# Patient Record
Sex: FEMALE | Race: NATIVE HAWAIIAN OR OTHER PACIFIC ISLANDER | NOT HISPANIC OR LATINO | Employment: FULL TIME | URBAN - METROPOLITAN AREA
[De-identification: names, ages, dates, MRNs, and addresses within clinical notes are randomized per-mention and may not be internally consistent; named-entity substitution may affect disease eponyms.]

---

## 2017-09-28 ENCOUNTER — OFFICE VISIT (OUTPATIENT)
Dept: MEDICAL GROUP | Facility: MEDICAL CENTER | Age: 37
End: 2017-09-28
Payer: COMMERCIAL

## 2017-09-28 VITALS
DIASTOLIC BLOOD PRESSURE: 60 MMHG | HEART RATE: 70 BPM | RESPIRATION RATE: 16 BRPM | BODY MASS INDEX: 23.43 KG/M2 | WEIGHT: 149.3 LBS | OXYGEN SATURATION: 95 % | HEIGHT: 67 IN | TEMPERATURE: 98.9 F | SYSTOLIC BLOOD PRESSURE: 108 MMHG

## 2017-09-28 DIAGNOSIS — F41.1 GAD (GENERALIZED ANXIETY DISORDER): ICD-10-CM

## 2017-09-28 DIAGNOSIS — D51.0 PERNICIOUS ANEMIA: ICD-10-CM

## 2017-09-28 DIAGNOSIS — Z97.5 INTRAUTERINE DEVICE: ICD-10-CM

## 2017-09-28 DIAGNOSIS — J30.1 SEASONAL ALLERGIC RHINITIS DUE TO POLLEN: ICD-10-CM

## 2017-09-28 DIAGNOSIS — F33.1 MAJOR DEPRESSIVE DISORDER, RECURRENT EPISODE, MODERATE (HCC): ICD-10-CM

## 2017-09-28 PROCEDURE — 99204 OFFICE O/P NEW MOD 45 MIN: CPT | Performed by: INTERNAL MEDICINE

## 2017-09-28 RX ORDER — CYANOCOBALAMIN 1000 UG/ML
1000 INJECTION, SOLUTION INTRAMUSCULAR; SUBCUTANEOUS ONCE
Status: DISCONTINUED | OUTPATIENT
Start: 2017-09-28 | End: 2017-09-28

## 2017-09-28 RX ORDER — PAROXETINE 10 MG/1
10 TABLET, FILM COATED ORAL DAILY
Qty: 90 TAB | Refills: 1 | Status: SHIPPED | OUTPATIENT
Start: 2017-09-28 | End: 2017-12-20

## 2017-09-28 RX ORDER — CYANOCOBALAMIN 1000 UG/ML
1000 INJECTION, SOLUTION INTRAMUSCULAR; SUBCUTANEOUS
Qty: 12 VIAL | Refills: 3 | Status: SHIPPED | OUTPATIENT
Start: 2017-09-28 | End: 2017-12-20 | Stop reason: SDUPTHER

## 2017-09-28 ASSESSMENT — PATIENT HEALTH QUESTIONNAIRE - PHQ9
2. FEELING DOWN, DEPRESSED, IRRITABLE, OR HOPELESS: 3
5. POOR APPETITE OR OVEREATING: 1
8. MOVING OR SPEAKING SO SLOWLY THAT OTHER PEOPLE COULD HAVE NOTICED. OR THE OPPOSITE, BEING SO FIGETY OR RESTLESS THAT YOU HAVE BEEN MOVING AROUND A LOT MORE THAN USUAL: 2
CLINICAL INTERPRETATION OF PHQ2 SCORE: 3
5. POOR APPETITE OR OVEREATING: 1 - SEVERAL DAYS
6. FEELING BAD ABOUT YOURSELF - OR THAT YOU ARE A FAILURE OR HAVE LET YOURSELF OR YOUR FAMILY DOWN: 0
9. THOUGHTS THAT YOU WOULD BE BETTER OFF DEAD, OR OF HURTING YOURSELF: 1
7. TROUBLE CONCENTRATING ON THINGS, SUCH AS READING THE NEWSPAPER OR WATCHING TELEVISION: 1
SUM OF ALL RESPONSES TO PHQ QUESTIONS 1-9: 12
SUM OF ALL RESPONSES TO PHQ QUESTIONS 1-9: 15
3. TROUBLE FALLING OR STAYING ASLEEP OR SLEEPING TOO MUCH: 3
SUM OF ALL RESPONSES TO PHQ9 QUESTIONS 1 AND 2: 4
4. FEELING TIRED OR HAVING LITTLE ENERGY: 3
1. LITTLE INTEREST OR PLEASURE IN DOING THINGS: 1
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
## 2017-10-12 ENCOUNTER — TELEPHONE (OUTPATIENT)
Dept: MEDICAL GROUP | Facility: MEDICAL CENTER | Age: 37
End: 2017-10-12

## 2017-10-25 ENCOUNTER — OFFICE VISIT (OUTPATIENT)
Dept: MEDICAL GROUP | Facility: MEDICAL CENTER | Age: 37
End: 2017-10-25
Payer: COMMERCIAL

## 2017-10-25 VITALS
DIASTOLIC BLOOD PRESSURE: 62 MMHG | WEIGHT: 150 LBS | TEMPERATURE: 97.8 F | OXYGEN SATURATION: 96 % | HEIGHT: 66 IN | BODY MASS INDEX: 24.11 KG/M2 | SYSTOLIC BLOOD PRESSURE: 102 MMHG | HEART RATE: 61 BPM

## 2017-10-25 DIAGNOSIS — F41.1 GAD (GENERALIZED ANXIETY DISORDER): ICD-10-CM

## 2017-10-25 DIAGNOSIS — D51.0 PERNICIOUS ANEMIA: ICD-10-CM

## 2017-10-25 DIAGNOSIS — Z00.00 HEALTH CARE MAINTENANCE: ICD-10-CM

## 2017-10-25 DIAGNOSIS — F32.5 MAJOR DEPRESSIVE DISORDER WITH SINGLE EPISODE, IN REMISSION (HCC): ICD-10-CM

## 2017-10-25 PROCEDURE — 99214 OFFICE O/P EST MOD 30 MIN: CPT | Performed by: INTERNAL MEDICINE

## 2017-10-25 ASSESSMENT — PATIENT HEALTH QUESTIONNAIRE - PHQ9
9. THOUGHTS THAT YOU WOULD BE BETTER OFF DEAD, OR OF HURTING YOURSELF: 0
7. TROUBLE CONCENTRATING ON THINGS, SUCH AS READING THE NEWSPAPER OR WATCHING TELEVISION: 1
5. POOR APPETITE OR OVEREATING: 1
2. FEELING DOWN, DEPRESSED, IRRITABLE, OR HOPELESS: 1
4. FEELING TIRED OR HAVING LITTLE ENERGY: 0
3. TROUBLE FALLING OR STAYING ASLEEP OR SLEEPING TOO MUCH: 0
SUM OF ALL RESPONSES TO PHQ QUESTIONS 1-9: 5
SUM OF ALL RESPONSES TO PHQ9 QUESTIONS 1 AND 2: 2
1. LITTLE INTEREST OR PLEASURE IN DOING THINGS: 1
8. MOVING OR SPEAKING SO SLOWLY THAT OTHER PEOPLE COULD HAVE NOTICED. OR THE OPPOSITE, BEING SO FIGETY OR RESTLESS THAT YOU HAVE BEEN MOVING AROUND A LOT MORE THAN USUAL: 1
6. FEELING BAD ABOUT YOURSELF - OR THAT YOU ARE A FAILURE OR HAVE LET YOURSELF OR YOUR FAMILY DOWN: 0
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
## 2017-10-26 LAB
BASOPHILS # BLD AUTO: 0 X10E3/UL (ref 0–0.2)
BASOPHILS NFR BLD AUTO: 0 %
EOSINOPHIL # BLD AUTO: 0.1 X10E3/UL (ref 0–0.4)
EOSINOPHIL NFR BLD AUTO: 1 %
ERYTHROCYTE [DISTWIDTH] IN BLOOD BY AUTOMATED COUNT: 12.9 % (ref 12.3–15.4)
FOLATE SERPL-MCNC: >19.9 NG/ML
HCT VFR BLD AUTO: 41.4 % (ref 34–46.6)
HGB BLD-MCNC: 14.3 G/DL (ref 11.1–15.9)
IMM GRANULOCYTES # BLD: 0 X10E3/UL (ref 0–0.1)
IMM GRANULOCYTES NFR BLD: 0 %
IMMATURE CELLS  115398: ABNORMAL
LYMPHOCYTES # BLD AUTO: 1.5 X10E3/UL (ref 0.7–3.1)
LYMPHOCYTES NFR BLD AUTO: 23 %
MCH RBC QN AUTO: 35.9 PG (ref 26.6–33)
MCHC RBC AUTO-ENTMCNC: 34.5 G/DL (ref 31.5–35.7)
MCV RBC AUTO: 104 FL (ref 79–97)
MONOCYTES # BLD AUTO: 0.6 X10E3/UL (ref 0.1–0.9)
MONOCYTES NFR BLD AUTO: 10 %
MORPHOLOGY BLD-IMP: ABNORMAL
NEUTROPHILS # BLD AUTO: 4.1 X10E3/UL (ref 1.4–7)
NEUTROPHILS NFR BLD AUTO: 66 %
NRBC BLD AUTO-RTO: ABNORMAL %
PLATELET # BLD AUTO: 378 X10E3/UL (ref 150–379)
RBC # BLD AUTO: 3.98 X10E6/UL (ref 3.77–5.28)
VIT B12 SERPL-MCNC: 1563 PG/ML (ref 211–946)
WBC # BLD AUTO: 6.2 X10E3/UL (ref 3.4–10.8)

## 2017-11-29 ENCOUNTER — GYNECOLOGY VISIT (OUTPATIENT)
Dept: OBGYN | Facility: MEDICAL CENTER | Age: 37
End: 2017-11-29
Payer: COMMERCIAL

## 2017-11-29 ENCOUNTER — HOSPITAL ENCOUNTER (OUTPATIENT)
Facility: MEDICAL CENTER | Age: 37
End: 2017-11-29
Attending: OBSTETRICS & GYNECOLOGY
Payer: COMMERCIAL

## 2017-11-29 VITALS
WEIGHT: 153 LBS | HEIGHT: 66 IN | SYSTOLIC BLOOD PRESSURE: 108 MMHG | BODY MASS INDEX: 24.59 KG/M2 | DIASTOLIC BLOOD PRESSURE: 68 MMHG

## 2017-11-29 DIAGNOSIS — Z11.51 SPECIAL SCREENING EXAMINATION FOR HUMAN PAPILLOMAVIRUS (HPV): ICD-10-CM

## 2017-11-29 DIAGNOSIS — Z12.4 CERVICAL CANCER SCREENING: ICD-10-CM

## 2017-11-29 DIAGNOSIS — Z01.419 WELL WOMAN EXAM: ICD-10-CM

## 2017-11-29 DIAGNOSIS — Z97.5 IUD (INTRAUTERINE DEVICE) IN PLACE: ICD-10-CM

## 2017-11-29 PROCEDURE — 99395 PREV VISIT EST AGE 18-39: CPT | Performed by: OBSTETRICS & GYNECOLOGY

## 2017-11-29 PROCEDURE — 88175 CYTOPATH C/V AUTO FLUID REDO: CPT

## 2017-11-29 PROCEDURE — 87624 HPV HI-RISK TYP POOLED RSLT: CPT

## 2017-11-29 NOTE — PROGRESS NOTES
"Tiarra Chavez is a 37 y.o. y.o. female who presents for her Gynecologic Exam        HPI Comments: Pt presents for well woman exam. Pt has complaints of her left breast becoming larger since IUD placed and desires to remove in the next few weeks. She had the Mirena IUD for bleeding and has done well with it. Will be  in 6 months. Patient's last menstrual period was 2011.  .  Review of Systems   Pertinent positives documented in HPI and all other systems reviewed & are negative    All PMH, PSH, allergies, social history and FH reviewed and updated today:  Past Medical History:   Diagnosis Date   • Allergic rhinitis 2011   • Headache(784.0)     When in school, not a problem now.  With sleep deprivation.   • Pernicious anemia      Past Surgical History:   Procedure Laterality Date   • VAGINAL PERINEAL EXAM REPAIR  2012    Performed by HUSSAIN RANGEL at [facility]   • LUMPECTOMY      \"[facility]\"     Shellfish allergy  Social History     Social History   • Marital status: Single     Spouse name: N/A   • Number of children: N/A   • Years of education: N/A     Social History Main Topics   • Smoking status: Never Smoker   • Smokeless tobacco: Never Used   • Alcohol use No   • Drug use: No   • Sexual activity: Not Currently     Partners: Male     Birth control/ protection: Condom, Pill     Other Topics Concern   • Not on file     Social History Narrative   • No narrative on file     Family History   Problem Relation Age of Onset   • Hypertension Mother    • Thyroid Mother      Hypothyroid   • Hyperlipidemia Father    • Cancer Father      T cell lymphoma   • Heart Disease Maternal Grandfather      MI 40 yo, unknown RF.   • Cancer Paternal Uncle      lymphoma     Medications:   Current Outpatient Prescriptions Ordered in [facility]   Medication Sig Dispense Refill   • docusate sodium (COLACE) 100 MG CAPS Take 1 Cap by mouth 2 times a day as needed for Constipation. 30 Cap 2   • " "cyanocobalamin (VITAMIN B-12) 1000 MCG/ML Solution 1 mL by Intramuscular route every 7 days. 12 Vial 3   • paroxetine (PAXIL) 10 MG Tab Take 1 Tab by mouth every day. (Patient not taking: Reported on 11/29/2017) 90 Tab 1   • clindamycin (CLEOCIN) 300 MG CAPS Take 1 Cap by mouth 4 times a day. 28 Each 0   • folic acid (FOLVITE) 1 MG TABS Take 1 mg by mouth every day.       No current [facility]-ordered facility-administered medications on file.           Objective:   Vital measurements:  Blood pressure 108/68, height 1.676 m (5' 6\"), weight 69.4 kg (153 lb), last menstrual period 08/01/2011.  Body mass index is 24.69 kg/m². (Goal BM I>18 <25)    Physical Exam   Nursing note and vitals reviewed.  Constitutional: She is oriented to person, place, and time. She appears well-developed and well-nourished. No distress.     HEENT:   Head: Normocephalic and atraumatic.   Right Ear: External ear normal.   Left Ear: External ear normal.   Nose: Nose normal.   Eyes: Conjunctivae and EOM are normal. Pupils are equal, round, and reactive to light. No scleral icterus.     Neck: Normal range of motion. Neck supple. No tracheal deviation present. No thyromegaly present.     Pulmonary/Chest: Effort normal and breath sounds normal. No respiratory distress. She has no wheezes. She has no rales. She exhibits no tenderness.     Cardiovascular: Regular, rate and rhythm. No JVD.    Abdominal: Soft. Bowel sounds are normal. She exhibits no distension and no mass. No tenderness. She has no rebound and no guarding.     Breast:  Symmetrical, normal consistency without masses., No dimpling or skin changes, Normal nipples without discharge, no axillary lymphadenopathy, negative    Genitourinary:  Pelvic exam was performed with patient supine.  External genitalia with no abnormal pigmentation, labial fusion,rash, tenderness, lesion or injury to the labia bilaterally.  Vagina is moist with no lesions, foul discharge, erythema, tenderness or bleeding. No " foreign body around the vagina or signs of injury.   Cervix exhibits no motion tenderness, no discharge and no friability. IUD strings visible  Uterus is RV not deviated, not enlarged, not fixed and not tender.  Right adnexum displays no mass, no tenderness and no fullness. Left adnexum displays no mass, no tenderness and no fullness.     Musculoskeletal: Normal range of motion. She exhibits no edema and no tenderness.     Lymphadenopathy: She has no cervical adenopathy.     Neurological: She is alert and oriented to person, place, and time. She exhibits normal muscle tone.     Skin: Skin is warm and dry. No rash noted. She is not diaphoretic. No erythema. No pallor.     Psychiatric: She has a normal mood and affect. Her behavior is normal. Judgment and thought content normal.               Assessment:     1. Well woman exam  ThinPrep Pap, w/HPV rflx to genotype   2. Cervical cancer screening  ThinPrep Pap, w/HPV rflx to genotype   3. Screen for sexually transmitted diseases  ThinPrep Pap, w/HPV rflx to genotype   4. IUD (intrauterine device) in place  REFERRAL TO OB/GYN         Plan:   Pap and physical exam performed  Monthly SBE.  Counseling: breast self exam, STD prevention, HIV risk factors and prevention and family planning choices  Encourage exercise and proper diet.  Mammograms starting @ age 40 annually.  See medications and orders placed in encounter report.

## 2017-11-30 LAB
CYTOLOGY REG CYTOL: NORMAL
HPV HR 12 DNA CVX QL NAA+PROBE: NEGATIVE
HPV16 DNA SPEC QL NAA+PROBE: NEGATIVE
HPV18 DNA SPEC QL NAA+PROBE: NEGATIVE
SPECIMEN SOURCE: NORMAL

## 2017-12-14 ENCOUNTER — GYNECOLOGY VISIT (OUTPATIENT)
Dept: OBGYN | Facility: MEDICAL CENTER | Age: 37
End: 2017-12-14
Payer: COMMERCIAL

## 2017-12-14 VITALS
WEIGHT: 151 LBS | HEIGHT: 66 IN | BODY MASS INDEX: 24.27 KG/M2 | SYSTOLIC BLOOD PRESSURE: 105 MMHG | DIASTOLIC BLOOD PRESSURE: 70 MMHG

## 2017-12-14 DIAGNOSIS — Z30.432 ENCOUNTER FOR IUD REMOVAL: ICD-10-CM

## 2017-12-14 PROCEDURE — 58301 REMOVE INTRAUTERINE DEVICE: CPT | Performed by: OBSTETRICS & GYNECOLOGY

## 2017-12-15 NOTE — PROGRESS NOTES
IUD removal:    Speculum placed in the vagina and cervix visualized. IUD strings seen. IUD strings grasped with ring forceps and removed intact. Hemostasis noted. Pt tolerated procedure well.

## 2017-12-20 ENCOUNTER — OFFICE VISIT (OUTPATIENT)
Dept: MEDICAL GROUP | Facility: MEDICAL CENTER | Age: 37
End: 2017-12-20
Payer: COMMERCIAL

## 2017-12-20 VITALS
WEIGHT: 151.8 LBS | SYSTOLIC BLOOD PRESSURE: 100 MMHG | OXYGEN SATURATION: 95 % | HEIGHT: 66 IN | BODY MASS INDEX: 24.4 KG/M2 | TEMPERATURE: 98 F | HEART RATE: 76 BPM | RESPIRATION RATE: 16 BRPM | DIASTOLIC BLOOD PRESSURE: 62 MMHG

## 2017-12-20 DIAGNOSIS — D51.0 PERNICIOUS ANEMIA: ICD-10-CM

## 2017-12-20 DIAGNOSIS — F41.1 GAD (GENERALIZED ANXIETY DISORDER): ICD-10-CM

## 2017-12-20 DIAGNOSIS — F32.5 MAJOR DEPRESSIVE DISORDER WITH SINGLE EPISODE, IN REMISSION (HCC): ICD-10-CM

## 2017-12-20 PROBLEM — Z97.5 INTRAUTERINE DEVICE: Status: RESOLVED | Noted: 2017-09-28 | Resolved: 2017-12-20

## 2017-12-20 PROCEDURE — 99214 OFFICE O/P EST MOD 30 MIN: CPT | Performed by: INTERNAL MEDICINE

## 2017-12-20 RX ORDER — CYANOCOBALAMIN 1000 UG/ML
1000 INJECTION, SOLUTION INTRAMUSCULAR; SUBCUTANEOUS
Qty: 12 VIAL | Refills: 5 | Status: SHIPPED | OUTPATIENT
Start: 2017-12-20 | End: 2018-07-03 | Stop reason: SDUPTHER

## 2017-12-20 ASSESSMENT — PATIENT HEALTH QUESTIONNAIRE - PHQ9
5. POOR APPETITE OR OVEREATING: 1
SUM OF ALL RESPONSES TO PHQ9 QUESTIONS 1 AND 2: 1
1. LITTLE INTEREST OR PLEASURE IN DOING THINGS: 1
6. FEELING BAD ABOUT YOURSELF - OR THAT YOU ARE A FAILURE OR HAVE LET YOURSELF OR YOUR FAMILY DOWN: 0
3. TROUBLE FALLING OR STAYING ASLEEP OR SLEEPING TOO MUCH: 0
9. THOUGHTS THAT YOU WOULD BE BETTER OFF DEAD, OR OF HURTING YOURSELF: 0
8. MOVING OR SPEAKING SO SLOWLY THAT OTHER PEOPLE COULD HAVE NOTICED. OR THE OPPOSITE, BEING SO FIGETY OR RESTLESS THAT YOU HAVE BEEN MOVING AROUND A LOT MORE THAN USUAL: 0
SUM OF ALL RESPONSES TO PHQ QUESTIONS 1-9: 3
CLINICAL INTERPRETATION OF PHQ2 SCORE: 0
7. TROUBLE CONCENTRATING ON THINGS, SUCH AS READING THE NEWSPAPER OR WATCHING TELEVISION: 1
4. FEELING TIRED OR HAVING LITTLE ENERGY: 0
2. FEELING DOWN, DEPRESSED, IRRITABLE, OR HOPELESS: 0
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
## 2018-01-04 LAB
BASOPHILS # BLD AUTO: 0 X10E3/UL (ref 0–0.2)
BASOPHILS NFR BLD AUTO: 0 %
EOSINOPHIL # BLD AUTO: 0.1 X10E3/UL (ref 0–0.4)
EOSINOPHIL NFR BLD AUTO: 1 %
ERYTHROCYTE [DISTWIDTH] IN BLOOD BY AUTOMATED COUNT: 12.3 % (ref 12.3–15.4)
FOLATE SERPL-MCNC: >19.9 NG/ML
HCT VFR BLD AUTO: 43.5 % (ref 34–46.6)
HGB BLD-MCNC: 14.8 G/DL (ref 11.1–15.9)
IMM GRANULOCYTES # BLD: 0 X10E3/UL (ref 0–0.1)
IMM GRANULOCYTES NFR BLD: 0 %
IMMATURE CELLS  115398: ABNORMAL
LYMPHOCYTES # BLD AUTO: 1.3 X10E3/UL (ref 0.7–3.1)
LYMPHOCYTES NFR BLD AUTO: 25 %
MCH RBC QN AUTO: 35.7 PG (ref 26.6–33)
MCHC RBC AUTO-ENTMCNC: 34 G/DL (ref 31.5–35.7)
MCV RBC AUTO: 105 FL (ref 79–97)
MONOCYTES # BLD AUTO: 0.4 X10E3/UL (ref 0.1–0.9)
MONOCYTES NFR BLD AUTO: 7 %
MORPHOLOGY BLD-IMP: ABNORMAL
NEUTROPHILS # BLD AUTO: 3.4 X10E3/UL (ref 1.4–7)
NEUTROPHILS NFR BLD AUTO: 67 %
NRBC BLD AUTO-RTO: ABNORMAL %
PLATELET # BLD AUTO: 376 X10E3/UL (ref 150–379)
RBC # BLD AUTO: 4.15 X10E6/UL (ref 3.77–5.28)
WBC # BLD AUTO: 5.1 X10E3/UL (ref 3.4–10.8)

## 2018-07-02 ENCOUNTER — TELEPHONE (OUTPATIENT)
Dept: OBGYN | Facility: CLINIC | Age: 38
End: 2018-07-02

## 2018-07-02 NOTE — TELEPHONE ENCOUNTER
----- Message from Hallie Rod sent at 6/28/2018  1:47 PM PDT -----  Contact: [phone]  Pt has uti - would like rx called

## 2018-07-03 ENCOUNTER — OFFICE VISIT (OUTPATIENT)
Dept: MEDICAL GROUP | Facility: MEDICAL CENTER | Age: 38
End: 2018-07-03
Payer: COMMERCIAL

## 2018-07-03 VITALS
WEIGHT: 146.39 LBS | DIASTOLIC BLOOD PRESSURE: 78 MMHG | TEMPERATURE: 98.1 F | HEIGHT: 66 IN | BODY MASS INDEX: 23.53 KG/M2 | SYSTOLIC BLOOD PRESSURE: 102 MMHG | HEART RATE: 70 BPM | OXYGEN SATURATION: 94 %

## 2018-07-03 DIAGNOSIS — F41.1 GAD (GENERALIZED ANXIETY DISORDER): ICD-10-CM

## 2018-07-03 DIAGNOSIS — Z00.00 HEALTH CARE MAINTENANCE: ICD-10-CM

## 2018-07-03 DIAGNOSIS — F32.5 MAJOR DEPRESSIVE DISORDER WITH SINGLE EPISODE, IN REMISSION (HCC): ICD-10-CM

## 2018-07-03 DIAGNOSIS — R35.0 URINARY FREQUENCY: ICD-10-CM

## 2018-07-03 DIAGNOSIS — N62 MACROMASTIA: ICD-10-CM

## 2018-07-03 DIAGNOSIS — D51.0 PERNICIOUS ANEMIA: ICD-10-CM

## 2018-07-03 DIAGNOSIS — R53.83 FATIGUE, UNSPECIFIED TYPE: ICD-10-CM

## 2018-07-03 PROCEDURE — 99214 OFFICE O/P EST MOD 30 MIN: CPT | Performed by: INTERNAL MEDICINE

## 2018-07-03 RX ORDER — CYANOCOBALAMIN 1000 UG/ML
1000 INJECTION, SOLUTION INTRAMUSCULAR; SUBCUTANEOUS
Qty: 12 VIAL | Refills: 1 | Status: SHIPPED | OUTPATIENT
Start: 2018-07-03 | End: 2018-12-01

## 2018-07-03 ASSESSMENT — PATIENT HEALTH QUESTIONNAIRE - PHQ9
3. TROUBLE FALLING OR STAYING ASLEEP OR SLEEPING TOO MUCH: 1
5. POOR APPETITE OR OVEREATING: 1
7. TROUBLE CONCENTRATING ON THINGS, SUCH AS READING THE NEWSPAPER OR WATCHING TELEVISION: 0
SUM OF ALL RESPONSES TO PHQ9 QUESTIONS 1 AND 2: 0
4. FEELING TIRED OR HAVING LITTLE ENERGY: 1
2. FEELING DOWN, DEPRESSED, IRRITABLE, OR HOPELESS: 0
SUM OF ALL RESPONSES TO PHQ QUESTIONS 1-9: 3
6. FEELING BAD ABOUT YOURSELF - OR THAT YOU ARE A FAILURE OR HAVE LET YOURSELF OR YOUR FAMILY DOWN: 0
1. LITTLE INTEREST OR PLEASURE IN DOING THINGS: 0
8. MOVING OR SPEAKING SO SLOWLY THAT OTHER PEOPLE COULD HAVE NOTICED. OR THE OPPOSITE, BEING SO FIGETY OR RESTLESS THAT YOU HAVE BEEN MOVING AROUND A LOT MORE THAN USUAL: 0
9. THOUGHTS THAT YOU WOULD BE BETTER OFF DEAD, OR OF HURTING YOURSELF: 0
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
## 2018-07-09 DIAGNOSIS — D51.0 PERNICIOUS ANEMIA: ICD-10-CM

## 2018-07-09 LAB
25(OH)D3+25(OH)D2 SERPL-MCNC: 31.4 NG/ML (ref 30–100)
ALBUMIN SERPL-MCNC: 4.4 G/DL (ref 3.5–5.5)
ALBUMIN/GLOB SERPL: 1.4 {RATIO} (ref 1.2–2.2)
ALP SERPL-CCNC: 64 IU/L (ref 39–117)
ALT SERPL-CCNC: 10 IU/L (ref 0–32)
APPEARANCE UR: CLEAR
AST SERPL-CCNC: 16 IU/L (ref 0–40)
BACTERIA #/AREA URNS HPF: ABNORMAL /[HPF]
BACTERIA UR CULT: NORMAL
BACTERIA UR CULT: NORMAL
BASOPHILS # BLD AUTO: 0 X10E3/UL (ref 0–0.2)
BASOPHILS NFR BLD AUTO: 0 %
BILIRUB SERPL-MCNC: 0.7 MG/DL (ref 0–1.2)
BILIRUB UR QL STRIP: NEGATIVE
BUN SERPL-MCNC: 14 MG/DL (ref 6–20)
BUN/CREAT SERPL: 18 (ref 9–23)
CALCIUM SERPL-MCNC: 9.7 MG/DL (ref 8.7–10.2)
CASTS URNS MICRO: ABNORMAL
CASTS URNS QL MICRO: ABNORMAL
CHLORIDE SERPL-SCNC: 101 MMOL/L (ref 96–106)
CHOLEST SERPL-MCNC: 166 MG/DL (ref 100–199)
CO2 SERPL-SCNC: 23 MMOL/L (ref 20–29)
COLOR UR: YELLOW
CREAT SERPL-MCNC: 0.77 MG/DL (ref 0.57–1)
CRYSTALS URNS MICRO: ABNORMAL
EOSINOPHIL # BLD AUTO: 0.1 X10E3/UL (ref 0–0.4)
EOSINOPHIL NFR BLD AUTO: 2 %
EPI CELLS #/AREA URNS HPF: ABNORMAL /HPF
ERYTHROCYTE [DISTWIDTH] IN BLOOD BY AUTOMATED COUNT: 12.9 % (ref 12.3–15.4)
GLOBULIN SER CALC-MCNC: 3.1 G/DL (ref 1.5–4.5)
GLUCOSE SERPL-MCNC: 87 MG/DL (ref 65–99)
GLUCOSE UR QL: NEGATIVE
HCT VFR BLD AUTO: 44.2 % (ref 34–46.6)
HDLC SERPL-MCNC: 59 MG/DL
HGB BLD-MCNC: 14.4 G/DL (ref 11.1–15.9)
HGB UR QL STRIP: NEGATIVE
IF AFRICAN AMERICAN  100797: 112 ML/MIN/1.73
IF NON AFRICAN AMER 100791: 98 ML/MIN/1.73
IMM GRANULOCYTES # BLD: 0 X10E3/UL (ref 0–0.1)
IMM GRANULOCYTES NFR BLD: 0 %
IMMATURE CELLS  115398: ABNORMAL
KETONES UR QL STRIP: NEGATIVE
LABORATORY COMMENT REPORT: NORMAL
LDLC SERPL CALC-MCNC: 87 MG/DL (ref 0–99)
LEUKOCYTE ESTERASE UR QL STRIP: ABNORMAL
LYMPHOCYTES # BLD AUTO: 1.3 X10E3/UL (ref 0.7–3.1)
LYMPHOCYTES NFR BLD AUTO: 19 %
MCH RBC QN AUTO: 34.4 PG (ref 26.6–33)
MCHC RBC AUTO-ENTMCNC: 32.6 G/DL (ref 31.5–35.7)
MCV RBC AUTO: 106 FL (ref 79–97)
MICRO URNS: ABNORMAL
MICRO URNS: ABNORMAL
MONOCYTES # BLD AUTO: 0.7 X10E3/UL (ref 0.1–0.9)
MONOCYTES NFR BLD AUTO: 10 %
MORPHOLOGY BLD-IMP: ABNORMAL
MUCOUS THREADS URNS QL MICRO: PRESENT
NEUTROPHILS # BLD AUTO: 4.8 X10E3/UL (ref 1.4–7)
NEUTROPHILS NFR BLD AUTO: 69 %
NITRITE UR QL STRIP: NEGATIVE
NRBC BLD AUTO-RTO: ABNORMAL %
PH UR STRIP: 7 [PH] (ref 5–7.5)
PLATELET # BLD AUTO: 403 X10E3/UL (ref 150–379)
POTASSIUM SERPL-SCNC: 4 MMOL/L (ref 3.5–5.2)
PROT SERPL-MCNC: 7.5 G/DL (ref 6–8.5)
PROT UR QL STRIP: ABNORMAL
RBC # BLD AUTO: 4.19 X10E6/UL (ref 3.77–5.28)
RBC #/AREA URNS HPF: ABNORMAL /HPF
RENAL EPI CELLS #/AREA URNS HPF: ABNORMAL /[HPF]
SODIUM SERPL-SCNC: 139 MMOL/L (ref 134–144)
SP GR UR: 1.03 (ref 1–1.03)
T VAGINALIS URNS QL MICRO: ABNORMAL
TRIGL SERPL-MCNC: 98 MG/DL (ref 0–149)
TSH SERPL DL<=0.005 MIU/L-ACNC: 1.35 UIU/ML (ref 0.45–4.5)
UNIDENT CRYS URNS QL MICRO: ABNORMAL
URINALYSIS REFLEX  377202: ABNORMAL
URNS CMNT MICRO: ABNORMAL
UROBILINOGEN UR STRIP-MCNC: 0.2 MG/DL (ref 0.2–1)
VLDLC SERPL CALC-MCNC: 20 MG/DL (ref 5–40)
WBC # BLD AUTO: 6.9 X10E3/UL (ref 3.4–10.8)
WBC #/AREA URNS HPF: >30 /HPF
YEAST #/AREA URNS HPF: ABNORMAL /[HPF]

## 2019-01-22 ENCOUNTER — OFFICE VISIT (OUTPATIENT)
Dept: MEDICAL GROUP | Facility: MEDICAL CENTER | Age: 39
End: 2019-01-22
Payer: COMMERCIAL

## 2019-01-22 VITALS
BODY MASS INDEX: 24.23 KG/M2 | HEART RATE: 76 BPM | WEIGHT: 150.79 LBS | TEMPERATURE: 97.7 F | DIASTOLIC BLOOD PRESSURE: 78 MMHG | SYSTOLIC BLOOD PRESSURE: 122 MMHG | OXYGEN SATURATION: 99 % | HEIGHT: 66 IN | RESPIRATION RATE: 14 BRPM

## 2019-01-22 DIAGNOSIS — Z00.00 HEALTH CARE MAINTENANCE: ICD-10-CM

## 2019-01-22 DIAGNOSIS — D51.0 PERNICIOUS ANEMIA: ICD-10-CM

## 2019-01-22 PROBLEM — F32.5 MAJOR DEPRESSIVE DISORDER WITH SINGLE EPISODE, IN REMISSION (HCC): Status: RESOLVED | Noted: 2017-10-25 | Resolved: 2019-01-22

## 2019-01-22 PROBLEM — F41.1 GAD (GENERALIZED ANXIETY DISORDER): Status: RESOLVED | Noted: 2017-10-25 | Resolved: 2019-01-22

## 2019-01-22 PROCEDURE — 99214 OFFICE O/P EST MOD 30 MIN: CPT | Performed by: INTERNAL MEDICINE

## 2019-01-22 RX ORDER — CYANOCOBALAMIN 1000 UG/ML
1000 INJECTION, SOLUTION INTRAMUSCULAR; SUBCUTANEOUS
Qty: 12 VIAL | Refills: 0 | Status: SHIPPED | OUTPATIENT
Start: 2019-01-22 | End: 2019-04-15

## 2019-01-22 NOTE — PROGRESS NOTES
CHIEF COMPLAINT  Chief Complaint   Patient presents with   • Follow-Up     6 mo    Anemia    HPI  Patient is a 38 y.o. female patient who presents today for the following     Pernicious anemia  Unknown control:  · No CBC since 7/2018 x 6 mos  · No Vit B12 level since 10/13/17, x 15 mos  · Came for B12 refill    Dg: in 2015  Denies:  - Anorexia, sweating, pallor  - Extremity numbness or tingling  Rx: B12 injections Q 7 days                      FH: neg  Reviewed remote labs.    Reviewed PMH, PSH, FH, SH, ALL, HCM/IMM, no changes  Reviewed MEDS, no changes    Patient Active Problem List    Diagnosis Date Noted   • Pernicious anemia 12/20/2017   • Health care maintenance 10/25/2017     CURRENT MEDICATIONS  Current Outpatient Prescriptions   Medication Sig Dispense Refill   • cyanocobalamin (VITAMIN B-12) 1000 MCG/ML Solution 1 mL by Intramuscular route every 7 days for 83 days. 12 Vial 0   • Cyanocobalamin (VITAMIN B 12 PO) Take  by mouth.     • folic acid (FOLVITE) 1 MG TABS Take 1 mg by mouth every day.     • docusate sodium (COLACE) 100 MG CAPS Take 1 Cap by mouth 2 times a day as needed for Constipation. 30 Cap 2     No current facility-administered medications for this visit.      ALLERGIES  Allergies: Shellfish allergy  PAST MEDICAL HISTORY  Past Medical History:   Diagnosis Date   • Allergic rhinitis 4/7/2011   • Headache(784.0)     When in school, not a problem now.  With sleep deprivation.   • Pernicious anemia      SURGICAL HISTORY  She  has a past surgical history that includes lumpectomy (2001) and vaginal perineal exam repair (5/14/2012).  SOCIAL HISTORY  Social History   Substance Use Topics   • Smoking status: Never Smoker   • Smokeless tobacco: Never Used   • Alcohol use No     Social History     Social History Narrative   • No narrative on file     FAMILY HISTORY  Family History   Problem Relation Age of Onset   • Hypertension Mother    • Thyroid Mother         Hypothyroid   • Hyperlipidemia Father   "  • Cancer Father         T cell lymphoma   • Heart Disease Maternal Grandfather         MI 40 yo, unknown RF.   • Cancer Paternal Uncle         lymphoma     Family Status   Relation Status   • Mo Alive        59yo   • Fa    • MGFa (Not Specified)   • PUnc (Not Specified)       ROS   Constitutional: Negative for fever, chills.  HENT: Negative for congestion, sore throat.  Eyes: Negative for blurred vision.   Respiratory: Negative for cough, shortness of breath.  Cardiovascular: Negative for chest pain, palpitations.   Gastrointestinal: Negative for heartburn, nausea, abdominal pain.   Genitourinary: Negative for dysuria.  Musculoskeletal: Negative for significant myalgias, back pain and joint pain.   Skin: Negative for rash and itching.   Neuro: Negative for dizziness, weakness and headaches.   Endo/Heme/Allergies: Does not bruise/bleed easily.   Psychiatric/Behavioral: Negative for depression, anxiety    PHYSICAL EXAM   Blood pressure 122/78, pulse 76, temperature 36.5 °C (97.7 °F), temperature source Temporal, resp. rate 14, height 1.676 m (5' 5.98\"), weight 68.4 kg (150 lb 12.7 oz), last menstrual period 2019, SpO2 99 %, not currently breastfeeding. Body mass index is 24.35 kg/m².  General:  NAD, well appearing  HEENT:   NC/AT, PERRLA, EOMI, TMs are clear. Oropharyngeal mucosa is pink,  without lesions;  no cervical / supraclavicular  lymphadenopathy, no thyromegaly.    Cardiovascular: RRR.   No m/r/g. No carotid bruits .      Lungs:   CTAB, no w/r/r, no respiratory distress.  Abdomen: Soft, NT/ND + BS; no suprapubic tenderness; no masses or hepatosplenomegaly.  Extremities:  2+ DP and radial pulses bilaterally.  No c/c/e.   Skin:  Warm, dry.  No erythema. No rash.   Neurologic: Alert & oriented x 3. CN II-XII grossly intact. Brachioradialis / knee DTR are 2/4, symmetric. Strength and sensation grossly intact.  No focal deficits.  Psychiatric:  Affect normal, mood normal, judgment normal.    LABS "     Labs are reviewed and discussed with a patient  Lab Results   Component Value Date/Time    CHOLSTRLTOT 166 07/03/2018 07:53 AM    CHOLSTRLTOT 145 03/16/2010 08:20 AM    LDL 87 07/03/2018 07:53 AM    LDL 71 03/16/2010 08:20 AM    HDL 59 07/03/2018 07:53 AM    HDL 59 03/16/2010 08:20 AM    TRIGLYCERIDE 98 07/03/2018 07:53 AM    TRIGLYCERIDE 76 03/16/2010 08:20 AM       Lab Results   Component Value Date/Time    SODIUM 139 07/03/2018 07:53 AM    SODIUM 131 (L) 05/20/2012 03:00 AM    POTASSIUM 4.0 07/03/2018 07:53 AM    POTASSIUM 3.7 05/20/2012 03:00 AM    CHLORIDE 101 07/03/2018 07:53 AM    CHLORIDE 100 05/20/2012 03:00 AM    CO2 23 07/03/2018 07:53 AM    CO2 21 05/20/2012 03:00 AM    GLUCOSE 87 07/03/2018 07:53 AM    GLUCOSE 109 (H) 05/20/2012 03:00 AM    BUN 14 07/03/2018 07:53 AM    BUN 7 (L) 05/20/2012 03:00 AM    CREATININE 0.77 07/03/2018 07:53 AM    CREATININE 0.67 05/20/2012 03:00 AM    CREATININE 0.80 03/16/2010 08:20 AM    BUNCREATRAT 18 07/03/2018 07:53 AM    BUNCREATRAT 18 03/16/2010 08:20 AM    GLOMRATE >59 03/16/2010 08:20 AM     Lab Results   Component Value Date/Time    ALKPHOSPHAT 64 07/03/2018 07:53 AM    ALKPHOSPHAT 86 05/20/2012 03:00 AM    ASTSGOT 16 07/03/2018 07:53 AM    ASTSGOT 27 05/20/2012 03:00 AM    ALTSGPT 10 07/03/2018 07:53 AM    ALTSGPT 39 05/20/2012 03:00 AM    TBILIRUBIN 0.7 07/03/2018 07:53 AM    TBILIRUBIN 0.9 05/20/2012 03:00 AM      No results found for: HBA1C  Lab Results   Component Value Date/Time    TSH 1.350 07/03/2018 07:53 AM    TSH 1.540 03/16/2010 08:20 AM     No results found for: FREET4    Lab Results   Component Value Date/Time    WBC 6.9 07/03/2018 07:53 AM    WBC 6.5 06/15/2016 05:23 PM    WBC 4.4 03/16/2010 08:20 AM    RBC 4.19 07/03/2018 07:53 AM    RBC 4.15 (L) 06/15/2016 05:23 PM    RBC 4.26 03/16/2010 08:20 AM    HEMOGLOBIN 14.4 07/03/2018 07:53 AM    HEMOGLOBIN 14.5 06/15/2016 05:23 PM    HEMATOCRIT 44.2 07/03/2018 07:53 AM    HEMATOCRIT 43.3 06/15/2016  05:23 PM     (H) 07/03/2018 07:53 AM    .3 (H) 06/15/2016 05:23 PM     (H) 03/16/2010 08:20 AM    MCH 34.4 (H) 07/03/2018 07:53 AM    MCH 34.9 (H) 06/15/2016 05:23 PM    MCH 35.0 (H) 03/16/2010 08:20 AM    MCHC 32.6 07/03/2018 07:53 AM    MCHC 33.5 (L) 06/15/2016 05:23 PM    MPV 9.4 06/15/2016 05:23 PM    NEUTSPOLYS 69 07/03/2018 07:53 AM    NEUTSPOLYS 69.60 06/15/2016 05:23 PM    LYMPHOCYTES 19 07/03/2018 07:53 AM    LYMPHOCYTES 20.40 (L) 06/15/2016 05:23 PM    MONOCYTES 10 07/03/2018 07:53 AM    MONOCYTES 7.70 06/15/2016 05:23 PM    EOSINOPHILS 2 07/03/2018 07:53 AM    EOSINOPHILS 1.20 06/15/2016 05:23 PM    BASOPHILS 0 07/03/2018 07:53 AM    BASOPHILS 0.60 06/15/2016 05:23 PM       Ref. Range 10/13/2017  12/9/2017   Vitamin B12  211 - 946 pg/mL 1,563 (H)    Folate -Folic Acid  >3.0 ng/mL >19.9 >19.9     IMAGING     None    ASSESMENT AND PLAN        1. Pernicious anemia  Pending labs, continue current treatment.    - CBC WITH DIFFERENTIAL; Future  - VIT B12,  FOLIC ACID  - cyanocobalamin (VITAMIN B-12) 1000 MCG/ML Solution; 1 mL by Intramuscular route every 7 days for 83 days.  Dispense: 12 Vial; Refill: 0    2. Health care maintenance  Advised TDAP    Counseling:   - Smoking:  Nonsmoker    Followup: Return in about 3 months (around 4/22/2019), or if symptoms worsen or fail to improve.    All questions are answered.    Please note that this dictation was created using voice recognition software, and that there might be errors of lio and possibly content.

## 2019-03-28 LAB
BASOPHILS # BLD AUTO: 0 X10E3/UL (ref 0–0.2)
BASOPHILS NFR BLD AUTO: 0 %
EOSINOPHIL # BLD AUTO: 0.1 X10E3/UL (ref 0–0.4)
EOSINOPHIL NFR BLD AUTO: 2 %
ERYTHROCYTE [DISTWIDTH] IN BLOOD BY AUTOMATED COUNT: 12.3 % (ref 12.3–15.4)
FOLATE SERPL-MCNC: >19.9 NG/ML
HCT VFR BLD AUTO: 42.2 % (ref 34–46.6)
HGB BLD-MCNC: 14.1 G/DL (ref 11.1–15.9)
IMM GRANULOCYTES # BLD AUTO: 0 X10E3/UL (ref 0–0.1)
IMM GRANULOCYTES NFR BLD AUTO: 0 %
IMMATURE CELLS  115398: ABNORMAL
LYMPHOCYTES # BLD AUTO: 1.3 X10E3/UL (ref 0.7–3.1)
LYMPHOCYTES NFR BLD AUTO: 24 %
MCH RBC QN AUTO: 34.5 PG (ref 26.6–33)
MCHC RBC AUTO-ENTMCNC: 33.4 G/DL (ref 31.5–35.7)
MCV RBC AUTO: 103 FL (ref 79–97)
MONOCYTES # BLD AUTO: 0.4 X10E3/UL (ref 0.1–0.9)
MONOCYTES NFR BLD AUTO: 8 %
MORPHOLOGY BLD-IMP: ABNORMAL
NEUTROPHILS # BLD AUTO: 3.7 X10E3/UL (ref 1.4–7)
NEUTROPHILS NFR BLD AUTO: 66 %
NRBC BLD AUTO-RTO: ABNORMAL %
PLATELET # BLD AUTO: 419 X10E3/UL (ref 150–379)
RBC # BLD AUTO: 4.09 X10E6/UL (ref 3.77–5.28)
VIT B12 SERPL-MCNC: 1436 PG/ML (ref 232–1245)
WBC # BLD AUTO: 5.5 X10E3/UL (ref 3.4–10.8)

## 2019-04-26 ENCOUNTER — OFFICE VISIT (OUTPATIENT)
Dept: MEDICAL GROUP | Facility: MEDICAL CENTER | Age: 39
End: 2019-04-26
Payer: COMMERCIAL

## 2019-04-26 VITALS
SYSTOLIC BLOOD PRESSURE: 110 MMHG | HEART RATE: 64 BPM | RESPIRATION RATE: 14 BRPM | HEIGHT: 66 IN | BODY MASS INDEX: 23.78 KG/M2 | OXYGEN SATURATION: 99 % | WEIGHT: 148 LBS | DIASTOLIC BLOOD PRESSURE: 70 MMHG | TEMPERATURE: 97.5 F

## 2019-04-26 DIAGNOSIS — Z00.00 HEALTH CARE MAINTENANCE: ICD-10-CM

## 2019-04-26 DIAGNOSIS — G89.29 CHRONIC THORACIC BACK PAIN, UNSPECIFIED BACK PAIN LATERALITY: ICD-10-CM

## 2019-04-26 DIAGNOSIS — D51.0 PERNICIOUS ANEMIA: ICD-10-CM

## 2019-04-26 DIAGNOSIS — R53.83 FATIGUE, UNSPECIFIED TYPE: ICD-10-CM

## 2019-04-26 DIAGNOSIS — D75.89 MACROCYTOSIS: ICD-10-CM

## 2019-04-26 DIAGNOSIS — N62 MACROMASTIA: ICD-10-CM

## 2019-04-26 DIAGNOSIS — M54.6 CHRONIC THORACIC BACK PAIN, UNSPECIFIED BACK PAIN LATERALITY: ICD-10-CM

## 2019-04-26 PROCEDURE — 99214 OFFICE O/P EST MOD 30 MIN: CPT | Performed by: INTERNAL MEDICINE

## 2019-04-26 RX ORDER — CYANOCOBALAMIN 1000 UG/ML
INJECTION, SOLUTION INTRAMUSCULAR; SUBCUTANEOUS
Qty: 12 ML | Refills: 1 | Status: SHIPPED | OUTPATIENT
Start: 2019-04-26 | End: 2019-10-30 | Stop reason: SDUPTHER

## 2019-04-26 ASSESSMENT — PATIENT HEALTH QUESTIONNAIRE - PHQ9: CLINICAL INTERPRETATION OF PHQ2 SCORE: 0

## 2019-04-26 NOTE — PROGRESS NOTES
CHIEF COMPLAINT  Pernicious anemia    HPI  Patient is a 38 y.o. female patient who presents today for the following     Pernicious anemia, microcytosis, fatigue  Dg: in 2015  C/o fatigue.  ID is coming    Denies:  - Anorexia, sweating, pallor  - Extremity numbness or tingling  Rx: B12 injections Q 7 days                      FH: neg  Reviewed labs.       Macromastia, back pain  Complains of large breasts, especially on the left side, accompanied with subsequent thoracic back pain.     No change of the breast skin, asymmetry, nipple discharge.   No axillary, cervical LAD.   No fatigue, fevers, night sweats, weight loss.   She is doing self breast exam, no lump.    Reviewed PMH, PSH, FH, SH, ALL, HCM/IMM, no changes  Reviewed MEDS, no changes    Patient Active Problem List    Diagnosis Date Noted   • Pernicious anemia 12/20/2017   • Health care maintenance 10/25/2017     CURRENT MEDICATIONS  Current Outpatient Prescriptions   Medication Sig Dispense Refill   • cyanocobalamin (VITAMIN B-12) 1000 MCG/ML Solution INSERT 1 ML BY INTRAMUSCULAR ROUTE EVERY 7 DAYS 12 mL 1   • Cyanocobalamin (VITAMIN B 12 PO) Take  by mouth.     • folic acid (FOLVITE) 1 MG TABS Take 1 mg by mouth every day.     • docusate sodium (COLACE) 100 MG CAPS Take 1 Cap by mouth 2 times a day as needed for Constipation. 30 Cap 2     No current facility-administered medications for this visit.      ALLERGIES  Allergies: Shellfish allergy  PAST MEDICAL HISTORY  Past Medical History:   Diagnosis Date   • Allergic rhinitis 4/7/2011   • Headache(784.0)     When in school, not a problem now.  With sleep deprivation.   • Pernicious anemia      SURGICAL HISTORY  There is to be more old panel revealed no sugar  has a past surgical history that includes lumpectomy (2001) and vaginal perineal exam repair (5/14/2012).  SOCIAL HISTORY  Social History   Substance Use Topics   • Smoking status: Never Smoker   • Smokeless tobacco: Never Used   • Alcohol use No  "    Social History     Social History Narrative   • No narrative on file     FAMILY HISTORY  Family History   Problem Relation Age of Onset   • Hypertension Mother    • Thyroid Mother         Hypothyroid   • Hyperlipidemia Father    • Cancer Father         T cell lymphoma   • Heart Disease Maternal Grandfather         MI 40 yo, unknown RF.   • Cancer Paternal Uncle         lymphoma     Family Status   Relation Status   • Mo Alive        57yo   • Fa    • MGFa (Not Specified)   • PUnc (Not Specified)     ROS   Constitutional: as above.  HENT: Negative for  sore throat.  Eyes: Negative for blurred vision.   Respiratory: Negative for shortness of breath.  Cardiovascular: Negative for chest pain, palpitations.   Gastrointestinal: Negative for heartburn, abdominal pain.   Musculoskeletal: As above.  Skin: Negative for rash.   Neuro: Negative for dizziness,  headaches.   Endo/Heme/Allergies: Does not bruise/bleed easily.   Psychiatric/Behavioral: Negative for depression.    PHYSICAL EXAM   /70   Pulse 64   Temp 36.4 °C (97.5 °F)   Resp 14   Ht 1.676 m (5' 5.98\")   Wt 67.1 kg (148 lb)   SpO2 99%   BMI 23.90 kg/m²   General:  NAD, well appearing  HEENT:   NC/AT, PERRLA, EOMI, TMs are clear. Oropharyngeal mucosa is pink,  without lesions;  no cervical / supraclavicular  lymphadenopathy, no thyromegaly.    Cardiovascular: RRR.   No m/r/g. No carotid bruits .      Lungs:   CTAB, no w/r/r, no respiratory distress.  Abdomen: Soft, NT/ND + BS; no hepatosplenomegaly.  Extremities:  2+ DP and radial pulses bilaterally.  No c/c/e.   Skin:  Warm, dry.  No erythema. No rash.   Neurologic: Alert & oriented x 3. CN II-XII grossly intact.  No focal deficits.  Psychiatric:  Affect normal, mood normal, judgment normal.    LABS     Labs are reviewed and discussed with a patient  Lab Results   Component Value Date/Time    CHOLSTRLTOT 166 2018 07:53 AM    CHOLSTRLTOT 145 2010 08:20 AM    LDL 87 2018 07:53 " AM    LDL 71 03/16/2010 08:20 AM    HDL 59 07/03/2018 07:53 AM    HDL 59 03/16/2010 08:20 AM    TRIGLYCERIDE 98 07/03/2018 07:53 AM    TRIGLYCERIDE 76 03/16/2010 08:20 AM       Lab Results   Component Value Date/Time    SODIUM 139 07/03/2018 07:53 AM    SODIUM 131 (L) 05/20/2012 03:00 AM    POTASSIUM 4.0 07/03/2018 07:53 AM    POTASSIUM 3.7 05/20/2012 03:00 AM    CHLORIDE 101 07/03/2018 07:53 AM    CHLORIDE 100 05/20/2012 03:00 AM    CO2 23 07/03/2018 07:53 AM    CO2 21 05/20/2012 03:00 AM    GLUCOSE 87 07/03/2018 07:53 AM    GLUCOSE 109 (H) 05/20/2012 03:00 AM    BUN 14 07/03/2018 07:53 AM    BUN 7 (L) 05/20/2012 03:00 AM    CREATININE 0.77 07/03/2018 07:53 AM    CREATININE 0.67 05/20/2012 03:00 AM    CREATININE 0.80 03/16/2010 08:20 AM    BUNCREATRAT 18 07/03/2018 07:53 AM    BUNCREATRAT 18 03/16/2010 08:20 AM    GLOMRATE >59 03/16/2010 08:20 AM     Lab Results   Component Value Date/Time    ALKPHOSPHAT 64 07/03/2018 07:53 AM    ALKPHOSPHAT 86 05/20/2012 03:00 AM    ASTSGOT 16 07/03/2018 07:53 AM    ASTSGOT 27 05/20/2012 03:00 AM    ALTSGPT 10 07/03/2018 07:53 AM    ALTSGPT 39 05/20/2012 03:00 AM    TBILIRUBIN 0.7 07/03/2018 07:53 AM    TBILIRUBIN 0.9 05/20/2012 03:00 AM      No results found for: HBA1C  Lab Results   Component Value Date/Time    TSH 1.350 07/03/2018 07:53 AM    TSH 1.540 03/16/2010 08:20 AM      Ref. Range 3/27/2019    Vitamin B12  232 - 1,245 pg/mL 1,436 (H)     Lab Results   Component Value Date/Time    WBC 5.5 03/27/2019 06:46 AM    WBC 6.5 06/15/2016 05:23 PM    WBC 4.4 03/16/2010 08:20 AM    RBC 4.09 03/27/2019 06:46 AM    RBC 4.15 (L) 06/15/2016 05:23 PM    RBC 4.26 03/16/2010 08:20 AM    HEMOGLOBIN 14.1 03/27/2019 06:46 AM    HEMOGLOBIN 14.5 06/15/2016 05:23 PM    HEMATOCRIT 42.2 03/27/2019 06:46 AM    HEMATOCRIT 43.3 06/15/2016 05:23 PM     (H) 03/27/2019 06:46 AM    .3 (H) 06/15/2016 05:23 PM     (H) 03/16/2010 08:20 AM    MCH 34.5 (H) 03/27/2019 06:46 AM    MCH 34.9  (H) 06/15/2016 05:23 PM    MCH 35.0 (H) 03/16/2010 08:20 AM    MCHC 33.4 03/27/2019 06:46 AM    MCHC 33.5 (L) 06/15/2016 05:23 PM    MPV 9.4 06/15/2016 05:23 PM    NEUTSPOLYS 66 03/27/2019 06:46 AM    NEUTSPOLYS 69.60 06/15/2016 05:23 PM    LYMPHOCYTES 24 03/27/2019 06:46 AM    LYMPHOCYTES 20.40 (L) 06/15/2016 05:23 PM    MONOCYTES 8 03/27/2019 06:46 AM    MONOCYTES 7.70 06/15/2016 05:23 PM    EOSINOPHILS 2 03/27/2019 06:46 AM    EOSINOPHILS 1.20 06/15/2016 05:23 PM    BASOPHILS 0 03/27/2019 06:46 AM    BASOPHILS 0.60 06/15/2016 05:23 PM      IMAGING     None    ASSESMENT AND PLAN        1. Pernicious anemia  Controlled, continue current treatment  - HEMOGLOBIN A1C; Future  - VITAMIN B12; Future  2. Macrocytosis  - VITAMIN B12; Future  3. Fatigue, unspecified type  - VITAMIN D,25 HYDROXY; Future  - TSH; Future    4. Macromastia  Given the referral if she would like to proceed with surgery this year  - REFERRAL TO GENERAL SURGERY  5. Chronic thoracic back pain, unspecified back pain laterality  As above    6. Health care maintenance  Up-to-date    Counseling:   - Smoking:  Nonsmoker    Followup: Return in about 6 months (around 10/26/2019), or if symptoms worsen or fail to improve.    All questions are answered.    Please note that this dictation was created using voice recognition software, and that there might be errors of lio and possibly content.

## 2019-05-01 DIAGNOSIS — N62 MACROMASTIA: ICD-10-CM

## 2019-07-16 ENCOUNTER — PATIENT MESSAGE (OUTPATIENT)
Dept: MEDICAL GROUP | Facility: MEDICAL CENTER | Age: 39
End: 2019-07-16

## 2019-07-17 NOTE — PATIENT COMMUNICATION
1. Name: Tiarra Chavez      Call Back Number: [phone] (home) [phone] (work)     Patient approves a detailed voicemail message: yes    2. Patient is requesting referral to Neurology    3. Clinical Reason for Request: Patient requesting    4. Specialty & Provider Name/Lab/Imaging Location Preference: [facility]    5. Is appointment scheduled for requested order/referral: no    Patient was informed they may receive a return phone call from our office with any additional questions before processing this request.      Not sure if you want me to have Patient come in for an appointment for the referral due to patients Chief complaint        Aly Colin, Med Ass't

## 2019-07-17 NOTE — TELEPHONE ENCOUNTER
From: Tiarra Kaplan [facility]  To: Beatris Pathak M.D.  Sent: 7/16/2019 6:13 PM PDT  Subject: Non-Urgent Medical Question    Dr. Lawson Rose and team--I hope the summer finds you well. I would like a referral to Neurology please to be checked out.    Early in the morning of Monday 7/15/19, my friend said I told him I felt badly, and I slumped down. He said I didn't lose consciousness but my limbs convulsed for about a minute. I don't remember going down at all.    About 3 years ago something similar happened in a grocery store. My other friend was checking out his groceries, I told him I felt badly. He didn't see me go down but he says a lady gasped and I was on the floor. I checked with him Monday and he doesn't remember any convulsing.     I called [facility] yesterday and Neuro said this was the process, to ask for a consult from Primary Care.    Thank you, Tiarra

## 2019-09-27 LAB
25(OH)D3+25(OH)D2 SERPL-MCNC: 23.6 NG/ML (ref 30–100)
BASOPHILS # BLD AUTO: 0 X10E3/UL (ref 0–0.2)
BASOPHILS NFR BLD AUTO: 0 %
EOSINOPHIL # BLD AUTO: 0.1 X10E3/UL (ref 0–0.4)
EOSINOPHIL NFR BLD AUTO: 1 %
ERYTHROCYTE [DISTWIDTH] IN BLOOD BY AUTOMATED COUNT: 12.3 % (ref 12.3–15.4)
HBA1C MFR BLD: 5 % (ref 4.8–5.6)
HCT VFR BLD AUTO: 45.1 % (ref 34–46.6)
HGB BLD-MCNC: 14.8 G/DL (ref 11.1–15.9)
IMM GRANULOCYTES # BLD AUTO: 0 X10E3/UL (ref 0–0.1)
IMM GRANULOCYTES NFR BLD AUTO: 0 %
IMMATURE CELLS  115398: ABNORMAL
LYMPHOCYTES # BLD AUTO: 1.4 X10E3/UL (ref 0.7–3.1)
LYMPHOCYTES NFR BLD AUTO: 20 %
MCH RBC QN AUTO: 33.9 PG (ref 26.6–33)
MCHC RBC AUTO-ENTMCNC: 32.8 G/DL (ref 31.5–35.7)
MCV RBC AUTO: 103 FL (ref 79–97)
MONOCYTES # BLD AUTO: 0.5 X10E3/UL (ref 0.1–0.9)
MONOCYTES NFR BLD AUTO: 8 %
MORPHOLOGY BLD-IMP: ABNORMAL
NEUTROPHILS # BLD AUTO: 5 X10E3/UL (ref 1.4–7)
NEUTROPHILS NFR BLD AUTO: 71 %
NRBC BLD AUTO-RTO: ABNORMAL %
PLATELET # BLD AUTO: 412 X10E3/UL (ref 150–450)
RBC # BLD AUTO: 4.36 X10E6/UL (ref 3.77–5.28)
TSH SERPL DL<=0.005 MIU/L-ACNC: 3.15 UIU/ML (ref 0.45–4.5)
VIT B12 SERPL-MCNC: 1342 PG/ML (ref 232–1245)
WBC # BLD AUTO: 7 X10E3/UL (ref 3.4–10.8)

## 2019-10-30 ENCOUNTER — OFFICE VISIT (OUTPATIENT)
Dept: MEDICAL GROUP | Facility: MEDICAL CENTER | Age: 39
End: 2019-10-30
Payer: COMMERCIAL

## 2019-10-30 VITALS
HEART RATE: 79 BPM | HEIGHT: 66 IN | WEIGHT: 158.73 LBS | DIASTOLIC BLOOD PRESSURE: 60 MMHG | SYSTOLIC BLOOD PRESSURE: 100 MMHG | TEMPERATURE: 97.7 F | BODY MASS INDEX: 25.51 KG/M2 | OXYGEN SATURATION: 96 %

## 2019-10-30 DIAGNOSIS — E55.9 HYPOVITAMINOSIS D: ICD-10-CM

## 2019-10-30 DIAGNOSIS — D51.0 PERNICIOUS ANEMIA: ICD-10-CM

## 2019-10-30 DIAGNOSIS — D75.89 MACROCYTOSIS: ICD-10-CM

## 2019-10-30 DIAGNOSIS — N62 MACROMASTIA: ICD-10-CM

## 2019-10-30 DIAGNOSIS — Z00.00 ANNUAL PHYSICAL EXAM: ICD-10-CM

## 2019-10-30 DIAGNOSIS — Z00.00 HEALTH CARE MAINTENANCE: ICD-10-CM

## 2019-10-30 DIAGNOSIS — R92.8 ABNORMAL MAMMOGRAM: ICD-10-CM

## 2019-10-30 DIAGNOSIS — R92.0 BREAST MICROCALCIFICATIONS: ICD-10-CM

## 2019-10-30 PROCEDURE — 99214 OFFICE O/P EST MOD 30 MIN: CPT | Mod: 25 | Performed by: INTERNAL MEDICINE

## 2019-10-30 PROCEDURE — 99396 PREV VISIT EST AGE 40-64: CPT | Performed by: INTERNAL MEDICINE

## 2019-10-30 RX ORDER — M-VIT,TX,IRON,MINS/CALC/FOLIC 27MG-0.4MG
1 TABLET ORAL DAILY
COMMUNITY
End: 2020-03-24

## 2019-10-30 RX ORDER — CYANOCOBALAMIN 1000 UG/ML
INJECTION, SOLUTION INTRAMUSCULAR; SUBCUTANEOUS
Qty: 12 ML | Refills: 1 | Status: SHIPPED | OUTPATIENT
Start: 2019-10-30 | End: 2020-11-30 | Stop reason: SDUPTHER

## 2019-10-30 RX ORDER — ERGOCALCIFEROL 1.25 MG/1
50000 CAPSULE ORAL
Qty: 12 CAP | Refills: 1 | Status: SHIPPED | OUTPATIENT
Start: 2019-10-30 | End: 2020-10-04

## 2019-10-30 NOTE — PROGRESS NOTES
CHIEF COMPLIANT:  Tiarra Chavez is a 39 y.o. female who presents for annual exam    Recommendations:  Regular exercise at least 4 days a week  Diet: advised balanced diet  Dental exam at least 1-2 times per year  Sunscreen use: advised    Immunizations:  TdaP:  advised  Influenza: 9/2019    GYN  Previous PAP:   normal   Abnormal PAP: no  Last Mammography: 9/19, with  Abnormal mammogram RT/bilateral microcalcification LT  New problem.  - No lump, asymmetry, pain, or LAD  - No anorexia, WT loss.  - She has macromastia.    Menses every month with bleeding for 5 days  No excess cramping or bleeding.   Takes OTC analgesics for cramping  Contraception: none    Pernicious anemia, microcytosis, fatigue  Dg: in 2015     Denies:  - Anorexia, sweating, pallor  - Extremity numbness or tingling  Rx: B12 injections Q 7 days                      FH: neg  Reviewed labs.      Macromastia  Complains of back pain due to large breasts, given referral for plastic surgery.     No change of the breast skin, asymmetry, nipple discharge.   No axillary, cervical LAD.   No fatigue, fevers, night sweats, weight loss.   She is doing self breast exam, no lump.     Hypovitaminosis D  The patient had low vitamin D level.  Vitamin D supplement: no.    CURRENT MEDICATIONS  Current Outpatient Medications   Medication Sig Dispense Refill   • Cyanocobalamin (VITAMIN B 12 PO) Take  by mouth.     • folic acid (FOLVITE) 1 MG TABS Take 1 mg by mouth every day.     • docusate sodium (COLACE) 100 MG CAPS Take 1 Cap by mouth 2 times a day as needed for Constipation. 30 Cap 2     No current facility-administered medications for this visit.      ALLERGIES  Allergies: Shellfish allergy  PAST MEDICAL HISTORY  She  has a past medical history of Allergic rhinitis (4/7/2011), Headache(784.0), and Pernicious anemia.  SURGICAL HISTORY  She  has a past surgical history that includes lumpectomy (2001) and vaginal perineal exam repair (5/14/2012).  SOCIAL  HISTORY  Social History     Tobacco Use   • Smoking status: Never Smoker   • Smokeless tobacco: Never Used   Substance Use Topics   • Alcohol use: No   • Drug use: No     Social History     Social History Narrative   • Not on file     FAMILY HISTORY  Family History   Problem Relation Age of Onset   • Hypertension Mother    • Thyroid Mother         Hypothyroid   • Hyperlipidemia Father    • Cancer Father         T cell lymphoma   • Heart Disease Maternal Grandfather         MI 38 yo, unknown RF.   • Cancer Paternal Uncle         lymphoma     Family Status   Relation Name Status   • Mo Hyperthyroidism Alive        57yo   • Fa     • MGFa  (Not Specified)   • PUnc  (Not Specified)     REVIEW OF SYSTEMS  Constitutional: Denies fever, chills, fatigue.  Skin: negative for rash, scaling, itching, pigmentation.  Eyes: negative for blurred vision, eye pain, discharge from eyes.  ENT: negative for hearing problems, vertigo.  Respiratory: negative for persistent cough, dyspnea, wheezing, SOB.  Cardiovascular: negative for palpitations, tachycardia, irregular heart beats, chest pain, or peripheral edema.  Gastrointestinal: negative for anorexia, nausea, heartburn/reflux, abdominal pain, hemorrhoids, constipation or diarrhea.  Genitourinary: negative for dysuria, abnormal vaginal discharge/bleeding.  Musculoskeletal: negative for back/joint pain, myalgia.   Neuro: negative for migraine headaches, involuntary movements or tremor  Psychiatric: negative for sleep problems, anxiety, depression.  Hematologic/Lymphatic/Immunologic: negative for anemia, unusual bruising, swollen glands.  Endocrine: negative for temperature intolerance, polydipsia, polyuria.     PHYSICAL EXAMINATION:  There were no vitals taken for this visit.  There is no height or weight on file to calculate BMI.  Wt Readings from Last 4 Encounters:   19 67.1 kg (148 lb)   19 68.4 kg (150 lb 12.7 oz)   18 66.4 kg (146 lb 6.2 oz)   17  68.9 kg (151 lb 12.8 oz)     General appearance:healthy, well developed, well nourished, well-groomed  Psych: alert, no distress, cooperative. Eye contact good, speech goal directed. Affect calm.   Eyes: conjunctivae and sclerae normal, lids and lashes normal, EOMI, PERRLA  ENT: Ears: external ears normal to inspection, canals clear. TMs pearly gray with normal light reflex and anatomic landmarks, Nose/Sinuses: nose shows no deformity, asymmetry, or inflammation, nasal mucosa normal, no sinus tenderness,   Oropharynx: lips normal without lesions, buccal mucosa normal, gums healthy, teeth intact, non-carious, palate normal, tongue midline and normal  Neck: no asymmetry, masses, adenopathy. Thyroid normal to palpation, carotids without bruits, no jugular venous distention  Lungs: clear to auscultation with good excursion, Normal respiratory rate. Chest symmetric no chest wall tenderness.  Cardiovascular: Regular rate and rhythm, no murmur.  No peripheral edema  Abdomen:  Soft, non-tender, no masses, HSM or palpable renal abnormalities. Bowel sounds normal, no bruits heard. No CVAT.  Musculoskeletal: no evidence of joint effusion, ROM of all joints is normal, no crepitation detected, strength grossly normal UE and LE, proximal and distal motor groups. No deformities present  Lymphatic: None significantly enlarged supraclavicular, axillary, or inguinal  Skin: color normal, vascularity normal, no rashes or suspicious lesions, no evidence of bleeding or bruising  Neuro: speech normal, mental status intact, gait grossly normal, muscle tone normal.    LABS    Labs are reviewed and discussed with a patient  Lab Results   Component Value Date/Time    CHOLSTRLTOT 166 07/03/2018 07:53 AM    CHOLSTRLTOT 145 03/16/2010 08:20 AM    LDL 87 07/03/2018 07:53 AM    LDL 71 03/16/2010 08:20 AM    HDL 59 07/03/2018 07:53 AM    HDL 59 03/16/2010 08:20 AM    TRIGLYCERIDE 98 07/03/2018 07:53 AM    TRIGLYCERIDE 76 03/16/2010 08:20 AM        Lab Results   Component Value Date/Time    SODIUM 139 07/03/2018 07:53 AM    SODIUM 131 (L) 05/20/2012 03:00 AM    POTASSIUM 4.0 07/03/2018 07:53 AM    POTASSIUM 3.7 05/20/2012 03:00 AM    CHLORIDE 101 07/03/2018 07:53 AM    CHLORIDE 100 05/20/2012 03:00 AM    CO2 23 07/03/2018 07:53 AM    CO2 21 05/20/2012 03:00 AM    GLUCOSE 87 07/03/2018 07:53 AM    GLUCOSE 109 (H) 05/20/2012 03:00 AM    BUN 14 07/03/2018 07:53 AM    BUN 7 (L) 05/20/2012 03:00 AM    CREATININE 0.77 07/03/2018 07:53 AM    CREATININE 0.67 05/20/2012 03:00 AM    CREATININE 0.80 03/16/2010 08:20 AM    BUNCREATRAT 18 07/03/2018 07:53 AM    BUNCREATRAT 18 03/16/2010 08:20 AM    GLOMRATE >59 03/16/2010 08:20 AM     Lab Results   Component Value Date/Time    ALKPHOSPHAT 64 07/03/2018 07:53 AM    ALKPHOSPHAT 86 05/20/2012 03:00 AM    ASTSGOT 16 07/03/2018 07:53 AM    ASTSGOT 27 05/20/2012 03:00 AM    ALTSGPT 10 07/03/2018 07:53 AM    ALTSGPT 39 05/20/2012 03:00 AM    TBILIRUBIN 0.7 07/03/2018 07:53 AM    TBILIRUBIN 0.9 05/20/2012 03:00 AM      Lab Results   Component Value Date/Time    HBA1C 5.0 09/25/2019 01:24 PM     Lab Results   Component Value Date/Time    TSH 3.150 09/25/2019 01:24 PM    TSH 1.350 07/03/2018 07:53 AM     No results found for: FREET4    Lab Results   Component Value Date/Time    WBC 7.0 09/25/2019 01:24 PM    WBC 6.5 06/15/2016 05:23 PM    WBC 4.4 03/16/2010 08:20 AM    RBC 4.36 09/25/2019 01:24 PM    RBC 4.15 (L) 06/15/2016 05:23 PM    RBC 4.26 03/16/2010 08:20 AM    HEMOGLOBIN 14.8 09/25/2019 01:24 PM    HEMOGLOBIN 14.5 06/15/2016 05:23 PM    HEMATOCRIT 45.1 09/25/2019 01:24 PM    HEMATOCRIT 43.3 06/15/2016 05:23 PM     (H) 09/25/2019 01:24 PM    .3 (H) 06/15/2016 05:23 PM     (H) 03/16/2010 08:20 AM    MCH 33.9 (H) 09/25/2019 01:24 PM    MCH 34.9 (H) 06/15/2016 05:23 PM    MCH 35.0 (H) 03/16/2010 08:20 AM    MCHC 32.8 09/25/2019 01:24 PM    MCHC 33.5 (L) 06/15/2016 05:23 PM    MPV 9.4 06/15/2016 05:23 PM     NEUTSPOLYS 71 09/25/2019 01:24 PM    NEUTSPOLYS 69.60 06/15/2016 05:23 PM    LYMPHOCYTES 20 09/25/2019 01:24 PM    LYMPHOCYTES 20.40 (L) 06/15/2016 05:23 PM    MONOCYTES 8 09/25/2019 01:24 PM    MONOCYTES 7.70 06/15/2016 05:23 PM    EOSINOPHILS 1 09/25/2019 01:24 PM    EOSINOPHILS 1.20 06/15/2016 05:23 PM    BASOPHILS 0 09/25/2019 01:24 PM    BASOPHILS 0.60 06/15/2016 05:23 PM      IMAGING:  Reviewed and discussed from 9/12/2019  Known to large density in the medial inferior left breast.  Numerous microcalcifications in upper right breast    ASSESSMENT/PLAN:  There are no diagnoses linked to this encounter.    1. Annual physical exam  Reviewed PMH, PSH, FH, SH, ALL, MEDS, HCM/IMM.   Advised healthy habits, diet, exercise.    2. Health care maintenance  Per HPI    3. Abnormal mammogram.  4. Breast microcalcifications  Ordered requested diagnostic imaging  - US-BREAST BILAT-LIMITED; Future  - MA-DIAGNOSTIC MAMMO W/CAD-BILAT; Future    5. Pernicious anemia  Controlled, continue current treatment  - cyanocobalamin (VITAMIN B-12) 1000 MCG/ML Solution; INSERT 1 ML BY INTRAMUSCULAR ROUTE EVERY 7 DAYS  Dispense: 12 mL; Refill: 1  - CBC WITH DIFFERENTIAL; Future  - VITAMIN B12; Future  6. Macrocytosis  - cyanocobalamin (VITAMIN B-12) 1000 MCG/ML Solution; INSERT 1 ML BY INTRAMUSCULAR ROUTE EVERY 7 DAYS  Dispense: 12 mL; Refill: 1    7. Macromastia  She was given referral to plastic surgery    8. Hypovitaminosis D  Start high-dose vitamin D  - vitamin D, Ergocalciferol, (DRISDOL) 98671 units Cap capsule; Take 1 Cap by mouth every 7 days.  Dispense: 12 Cap; Refill: 1  - VITAMIN D,25 HYDROXY; Future    Smoking Counseling: Nonsmoker    Next office visit is due in 6 months and prn    All questions are answered.     Please note that this dictation was created using voice recognition software. Despite all efforts, some minor grammar mistakes are possible.

## 2019-12-27 ENCOUNTER — HOSPITAL ENCOUNTER (OUTPATIENT)
Dept: RADIOLOGY | Facility: MEDICAL CENTER | Age: 39
End: 2019-12-27

## 2020-01-02 ENCOUNTER — HOSPITAL ENCOUNTER (OUTPATIENT)
Dept: RADIOLOGY | Facility: MEDICAL CENTER | Age: 40
End: 2020-01-02
Attending: INTERNAL MEDICINE
Payer: COMMERCIAL

## 2020-01-02 DIAGNOSIS — R92.0 BREAST MICROCALCIFICATIONS: ICD-10-CM

## 2020-01-02 PROCEDURE — 77066 DX MAMMO INCL CAD BI: CPT

## 2020-01-02 PROCEDURE — 76642 ULTRASOUND BREAST LIMITED: CPT | Mod: RT

## 2020-01-06 ENCOUNTER — HOSPITAL ENCOUNTER (OUTPATIENT)
Dept: RADIOLOGY | Facility: MEDICAL CENTER | Age: 40
End: 2020-01-06
Attending: INTERNAL MEDICINE
Payer: COMMERCIAL

## 2020-01-06 DIAGNOSIS — R92.8 ABNORMAL FINDING ON BREAST IMAGING: ICD-10-CM

## 2020-01-06 LAB — PATHOLOGY CONSULT NOTE: NORMAL

## 2020-01-06 PROCEDURE — 88342 IMHCHEM/IMCYTCHM 1ST ANTB: CPT

## 2020-01-06 PROCEDURE — 19081 BX BREAST 1ST LESION STRTCTC: CPT

## 2020-01-06 PROCEDURE — 88341 IMHCHEM/IMCYTCHM EA ADD ANTB: CPT

## 2020-01-06 PROCEDURE — 19283 PERQ DEV BREAST 1ST STRTCTC: CPT

## 2020-01-06 PROCEDURE — 88360 TUMOR IMMUNOHISTOCHEM/MANUAL: CPT

## 2020-01-06 PROCEDURE — 88305 TISSUE EXAM BY PATHOLOGIST: CPT | Mod: 59

## 2020-01-08 ENCOUNTER — TELEPHONE (OUTPATIENT)
Dept: RADIOLOGY | Facility: MEDICAL CENTER | Age: 40
End: 2020-01-08

## 2020-01-08 DIAGNOSIS — D05.91 CARCINOMA IN SITU OF RIGHT BREAST, UNSPECIFIED TYPE: ICD-10-CM

## 2020-01-08 DIAGNOSIS — D05.92 CARCINOMA IN SITU OF LEFT BREAST, UNSPECIFIED TYPE: ICD-10-CM

## 2020-01-10 ENCOUNTER — TELEPHONE (OUTPATIENT)
Dept: MEDICAL GROUP | Facility: MEDICAL CENTER | Age: 40
End: 2020-01-10

## 2020-01-10 NOTE — TELEPHONE ENCOUNTER
URGENT  Caller Name: [facility]           Call Back Number: ext [phone]         PATRICIA FROM [facility] CALLED REQUESTING AN URGENT SURGEON REQUEST FOR PT. BX CAME BACK POSITIVE FOR BREAST CANCER.

## 2020-01-14 ENCOUNTER — PATIENT OUTREACH (OUTPATIENT)
Dept: OTHER | Facility: MEDICAL CENTER | Age: 40
End: 2020-01-14

## 2020-01-14 NOTE — PROGRESS NOTES
Oncology nurse navigation called patient received referral from [facility] for patient.  This navigator left message on voicemail with my contact information I also let her know new packet was mailed.

## 2020-01-17 ENCOUNTER — PATIENT OUTREACH (OUTPATIENT)
Dept: OTHER | Facility: MEDICAL CENTER | Age: 40
End: 2020-01-17

## 2020-01-22 ENCOUNTER — PATIENT OUTREACH (OUTPATIENT)
Dept: OTHER | Facility: MEDICAL CENTER | Age: 40
End: 2020-01-22

## 2020-01-22 NOTE — PROGRESS NOTES
Oncology nurse navigator reached out to patient for 3rd time to follow up on introduction letter.  Left my contact information I will sign off at this point patient has not called me back.  Patient does have a referral placed for [facility] for consultation.

## 2020-01-22 NOTE — PROGRESS NOTES
Patient just returned call to nurse navigator she just returned from [facility].  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  She does state that she sees Dr. Holbrook this coming Friday.  We reviewed the new patient packet and some of the classes and spoke about the newly diagnosed breast cancer class.  This navigator answered all of her questions and provided resources.  Patient states that she has alot of support.  I emailed updated calendar for next month. ONN will follow.

## 2020-02-03 NOTE — PROGRESS NOTES
"Non face to face prolonged services of clinical data and chart information reviewed for a total time of 30 performed on 2/2 for [facility]    Reviewed were:    Referral    Previous encounters    Medications    Imaging all imaging procedures (with interpretation) including ultrasound, CT/tomography, MRI or other glandular imaging    Previous procedures all biopsy and surgical procedures done with pathologic studies and interpretation        Notes C50.419 (ICD-10-CM) - Malignant neoplasm of upper-outer quadrant of unspecified female breast                                  Media personal and family molecular and clinical studies performed at outside facilities    Miscellaneous reports    Patient summaries family history as documented by referring clinician        Counseling, testing information and materials prepared    \"I spent 30 minutes  from 1200 to 1230 reviewing past records, prior to visit pertaining to genetic risk.\"     Leonardo Mtz M.D.    edited  "

## 2020-02-04 ENCOUNTER — OFFICE VISIT (OUTPATIENT)
Dept: HEMATOLOGY ONCOLOGY | Facility: MEDICAL CENTER | Age: 40
End: 2020-02-04
Payer: COMMERCIAL

## 2020-02-04 VITALS
SYSTOLIC BLOOD PRESSURE: 132 MMHG | OXYGEN SATURATION: 94 % | WEIGHT: 156.97 LBS | DIASTOLIC BLOOD PRESSURE: 92 MMHG | BODY MASS INDEX: 24.64 KG/M2 | HEART RATE: 86 BPM | TEMPERATURE: 98.6 F | HEIGHT: 67 IN | RESPIRATION RATE: 18 BRPM

## 2020-02-04 DIAGNOSIS — Z15.01 BREAST CANCER GENETIC SUSCEPTIBILITY: ICD-10-CM

## 2020-02-04 DIAGNOSIS — Z00.00 HEALTH CARE MAINTENANCE: ICD-10-CM

## 2020-02-04 DIAGNOSIS — D51.0 PERNICIOUS ANEMIA: ICD-10-CM

## 2020-02-04 DIAGNOSIS — D75.89 MACROCYTOSIS: ICD-10-CM

## 2020-02-04 DIAGNOSIS — N62 MACROMASTIA: ICD-10-CM

## 2020-02-04 DIAGNOSIS — C50.311 MALIGNANT NEOPLASM OF LOWER-INNER QUADRANT OF RIGHT FEMALE BREAST, UNSPECIFIED ESTROGEN RECEPTOR STATUS (HCC): ICD-10-CM

## 2020-02-04 DIAGNOSIS — E55.9 HYPOVITAMINOSIS D: ICD-10-CM

## 2020-02-04 PROCEDURE — 36415 COLL VENOUS BLD VENIPUNCTURE: CPT | Performed by: MEDICAL GENETICS

## 2020-02-04 PROCEDURE — 99203 OFFICE O/P NEW LOW 30 MIN: CPT | Mod: 25 | Performed by: MEDICAL GENETICS

## 2020-02-04 NOTE — PROGRESS NOTES
GENETIC RISK ASSESSMENT    Tiarra Chavez is a 39 y.o. year old patient who was referred by Yusef for cancer genetic risk assessment.    Chief Complaint: breast cancer (age 39)    HPI: The patient was well until 1 month ago at which time a suspicious physical exam caused the patient to be referred for imaging ). A suspicious (mammogram study identified a (right) breast mass. A biopsy was performed and a specimen was submitted to pathology.     A diagnosis of  carcinoma was made.   The patients father: lymphoma  Review of personal and family histories suggested that a cancer genetic risk assessment was in order.    Review of Systems (ROS):  Constitutional N  Eyes N  ENT N   Respiratory N  Cardiovascular N  Gastrointestinal N  Genitourinary N  Musculoskeletal N  Skin N  Neurological N  Endocrine N  Psychiatric N  Hematologic/lymphatic N  Allergic/Immunologic nickel  All other systems reviewed and are negative.    History (Past/Family)  Family History:   Family History   Problem Relation Age of Onset   • Hypertension Mother    • Thyroid Mother         Hypothyroid   • Hyperlipidemia Father    • Cancer Father         T cell lymphoma   • Heart Disease Maternal Grandfather         MI 38 yo, unknown RF.   • Cancer Paternal Uncle         lymphoma      3 generation pedigree built   Yes   Ryer-Марияck empiric risk calculation No   Abhijeet II empiric risk calculation  No    Social History:       Social History     Socioeconomic History   • Marital status: Single     Spouse name: Not on file   • Number of children: Not on file   • Years of education: Not on file   • Highest education level: Not on file   Occupational History   • Not on file   Social Needs   • Financial resource strain: Not on file   • Food insecurity:     Worry: Not on file     Inability: Not on file   • Transportation needs:     Medical: Not on file     Non-medical: Not on file   Tobacco Use   • Smoking status: Never Smoker   • Smokeless tobacco: Never Used    Substance and Sexual Activity   • Alcohol use: No   • Drug use: No   • Sexual activity: Not Currently     Partners: Male     Birth control/protection: Condom, Pill   Lifestyle   • Physical activity:     Days per week: Not on file     Minutes per session: Not on file   • Stress: Not on file   Relationships   • Social connections:     Talks on phone: Not on file     Gets together: Not on file     Attends Congregational service: Not on file     Active member of club or organization: Not on file     Attends meetings of clubs or organizations: Not on file     Relationship status: Not on file   • Intimate partner violence:     Fear of current or ex partner: Not on file     Emotionally abused: Not on file     Physically abused: Not on file     Forced sexual activity: Not on file   Other Topics Concern   • Not on file   Social History Narrative   • Not on file       Patient Past History:  Past Medical History:   Diagnosis Date   • Allergic rhinitis 4/7/2011   • Headache(784.0)     When in school, not a problem now.  With sleep deprivation.   • Pernicious anemia            NCCN Testing Criteria Met                            Yes    Physical Exam  Constitutional    There were no vitals taken for this visit.        General appearance: normal   Head Circumference:   ([place])  Eyes    Conjunctiva: clear   Pupils: reactive     ENMT    External inspection: noraml   Assessment of Hearing: normla   Lips/cheeks/gums: no lesions  Neck   External exam: normla   Thyroid: no masses  Respiratory   Auscultation: clear    Cardiovascular   Auscultation: RRR   Edema : none  Chest   Breasts (exam): not done   Palpation:   GI   External exam and palpation: normal     Pelvic (female): not done   External exam (male):   Lymphatic   Palpation in 2 areas: normal    Musculoskeletal   Gait: normla   Digits and Nails: no lesions  Skin   Inspection: normla   Palpation: no masses                  Fibrofolliculoma: absent                   Trichodiscoma: absent                   Akrochordon: absent                   CAfe-au-lait:  absent                  Hypopigmentation: absent                  Mucosal freckling:  absent  Neurologic   Cranial nerves: intact   DTR’s: 2+       Assessment and Plan:  Patient with recent diagnosis of breast cancer at age 39    I spent a total of 40 minutes of face to face time with >50% of time spent in counseling and coordination of care discussing matters stated in above note.    Ambry panel ordered      Leonardo Mtz M.D.

## 2020-02-04 NOTE — NON-PROVIDER
Tiarra Chavez is a 39 y.o. female here for: Lab Draws  on 2/4/2020 at 2:13 PM    Procedure Performed: Venipuncture     Anatomical site: Left Antecubital Area (AC)    Equipment used: 23g butterfly    Labs drawn: Statusly (two lavender EDTA tubes)    Ordering Provider: {Dr. Leonardo Mtz    Eduardo By: Jannie Michel, Med Ass't

## 2020-02-12 ENCOUNTER — HOSPITAL ENCOUNTER (OUTPATIENT)
Dept: RADIOLOGY | Facility: MEDICAL CENTER | Age: 40
End: 2020-02-12
Attending: INTERNAL MEDICINE
Payer: COMMERCIAL

## 2020-02-12 DIAGNOSIS — C50.911 MALIGNANT NEOPLASM OF RIGHT FEMALE BREAST, UNSPECIFIED ESTROGEN RECEPTOR STATUS, UNSPECIFIED SITE OF BREAST (HCC): ICD-10-CM

## 2020-02-12 PROCEDURE — 700117 HCHG RX CONTRAST REV CODE 255: Performed by: INTERNAL MEDICINE

## 2020-02-12 PROCEDURE — 71260 CT THORAX DX C+: CPT

## 2020-02-12 RX ADMIN — IOHEXOL 100 ML: 350 INJECTION, SOLUTION INTRAVENOUS at 11:19

## 2020-02-12 RX ADMIN — IOHEXOL 25 ML: 240 INJECTION, SOLUTION INTRATHECAL; INTRAVASCULAR; INTRAVENOUS; ORAL at 11:20

## 2020-02-24 ENCOUNTER — HOSPITAL ENCOUNTER (OUTPATIENT)
Dept: RADIOLOGY | Facility: MEDICAL CENTER | Age: 40
End: 2020-02-24
Attending: INTERNAL MEDICINE
Payer: COMMERCIAL

## 2020-02-24 DIAGNOSIS — C50.911 MALIGNANT NEOPLASM OF RIGHT FEMALE BREAST, UNSPECIFIED ESTROGEN RECEPTOR STATUS, UNSPECIFIED SITE OF BREAST (HCC): ICD-10-CM

## 2020-02-24 PROCEDURE — A9503 TC99M MEDRONATE: HCPCS

## 2020-03-13 ENCOUNTER — HOSPITAL ENCOUNTER (OUTPATIENT)
Dept: RADIOLOGY | Facility: MEDICAL CENTER | Age: 40
End: 2020-03-13
Attending: INTERNAL MEDICINE
Payer: COMMERCIAL

## 2020-03-13 DIAGNOSIS — C50.911 MALIGNANT NEOPLASM OF RIGHT FEMALE BREAST, UNSPECIFIED ESTROGEN RECEPTOR STATUS, UNSPECIFIED SITE OF BREAST (HCC): ICD-10-CM

## 2020-03-13 PROCEDURE — A9581 GADOXETATE DISODIUM INJ: HCPCS | Performed by: INTERNAL MEDICINE

## 2020-03-13 PROCEDURE — 700117 HCHG RX CONTRAST REV CODE 255: Performed by: INTERNAL MEDICINE

## 2020-03-13 PROCEDURE — 74183 MRI ABD W/O CNTR FLWD CNTR: CPT

## 2020-03-13 RX ADMIN — GADOXETATE DISODIUM 10 ML: 181.43 INJECTION, SOLUTION INTRAVENOUS at 11:36

## 2020-03-24 RX ORDER — ACETAMINOPHEN 500 MG
500-1000 TABLET ORAL EVERY 6 HOURS PRN
Status: ON HOLD | COMMUNITY
End: 2020-03-26

## 2020-03-25 NOTE — OR NURSING
COVID-19 Pre-surgery screenin. Do you have an undiagnosed respiratory illness or symptoms such as coughing or sneezing? No (Yes/No)  a. Onset of Sx   b. Acute vs. chronic respiratory illness     2. Do you have an unexplained fever greater than 100.4 degrees Fahrenheit or 38 degrees Celsius?     No (Yes/No)    3. Have you had direct exposure to a patient who tested positive for Covid-19?    No (Yes/No)    4. Have you traveled within the last 14 days out of the country or [place], [place], [place]?    No (Yes/No)

## 2020-03-26 ENCOUNTER — ANESTHESIA (OUTPATIENT)
Dept: SURGERY | Facility: MEDICAL CENTER | Age: 40
End: 2020-03-26
Payer: COMMERCIAL

## 2020-03-26 ENCOUNTER — ANESTHESIA EVENT (OUTPATIENT)
Dept: SURGERY | Facility: MEDICAL CENTER | Age: 40
End: 2020-03-26
Payer: COMMERCIAL

## 2020-03-26 ENCOUNTER — HOSPITAL ENCOUNTER (OUTPATIENT)
Facility: MEDICAL CENTER | Age: 40
End: 2020-03-27
Attending: SURGERY | Admitting: SURGERY
Payer: COMMERCIAL

## 2020-03-26 ENCOUNTER — HOSPITAL ENCOUNTER (OUTPATIENT)
Dept: RADIOLOGY | Facility: MEDICAL CENTER | Age: 40
End: 2020-03-26
Attending: SURGERY
Payer: COMMERCIAL

## 2020-03-26 DIAGNOSIS — C50.411 MALIGNANT NEOPLASM OF UPPER-OUTER QUADRANT OF RIGHT FEMALE BREAST, UNSPECIFIED ESTROGEN RECEPTOR STATUS (HCC): ICD-10-CM

## 2020-03-26 LAB
HCG UR QL: NEGATIVE
PATHOLOGY CONSULT NOTE: NORMAL

## 2020-03-26 PROCEDURE — 700101 HCHG RX REV CODE 250: Performed by: SURGERY

## 2020-03-26 PROCEDURE — 160047 HCHG PACU  - EA ADDL 30 MINS PHASE II: Performed by: SURGERY

## 2020-03-26 PROCEDURE — A9270 NON-COVERED ITEM OR SERVICE: HCPCS

## 2020-03-26 PROCEDURE — 700105 HCHG RX REV CODE 258: Performed by: SURGERY

## 2020-03-26 PROCEDURE — 700111 HCHG RX REV CODE 636 W/ 250 OVERRIDE (IP): Performed by: ANESTHESIOLOGY

## 2020-03-26 PROCEDURE — 160048 HCHG OR STATISTICAL LEVEL 1-5: Performed by: SURGERY

## 2020-03-26 PROCEDURE — 160029 HCHG SURGERY MINUTES - 1ST 30 MINS LEVEL 4: Performed by: SURGERY

## 2020-03-26 PROCEDURE — 700102 HCHG RX REV CODE 250 W/ 637 OVERRIDE(OP)

## 2020-03-26 PROCEDURE — 700111 HCHG RX REV CODE 636 W/ 250 OVERRIDE (IP): Performed by: SURGERY

## 2020-03-26 PROCEDURE — 700102 HCHG RX REV CODE 250 W/ 637 OVERRIDE(OP): Performed by: SURGERY

## 2020-03-26 PROCEDURE — 160046 HCHG PACU - 1ST 60 MINS PHASE II: Performed by: SURGERY

## 2020-03-26 PROCEDURE — 160025 RECOVERY II MINUTES (STATS): Performed by: SURGERY

## 2020-03-26 PROCEDURE — 160041 HCHG SURGERY MINUTES - EA ADDL 1 MIN LEVEL 4: Performed by: SURGERY

## 2020-03-26 PROCEDURE — 38792 RA TRACER ID OF SENTINL NODE: CPT | Mod: RT

## 2020-03-26 PROCEDURE — 160002 HCHG RECOVERY MINUTES (STAT): Performed by: SURGERY

## 2020-03-26 PROCEDURE — A6402 STERILE GAUZE <= 16 SQ IN: HCPCS | Performed by: SURGERY

## 2020-03-26 PROCEDURE — 500389 HCHG DRAIN, RESERVOIR SUCT JP 100CC: Performed by: SURGERY

## 2020-03-26 PROCEDURE — 700101 HCHG RX REV CODE 250: Performed by: ANESTHESIOLOGY

## 2020-03-26 PROCEDURE — 501838 HCHG SUTURE GENERAL: Performed by: SURGERY

## 2020-03-26 PROCEDURE — G0378 HOSPITAL OBSERVATION PER HR: HCPCS

## 2020-03-26 PROCEDURE — 160009 HCHG ANES TIME/MIN: Performed by: SURGERY

## 2020-03-26 PROCEDURE — 81025 URINE PREGNANCY TEST: CPT

## 2020-03-26 PROCEDURE — 160035 HCHG PACU - 1ST 60 MINS PHASE I: Performed by: SURGERY

## 2020-03-26 PROCEDURE — A9270 NON-COVERED ITEM OR SERVICE: HCPCS | Performed by: SURGERY

## 2020-03-26 PROCEDURE — 88307 TISSUE EXAM BY PATHOLOGIST: CPT | Mod: 59

## 2020-03-26 PROCEDURE — 500371 HCHG DRAIN, BLAKE 10MM: Performed by: SURGERY

## 2020-03-26 PROCEDURE — 500054 HCHG BANDAGE, ELASTIC 6: Performed by: SURGERY

## 2020-03-26 PROCEDURE — 88309 TISSUE EXAM BY PATHOLOGIST: CPT

## 2020-03-26 RX ORDER — MEPERIDINE HYDROCHLORIDE 25 MG/ML
INJECTION INTRAMUSCULAR; INTRAVENOUS; SUBCUTANEOUS PRN
Status: DISCONTINUED | OUTPATIENT
Start: 2020-03-26 | End: 2020-03-26 | Stop reason: SURG

## 2020-03-26 RX ORDER — METOCLOPRAMIDE HYDROCHLORIDE 5 MG/ML
INJECTION INTRAMUSCULAR; INTRAVENOUS PRN
Status: DISCONTINUED | OUTPATIENT
Start: 2020-03-26 | End: 2020-03-26 | Stop reason: HOSPADM

## 2020-03-26 RX ORDER — OXYCODONE HCL 5 MG/5 ML
10 SOLUTION, ORAL ORAL
Status: COMPLETED | OUTPATIENT
Start: 2020-03-26 | End: 2020-03-26

## 2020-03-26 RX ORDER — CEFAZOLIN SODIUM 1 G/3ML
INJECTION, POWDER, FOR SOLUTION INTRAMUSCULAR; INTRAVENOUS PRN
Status: DISCONTINUED | OUTPATIENT
Start: 2020-03-26 | End: 2020-03-26 | Stop reason: SURG

## 2020-03-26 RX ORDER — LIDOCAINE AND PRILOCAINE 25; 25 MG/G; MG/G
CREAM TOPICAL
Status: COMPLETED | OUTPATIENT
Start: 2020-03-26 | End: 2020-03-26

## 2020-03-26 RX ORDER — ALPRAZOLAM 0.25 MG/1
0.25 TABLET ORAL
Status: COMPLETED | OUTPATIENT
Start: 2020-03-26 | End: 2020-03-26

## 2020-03-26 RX ORDER — SODIUM CHLORIDE, SODIUM LACTATE, POTASSIUM CHLORIDE, CALCIUM CHLORIDE 600; 310; 30; 20 MG/100ML; MG/100ML; MG/100ML; MG/100ML
INJECTION, SOLUTION INTRAVENOUS CONTINUOUS
Status: DISCONTINUED | OUTPATIENT
Start: 2020-03-26 | End: 2020-03-26

## 2020-03-26 RX ORDER — SODIUM CHLORIDE, SODIUM LACTATE, POTASSIUM CHLORIDE, CALCIUM CHLORIDE 600; 310; 30; 20 MG/100ML; MG/100ML; MG/100ML; MG/100ML
INJECTION, SOLUTION INTRAVENOUS CONTINUOUS
Status: DISCONTINUED | OUTPATIENT
Start: 2020-03-26 | End: 2020-03-26 | Stop reason: HOSPADM

## 2020-03-26 RX ORDER — OXYCODONE HCL 5 MG/5 ML
SOLUTION, ORAL ORAL
Status: COMPLETED
Start: 2020-03-26 | End: 2020-03-26

## 2020-03-26 RX ORDER — BUPIVACAINE HYDROCHLORIDE 2.5 MG/ML
INJECTION, SOLUTION EPIDURAL; INFILTRATION; INTRACAUDAL
Status: DISCONTINUED | OUTPATIENT
Start: 2020-03-26 | End: 2020-03-26 | Stop reason: HOSPADM

## 2020-03-26 RX ORDER — ONDANSETRON 2 MG/ML
INJECTION INTRAMUSCULAR; INTRAVENOUS PRN
Status: DISCONTINUED | OUTPATIENT
Start: 2020-03-26 | End: 2020-03-26 | Stop reason: SURG

## 2020-03-26 RX ORDER — OXYCODONE HCL 5 MG/5 ML
5 SOLUTION, ORAL ORAL
Status: COMPLETED | OUTPATIENT
Start: 2020-03-26 | End: 2020-03-26

## 2020-03-26 RX ORDER — LABETALOL HYDROCHLORIDE 5 MG/ML
5 INJECTION, SOLUTION INTRAVENOUS
Status: DISCONTINUED | OUTPATIENT
Start: 2020-03-26 | End: 2020-03-26 | Stop reason: HOSPADM

## 2020-03-26 RX ORDER — SODIUM CHLORIDE, SODIUM LACTATE, POTASSIUM CHLORIDE, CALCIUM CHLORIDE 600; 310; 30; 20 MG/100ML; MG/100ML; MG/100ML; MG/100ML
1000 INJECTION, SOLUTION INTRAVENOUS CONTINUOUS
Status: DISCONTINUED | OUTPATIENT
Start: 2020-03-26 | End: 2020-03-27 | Stop reason: HOSPADM

## 2020-03-26 RX ORDER — HALOPERIDOL 5 MG/ML
1 INJECTION INTRAMUSCULAR
Status: DISCONTINUED | OUTPATIENT
Start: 2020-03-26 | End: 2020-03-26 | Stop reason: HOSPADM

## 2020-03-26 RX ORDER — DIPHENHYDRAMINE HYDROCHLORIDE 50 MG/ML
12.5 INJECTION INTRAMUSCULAR; INTRAVENOUS
Status: DISCONTINUED | OUTPATIENT
Start: 2020-03-26 | End: 2020-03-26 | Stop reason: HOSPADM

## 2020-03-26 RX ORDER — ONDANSETRON 2 MG/ML
4 INJECTION INTRAMUSCULAR; INTRAVENOUS
Status: DISCONTINUED | OUTPATIENT
Start: 2020-03-26 | End: 2020-03-26 | Stop reason: HOSPADM

## 2020-03-26 RX ORDER — SODIUM CHLORIDE 9 MG/ML
INJECTION, SOLUTION INTRAVENOUS CONTINUOUS
Status: DISCONTINUED | OUTPATIENT
Start: 2020-03-26 | End: 2020-03-27 | Stop reason: HOSPADM

## 2020-03-26 RX ORDER — HYDROCODONE BITARTRATE AND ACETAMINOPHEN 5; 325 MG/1; MG/1
1-2 TABLET ORAL EVERY 6 HOURS PRN
Status: DISCONTINUED | OUTPATIENT
Start: 2020-03-26 | End: 2020-03-27 | Stop reason: HOSPADM

## 2020-03-26 RX ORDER — MIDAZOLAM HYDROCHLORIDE 1 MG/ML
1 INJECTION INTRAMUSCULAR; INTRAVENOUS
Status: DISCONTINUED | OUTPATIENT
Start: 2020-03-26 | End: 2020-03-26 | Stop reason: HOSPADM

## 2020-03-26 RX ORDER — MEPERIDINE HYDROCHLORIDE 25 MG/ML
25 INJECTION INTRAMUSCULAR; INTRAVENOUS; SUBCUTANEOUS
Status: DISCONTINUED | OUTPATIENT
Start: 2020-03-26 | End: 2020-03-26 | Stop reason: HOSPADM

## 2020-03-26 RX ADMIN — CEFAZOLIN 2 G: 330 INJECTION, POWDER, FOR SOLUTION INTRAMUSCULAR; INTRAVENOUS at 11:46

## 2020-03-26 RX ADMIN — ALPRAZOLAM 0.25 MG: 0.25 TABLET ORAL at 09:33

## 2020-03-26 RX ADMIN — Medication 10 MG: at 13:35

## 2020-03-26 RX ADMIN — MEPERIDINE HYDROCHLORIDE 12.5 MG: 25 INJECTION INTRAMUSCULAR; INTRAVENOUS; SUBCUTANEOUS at 13:07

## 2020-03-26 RX ADMIN — Medication 40 MG: at 11:50

## 2020-03-26 RX ADMIN — SODIUM CHLORIDE, POTASSIUM CHLORIDE, SODIUM LACTATE AND CALCIUM CHLORIDE: 600; 310; 30; 20 INJECTION, SOLUTION INTRAVENOUS at 11:46

## 2020-03-26 RX ADMIN — LIDOCAINE HYDROCHLORIDE 20 MG: 20 INJECTION, SOLUTION INTRAVENOUS at 11:47

## 2020-03-26 RX ADMIN — EPHEDRINE SULFATE 10 MG: 50 INJECTION INTRAMUSCULAR; INTRAVENOUS; SUBCUTANEOUS at 11:53

## 2020-03-26 RX ADMIN — PROPOFOL 20 MG: 10 INJECTION, EMULSION INTRAVENOUS at 11:47

## 2020-03-26 RX ADMIN — SODIUM CHLORIDE, POTASSIUM CHLORIDE, SODIUM LACTATE AND CALCIUM CHLORIDE 1000 ML: 600; 310; 30; 20 INJECTION, SOLUTION INTRAVENOUS at 23:18

## 2020-03-26 RX ADMIN — ONDANSETRON 4 MG: 2 INJECTION INTRAMUSCULAR; INTRAVENOUS at 13:05

## 2020-03-26 RX ADMIN — OXYCODONE HYDROCHLORIDE 10 MG: 5 SOLUTION ORAL at 13:35

## 2020-03-26 RX ADMIN — SODIUM CHLORIDE, POTASSIUM CHLORIDE, SODIUM LACTATE AND CALCIUM CHLORIDE 1000 ML: 600; 310; 30; 20 INJECTION, SOLUTION INTRAVENOUS at 22:53

## 2020-03-26 RX ADMIN — METOCLOPRAMIDE 10 MG: 5 INJECTION, SOLUTION INTRAMUSCULAR; INTRAVENOUS at 13:13

## 2020-03-26 RX ADMIN — PROPOFOL 60 MG: 10 INJECTION, EMULSION INTRAVENOUS at 11:50

## 2020-03-26 RX ADMIN — MEPERIDINE HYDROCHLORIDE 12.5 MG: 25 INJECTION INTRAMUSCULAR; INTRAVENOUS; SUBCUTANEOUS at 13:02

## 2020-03-26 RX ADMIN — ALFENTANIL HYDROCHLORIDE 750 MCG: 500 INJECTION INTRAVENOUS at 11:47

## 2020-03-26 RX ADMIN — MEPERIDINE HYDROCHLORIDE 12.5 MG: 25 INJECTION INTRAMUSCULAR; INTRAVENOUS; SUBCUTANEOUS at 11:58

## 2020-03-26 RX ADMIN — LIDOCAINE AND PRILOCAINE 1 APPLICATORFUL: 25; 25 CREAM TOPICAL at 09:34

## 2020-03-26 RX ADMIN — ROCURONIUM BROMIDE 5 MG: 10 INJECTION, SOLUTION INTRAVENOUS at 11:47

## 2020-03-26 RX ADMIN — MEPERIDINE HYDROCHLORIDE 12.5 MG: 25 INJECTION INTRAMUSCULAR; INTRAVENOUS; SUBCUTANEOUS at 12:12

## 2020-03-26 RX ADMIN — SODIUM CHLORIDE, POTASSIUM CHLORIDE, SODIUM LACTATE AND CALCIUM CHLORIDE: 600; 310; 30; 20 INJECTION, SOLUTION INTRAVENOUS at 12:59

## 2020-03-26 ASSESSMENT — LIFESTYLE VARIABLES
ALCOHOL_USE: NO
EVER_SMOKED: NEVER
TOTAL SCORE: 0
EVER HAD A DRINK FIRST THING IN THE MORNING TO STEADY YOUR NERVES TO GET RID OF A HANGOVER: NO
TOTAL SCORE: 0
AVERAGE NUMBER OF DAYS PER WEEK YOU HAVE A DRINK CONTAINING ALCOHOL: 0
TOTAL SCORE: 0
ON A TYPICAL DAY WHEN YOU DRINK ALCOHOL HOW MANY DRINKS DO YOU HAVE: 0
EVER FELT BAD OR GUILTY ABOUT YOUR DRINKING: NO
HOW MANY TIMES IN THE PAST YEAR HAVE YOU HAD 5 OR MORE DRINKS IN A DAY: 0
HAVE PEOPLE ANNOYED YOU BY CRITICIZING YOUR DRINKING: NO
CONSUMPTION TOTAL: NEGATIVE
HAVE YOU EVER FELT YOU SHOULD CUT DOWN ON YOUR DRINKING: NO

## 2020-03-26 ASSESSMENT — PATIENT HEALTH QUESTIONNAIRE - PHQ9
2. FEELING DOWN, DEPRESSED, IRRITABLE, OR HOPELESS: NOT AT ALL
SUM OF ALL RESPONSES TO PHQ9 QUESTIONS 1 AND 2: 0
1. LITTLE INTEREST OR PLEASURE IN DOING THINGS: NOT AT ALL

## 2020-03-26 ASSESSMENT — FIBROSIS 4 INDEX: FIB4 SCORE: 0.49

## 2020-03-26 ASSESSMENT — PAIN SCALES - GENERAL: PAIN_LEVEL: 1

## 2020-03-26 NOTE — OR NURSING
Pt recovering well at this time. Having some pain in breasts r/t surgery. Receiving medication with some relief. Written script on chart, d/c order placed. Friend was called and updated with pt status and going to d/c area soon. prevena dressing over breats, 2 MARINA drains attached, draining properly. Pt has no belongings in PACU. A&Ox4, VSS, tolerating sips of water with no issue. Pt currently resting in bed in no noted distress.

## 2020-03-26 NOTE — ANESTHESIA PREPROCEDURE EVALUATION
Relevant Problems   No relevant active problems       Physical Exam    Airway   Mallampati: II  TM distance: >3 FB  Neck ROM: full       Cardiovascular - normal exam  Rhythm: regular  Rate: normal     Dental - normal exam         Pulmonary - normal exam  Breath sounds clear to auscultation     Abdominal    Neurological - normal exam                 Anesthesia Plan    ASA 1       Plan - general       Airway plan will be LMA        Induction: intravenous    Postoperative Plan: Postoperative administration of opioids is intended.    Pertinent diagnostic labs and testing reviewed    Informed Consent:    Anesthetic plan and risks discussed with patient.    Use of blood products discussed with: patient whom consented to blood products.

## 2020-03-26 NOTE — ANESTHESIA PROCEDURE NOTES
Airway  Date/Time: 3/26/2020 11:51 AM  Performed by: All Crowe M.D.  Authorized by: All Crowe M.D.     Final Airway Type:  Supraglottic airway  Final Supraglottic Airway:  Standard LMA  SGA Size:  3

## 2020-03-26 NOTE — ANESTHESIA QCDR
2019 [facility] Clinical Data Registry (for Quality Improvement)     Postoperative nausea/vomiting risk protocol (Adult = 18 yrs and Pediatric 3-17 yrs)- (430 and 463)  General inhalation anesthetic (NOT TIVA) with PONV risk factors: Yes  Provision of anti-emetic therapy with at least 2 different classes of agents: Yes   Patient DID NOT receive anti-emetic therapy and reason is documented in Medical Record:  N/A    Multimodal Pain Management- (477)  Non-emergent surgery AND patient age >= 18: No  Use of Multimodal Pain Management, two or more drugs and/or interventions, NOT including systemic opioids:   Exception: Documented allergy to multiple classes of analgesics:     Smoking Abstinence (404)  Patient is current smoker (cigarette, pipe, e-cig, marijuanna): No  Elective Surgery:   Abstinence instructions provided prior to day of surgery:   Patient abstained from smoking on day of surgery:     Pre-Op Beta-Blocker in Isolated CABG (44)  Isolated CABG AND patient age >= 18: No  Beta-blocker admin within 24 hours of surgical incision:   Exception:of medical reason(s) for not administering beta blocker within 24 hours prior to surgical incision (e.g., not  indicated,other medical reason):     PACU assessment of acute postoperative pain prior to Anesthesia Care End- Applies to Patients Age = 18- (ABG7)  Initial PACU pain score is which of the following: < 7/10  Patient unable to report pain score: N/A    Post-anesthetic transfer of care checklist/protocol to PACU/ICU- (426 and 427)  Upon conclusion of case, patient transferred to which of the following locations: PACU/Non-ICU  Use of transfer checklist/protocol: Yes  Exclusion: Service Performed in Patient Hospital Room (and thus did not require transfer): N/A  Unplanned admission to ICU related to anesthesia service up through end of PACU care- (MD51)  Unplanned admission to ICU (not initially anticipated at anesthesia start time):

## 2020-03-26 NOTE — OP REPORT
DATE OF SERVICE:  03/26/2020    PREOPERATIVE DIAGNOSIS:  Right-sided breast cancer.    POSTOPERATIVE DIAGNOSIS:  Right-sided breast cancer.    PROCEDURES:  Bilateral simple mastectomy with a right sentinel lymph node   biopsy.    SURGEON:  Sophy Holbrook MD    ASSISTANT:  JOANN Rivas    ANESTHESIA:  Laryngeal mask.    ANESTHESIOLOGIST:  All Crowe MD    INDICATIONS:  The patient is a 39-year-old female who has a large right-sided   breast cancer that has been biopsied and was found to be ER/MI positive.  It   was elected that she would not be a good candidate for neoadjuvant therapy.    She is being brought to the operating room at this time for a bilateral simple   mastectomy and sentinel node biopsy on the right.    FINDINGS:  Large tumor that was very vascular in the right breast.  Simple   mastectomy was performed.  Two sentinel lymph nodes were identified.  On the   left side, there was also a vascular breast, but no obvious masses noted.    PROCEDURE:  After the patient was identified and consented, she was brought to   the operating room and placed in supine position.  The right side was done   before the left, but both sides done similarly.  Elliptical incision was made   around the circumareolar area.  Superior and inferior skin flaps were then   developed on the right side, it was made higher than usual because of where   the position of the large tumor was.  The breast was then removed from the   chest wall using electrocautery.  On the right side, the axillary fascia was   opened using navigator probe, 2 lymph nodes were identified, they were    from surrounding tissue using LigaSure device.  Both wounds were   irrigated.  The wound was closed with 3-0 Vicryl subcutaneous layer and the   skin was closed with running 4-0 Vicryls.  MARINA drains were placed bilaterally   prior to closure of the wound.  Steri-Strips and dry dressing and a Prevena   were placed over the wounds.   Patient was extubated and taken to recovery in   stable condition.  All sponge and needle counts were correct.             ____________________________________     STEVEN JONAS MD    [facility]    DD:  03/26/2020 13:19:42  DT:  03/26/2020 13:39:02    D#:  [id number]  Job#:  [id number]    cc: JANNETH JUNG MD, Beatris Pathak MD

## 2020-03-26 NOTE — ANESTHESIA TIME REPORT
Anesthesia Start and Stop Event Times     Date Time Event    3/26/2020 1100 Ready for Procedure     1146 Anesthesia Start     1326 Anesthesia Stop        Responsible Staff  03/26/20    Name Role Begin End    All Crowe M.D. Anesth 1146 1326        Preop Diagnosis (Free Text):  Pre-op Diagnosis     BREAST CANCER, UPPER OUTER QUADRANT OF FEMALE BREAST        Preop Diagnosis (Codes):    Post op Diagnosis  Breast cancer (HCC)      Premium Reason  Non-Premium    Comments:

## 2020-03-26 NOTE — ANESTHESIA POSTPROCEDURE EVALUATION
Patient: Tiarra Kaplan Chavez    Procedure Summary     Date:  03/26/20 Room / Location:  [facility] 08 / SURGERY [facility]    Anesthesia Start:  1146 Anesthesia Stop:  1326    Procedures:       MASTECTOMY (Bilateral Breast)      BIOPSY, LYMPH NODE, SENTINEL (Right Breast) Diagnosis:  (BREAST CANCER, UPPER OUTER QUADRANT OF FEMALE BREAST)    Surgeon:  Sophy Holbrook M.D. Responsible Provider:  All Crowe M.D.    Anesthesia Type:  general ASA Status:  1          Final Anesthesia Type: general  Last vitals  BP   Blood Pressure: 111/86    Temp   36.2 °C (97.2 °F)    Pulse   Pulse: 68   Resp   17    SpO2   97 %      Anesthesia Post Evaluation    Patient location during evaluation: PACU  Patient participation: complete - patient participated  Level of consciousness: awake and alert  Pain score: 1    Airway patency: patent  Anesthetic complications: no  Cardiovascular status: hemodynamically stable  Respiratory status: acceptable  Hydration status: euvolemic    PONV: none           Nurse Pain Score: 0 (NPRS)

## 2020-03-27 VITALS
WEIGHT: 155.65 LBS | SYSTOLIC BLOOD PRESSURE: 103 MMHG | RESPIRATION RATE: 16 BRPM | OXYGEN SATURATION: 97 % | TEMPERATURE: 98.7 F | HEIGHT: 66 IN | BODY MASS INDEX: 25.01 KG/M2 | HEART RATE: 71 BPM | DIASTOLIC BLOOD PRESSURE: 61 MMHG

## 2020-03-27 PROBLEM — C50.919 BREAST CANCER IN FEMALE (HCC): Status: ACTIVE | Noted: 2020-03-27

## 2020-03-27 PROBLEM — G89.18 PAIN FOLLOWING SURGERY OR PROCEDURE: Status: ACTIVE | Noted: 2020-03-27

## 2020-03-27 LAB
ERYTHROCYTE [DISTWIDTH] IN BLOOD BY AUTOMATED COUNT: 48.9 FL (ref 35.9–50)
HCT VFR BLD AUTO: 26.9 % (ref 37–47)
HGB BLD-MCNC: 8.8 G/DL (ref 12–16)
MCH RBC QN AUTO: 35.2 PG (ref 27–33)
MCHC RBC AUTO-ENTMCNC: 32.7 G/DL (ref 33.6–35)
MCV RBC AUTO: 107.6 FL (ref 81.4–97.8)
PLATELET # BLD AUTO: 252 K/UL (ref 164–446)
PMV BLD AUTO: 9.3 FL (ref 9–12.9)
RBC # BLD AUTO: 2.5 M/UL (ref 4.2–5.4)
WBC # BLD AUTO: 9 K/UL (ref 4.8–10.8)

## 2020-03-27 PROCEDURE — 85027 COMPLETE CBC AUTOMATED: CPT

## 2020-03-27 PROCEDURE — 36415 COLL VENOUS BLD VENIPUNCTURE: CPT

## 2020-03-27 PROCEDURE — G0378 HOSPITAL OBSERVATION PER HR: HCPCS

## 2020-03-27 PROCEDURE — 700102 HCHG RX REV CODE 250 W/ 637 OVERRIDE(OP): Performed by: SURGERY

## 2020-03-27 PROCEDURE — 700105 HCHG RX REV CODE 258: Performed by: SURGERY

## 2020-03-27 PROCEDURE — A9270 NON-COVERED ITEM OR SERVICE: HCPCS | Performed by: SURGERY

## 2020-03-27 RX ORDER — ACETAMINOPHEN 500 MG
500 TABLET ORAL EVERY 6 HOURS PRN
Status: DISCONTINUED | OUTPATIENT
Start: 2020-03-27 | End: 2020-03-27 | Stop reason: HOSPADM

## 2020-03-27 RX ADMIN — HYDROCODONE BITARTRATE AND ACETAMINOPHEN 0.5 TABLET: 5; 325 TABLET ORAL at 06:32

## 2020-03-27 RX ADMIN — ACETAMINOPHEN 500 MG: 500 TABLET, FILM COATED ORAL at 09:04

## 2020-03-27 RX ADMIN — SODIUM CHLORIDE, POTASSIUM CHLORIDE, SODIUM LACTATE AND CALCIUM CHLORIDE 1000 ML: 600; 310; 30; 20 INJECTION, SOLUTION INTRAVENOUS at 09:06

## 2020-03-27 ASSESSMENT — COGNITIVE AND FUNCTIONAL STATUS - GENERAL
MOBILITY SCORE: 20
MOVING FROM LYING ON BACK TO SITTING ON SIDE OF FLAT BED: A LITTLE
DRESSING REGULAR LOWER BODY CLOTHING: A LITTLE
MOVING TO AND FROM BED TO CHAIR: A LITTLE
DAILY ACTIVITIY SCORE: 21
SUGGESTED CMS G CODE MODIFIER DAILY ACTIVITY: CJ
HELP NEEDED FOR BATHING: A LITTLE
WALKING IN HOSPITAL ROOM: A LITTLE
DRESSING REGULAR UPPER BODY CLOTHING: A LITTLE
SUGGESTED CMS G CODE MODIFIER MOBILITY: CJ
STANDING UP FROM CHAIR USING ARMS: A LITTLE

## 2020-03-27 ASSESSMENT — ENCOUNTER SYMPTOMS
VOMITING: 0
CARDIOVASCULAR NEGATIVE: 1
GASTROINTESTINAL NEGATIVE: 1
COUGH: 1
CONSTITUTIONAL NEGATIVE: 1
NAUSEA: 0

## 2020-03-27 NOTE — PROGRESS NOTES
Pt given discharge instructions, MARINA drains explained, demonstrated and all questions answered. Pt explained to record output, Prevena explained to patient. All questions answered. Iv removed, alex applied for drains. Pt discharged home with no further needs.

## 2020-03-27 NOTE — OR NURSING
Notified Dr Holbrook regarding patient slow progression toward discharge with hypotension and dizziness. She gave orders for 23hr observation admission. Patient and her friend updated regarding plan of care

## 2020-03-27 NOTE — DISCHARGE SUMMARY
Discharge Summary    CHIEF COMPLAINT ON ADMISSION  Right Breast invasive ductal adenocarcinoma, ER/IN positive    Reason for Admission  BREAST CANCER, UPPER OUTER QUADRAN*     Admission Date  3/26/2020    CODE STATUS  Prior    HPI & HOSPITAL COURSE  This is a 39 y.o. female here with breast cancer and elects surgical resection. The patient post operative coarse was essentially unremarkable.  At the time of discharge she was afebrile, tolerating a regular diet and voiding normally.  Her general exam is unremarkable.  Her incisions are healing satisfactorily.  She has a Prevena and JPx2 in place.  Patient has been counseled on normal expectations of an uncomplicated post operative coarse.  She was discharged on a regular diet.  She has restarted her pre-admission medications.  Her discharge medications are as below.   She is to follow up with Dr. Holbrook in one week and prn.  Discharge instructions were given. Precautions and limitations were reviewed.     The patient was counseled regarding the benefits of narcotics for post-operative pain in the short term period. The patient was made aware of the alternatives, including heating pads, ice, and NSAID medication.    The risks of addiction, overdose, respiratory depression, and risks during pregnancy (if applicable) along with warning signs of addiction, were discussed.  We discussed taking the medications as prescribed and how they can interact with other medications the patient is currently taking.     The patient was notified not to share the medications with others and understands that these medications are intended to be used only in the short term for post-operative pain.    I reviewed the NarcRx  program, and the patient was deemed not to be at high risk for abuse, and had no concurrent prescriptions.    The patient was given a chance to ask questions, and all questions were answered. Discussion was undertaken with Layman's terms. The patient demonstrated  adequate understanding. Consent was given and signed preoperatively in clear state of mind.    The patient has stated understanding and agrees with this plan of care.                 Pathological exam:             Pending           Therefore, she is discharged in good and stable condition to home with close outpatient follow-up.      Discharge Date  3/27/2020      FOLLOW UP ITEMS POST DISCHARGE  Drain, Oncology    DISCHARGE DIAGNOSES  Breast cancer in female (HCC)- (present on admission)  Assessment & Plan  Right sided breast cancer, Invasive ductal adenocarcinoma ER/PA positive  3/26/2020 - Bilateral simple mastectomy with a right sentinel lymph node biopsy.        FOLLOW UP  Future Appointments   Date Time Provider Department Center   5/28/2020  7:00 AM Beatris Pathak M.D. [address] Annie Holbrook M.D.  [address] [facility] [address]  [address]  [facility] [address]  [phone]    On 4/1/2020        MEDICATIONS ON DISCHARGE     Medication List      CONTINUE taking these medications      Instructions   folic acid 1 MG Tabs  Commonly known as:  FOLVITE   Take 1 mg by mouth every day.  Dose:  1 mg     MULTI-VITAMIN DAILY PO   Take  by mouth every day.     VITAMIN B 12 PO   Take 1 Tab by mouth every day.  Dose:  1 Tab     vitamin D (Ergocalciferol) 1.25 MG (34512 UT) Caps capsule  Commonly known as:  DRISDOL   Take 1 Cap by mouth every 7 days.  Dose:  50,000 Units            Allergies  Allergies   Allergen Reactions   • Shellfish Allergy Hives   • Nickel Itching     rash       DIET  Orders Placed This Encounter   Procedures   • Diet Order Regular     Standing Status:   Standing     Number of Occurrences:   1     Order Specific Question:   Diet:     Answer:   Regular [1]       ACTIVITY  As tolerated.  Weight bearing as tolerated    CONSULTATIONS  None    PROCEDURES  Bilateral simple mastectomy with a right sentinel lymph node   biopsy.    LABORATORY  Lab Results   Component Value Date    SODIUM 139 07/03/2018    SODIUM  131 (L) 05/20/2012    POTASSIUM 4.0 07/03/2018    POTASSIUM 3.7 05/20/2012    CHLORIDE 101 07/03/2018    CHLORIDE 100 05/20/2012    CO2 23 07/03/2018    CO2 21 05/20/2012    GLUCOSE 87 07/03/2018    GLUCOSE 109 (H) 05/20/2012    BUN 14 07/03/2018    BUN 7 (L) 05/20/2012    CREATININE 0.77 07/03/2018    CREATININE 0.67 05/20/2012    CREATININE 0.80 03/16/2010    GLOMRATE >59 03/16/2010        Lab Results   Component Value Date    WBC 9.0 03/27/2020    WBC 4.4 03/16/2010    HEMOGLOBIN 8.8 (L) 03/27/2020    HEMATOCRIT 26.9 (L) 03/27/2020    PLATELETCT 252 03/27/2020        Total time of the discharge process exceeds 38 minutes.

## 2020-03-27 NOTE — PROGRESS NOTES
"Pt A&O x4.    Vitals: BP (!) 87/53 Comment: rn notified  Pulse 64   Temp 37 °C (98.6 °F) (Temporal)   Resp 16   Ht 1.676 m (5' 6\")   Wt 70.6 kg (155 lb 10.3 oz)   LMP 03/22/2020 (Exact Date) Comment: hcg dos  SpO2 98%   BMI 25.12 kg/m²     Denies having any pain, declines pain interventions.     Neuro: TAFOYA. Denies new onset of numbness/ tingling.    Cardiac: Denies new onset of chest pain.    Vascular: Pulses 2+ BUE, BLE. No edema noted. Hypotensive.     Respiratory: Lungs sound clear to auscultation. On RA.  on, satting 100%. Denies SOB.    GI: BM PTA. On regular diet. - nausea/ vomiting.    : Pt voiding large amounts. Urine straw/ clear.      MSK: Pt up to bathroom SBA with hand held assist, tolerating slowly.    Integumentary: Transverse chest Prevena wound vac dressing CDI, observed, no leaks noted, wound vac on, no drainage in cannister. Bilateral lateral chest MARINA drains (x2) compressed to self suction, serosanguinous drainage, gauze dressings CDI, sutured in place.    Labs noted.    Fall precautions in place: Bed locked in lowest position, Upper bed rails up, treaded socks in place, personal belongings within reach, call light within reach, appropriate mobility signs in place, - bed alarm. Pt calls appropriately.     Pt updated on POC.   "

## 2020-03-27 NOTE — CARE PLAN
Problem: Communication  Goal: The ability to communicate needs accurately and effectively will improve  Outcome: PROGRESSING AS EXPECTED  Intervention: [place] patient and significant other/support system to call light to alert staff of needs  Flowsheets (Taken 3/27/2020 0224)  Oriented to:: All of the Following : Location of Bathroom, Visiting Policy, Unit Routine, Call Light and Bedside Controls, Bedside Rail Policy, Smoking Policy, Rights and Responsibilities, Bedside Report, and Patient Education Notebook  Intervention: Reorient patient to environment as needed  Flowsheets (Taken 3/27/2020 0224)  Oriented to:: All of the Following : Location of Bathroom, Visiting Policy, Unit Routine, Call Light and Bedside Controls, Bedside Rail Policy, Smoking Policy, Rights and Responsibilities, Bedside Report, and Patient Education Notebook  Intervention: Educate patient and significant other/support system about the plan of care, procedures, treatments, medications and allow for questions  Flowsheets (Taken 3/27/2020 0224)  Pt & Family Have Been Educated on Methods Available to Report Concerns Related to Care, Treatment, Services, and Patient Safety Issues: Yes     Problem: Safety  Goal: Will remain free from falls  Outcome: PROGRESSING AS EXPECTED  Intervention: Assess risk factors for falls  Flowsheets  Taken 3/27/2020 0200  Pt Calls for Assistance: Yes  Taken 3/27/2020 0224  Fall Risk: Risk to Fall -  0 - 1 point  History of fall: 0  Mobility Status Assessment: 1-1 Healthcare Provider Required for Assistance with Ambulation & Transfer  Risk for Injury-Any positive answers results in the pt being at high risk for fall related injury: Surgery:  Thoracic or Abdominal Surgery or Lower Limb Amputation  Intervention: Implement fall precautions  Flowsheets  Taken 3/26/2020 7949  Environmental Precautions:   Treaded Slipper Socks on Patient   Personal Belongings, Wastebasket, Call Bell etc. in Easy Reach   Report Given to Other  Health Care Providers Regarding Fall Risk   Bed in Low Position   Communication Sign for Patients & Families   Mobility Assessed & Appropriate Sign Placed  Taken 3/27/2020 0224  IV Pole on Same Side of Bed as Bathroom: Yes  Bedrails: Bedrails Closest to Bathroom Down     Problem: Venous Thromboembolism (VTW)/Deep Vein Thrombosis (DVT) Prevention:  Goal: Patient will participate in Venous Thrombosis (VTE)/Deep Vein Thrombosis (DVT)Prevention Measures  Outcome: PROGRESSING AS EXPECTED  Flowsheets  Taken 3/26/2020 2255 by Clay Stephen, RSOILA  Risk Assessment Score: 2  VTE RISK: Moderate  Taken 3/27/2020 0000 by Jessica Gibbons, C.N.A.  Mechanical Prophylaxis: SCDs, Sequential Compression Device  SCDs, Sequential Compression Device: On

## 2020-03-27 NOTE — ASSESSMENT & PLAN NOTE
Right sided breast cancer, Invasive ductal adenocarcinoma ER/MI positive  3/26/2020 - Bilateral simple mastectomy with a right sentinel lymph node biopsy.

## 2020-03-27 NOTE — OR NURSING
Report given to JENAE Williamson on [facility]SU. Pt transferred to [facility] via [facility].

## 2020-03-27 NOTE — PROGRESS NOTES
Surgical Daily Progress Note    Date of Service  3/27/2020    Chief Complaint  39 y.o. female admitted 3/26/2020 with BREAST CANCER, UPPER OUTER QUADRANT OF FEMALE BREAST    Interval Events  POD #1 S/P Bilateral simple mastectomy with a right sentinel lymph node biopsy.  Did well over night.  Pain controlled.  Denies chest pain or SOB.  Prevena in place.  Will mobilize and D/C home if does well.    Review of Systems  Review of Systems   Constitutional: Negative.    Respiratory: Positive for cough.    Cardiovascular: Negative.    Gastrointestinal: Negative.  Negative for nausea and vomiting.   Genitourinary: Negative for dysuria.        Vital Signs  Temp:  [36.2 °C (97.2 °F)-37.4 °C (99.4 °F)] 37.1 °C (98.7 °F)  Pulse:  [56-81] 71  Resp:  [14-18] 16  BP: ()/(38-86) 103/61  SpO2:  [92 %-100 %] 97 %    Physical Exam  Physical Exam  Cardiovascular:      Rate and Rhythm: Normal rate and regular rhythm.      Pulses: Normal pulses.   Pulmonary:      Effort: Pulmonary effort is normal.      Breath sounds: Normal breath sounds.   Abdominal:      General: Abdomen is flat.      Palpations: Abdomen is soft.      Comments: Chest wall incision with prevena in place   Skin:     General: Skin is warm and dry.   Neurological:      Mental Status: She is alert and oriented to person, place, and time.         Laboratory  Recent Results (from the past 24 hour(s))   HCG QUALITATIVE URINE (Pregnancy)    Collection Time: 03/26/20  9:36 AM   Result Value Ref Range    Beta-Hcg Urine Negative Negative   Histology Request    Collection Time: 03/26/20  2:24 PM   Result Value Ref Range    Pathology Request Sent to Histo    CBC WITHOUT DIFFERENTIAL    Collection Time: 03/27/20  4:17 AM   Result Value Ref Range    WBC 9.0 4.8 - 10.8 K/uL    RBC 2.50 (L) 4.20 - 5.40 M/uL    Hemoglobin 8.8 (L) 12.0 - 16.0 g/dL    Hematocrit 26.9 (L) 37.0 - 47.0 %    .6 (H) 81.4 - 97.8 fL    MCH 35.2 (H) 27.0 - 33.0 pg    MCHC 32.7 (L) 33.6 - 35.0 g/dL     RDW 48.9 35.9 - 50.0 fL    Platelet Count 252 164 - 446 K/uL    MPV 9.3 9.0 - 12.9 fL       Fluids    Intake/Output Summary (Last 24 hours) at 3/27/2020 0908  Last data filed at 3/27/2020 0800  Gross per 24 hour   Intake 2489.58 ml   Output 3480 ml   Net -990.42 ml       Core Measures & Quality Metrics  Labs reviewed and Medications reviewed  Garza catheter: No Garza      DVT Prophylaxis: Not indicated at this time, ambulatory    Ulcer prophylaxis: Not indicated        DOM Score  ETOH Screening    Assessment/Plan  Breast cancer in female (HCC)- (present on admission)  Assessment & Plan  Right sided breast cancer, Invasive ductal adenocarcinoma ER/WI positive  3/26/2020 - Bilateral simple mastectomy with a right sentinel lymph node biopsy.      Discussed patient condition with RN. Dr. Holbrook  CRITICAL CARE TIME EXCLUDING PROCEDURES: 23    minutes

## 2020-03-27 NOTE — DISCHARGE PLANNING
Care Transition Team Assessment    This RN CM used chart review for this assessment. Pt is  independent with most ADLs and IADLs prior to this hospitalization. Pt has a friend and siblings who can assist her and give her support.     Information Source  Orientation : Oriented x 4  Information Given By: Patient  Who is responsible for making decisions for patient? : Patient    Readmission Evaluation  Is this a readmission?: No    Elopement Risk  Legal Hold: No  Ambulatory or Self Mobile in Wheelchair: Yes  Disoriented: No  Psychiatric Symptoms: None  History of Wandering: No  Elopement this Admit: No  Vocalizing Wanting to Leave: No  Displays Behaviors, Body Language Wanting to Leave: No-Not at Risk for Elopement  Elopement Risk: Not at Risk for Elopement    Interdisciplinary Discharge Planning  Does Admitting Nurse Feel This Could be a Complex Discharge?: No  Primary Care Physician: Beatris Amor  Lives with - Patient's Self Care Capacity: Friends  Patient or legal guardian wants to designate a caregiver (see row info): No  Support Systems: Family Member(s), Friends / Neighbors  Housing / Facility: [address]  Do You Take your Prescribed Medications Regularly: Yes  Able to Return to Previous ADL's: Yes  Mobility Issues: No  Prior Services: None  Patient Expects to be Discharged to:: (Home)  Assistance Needed: No  Durable Medical Equipment: Not Applicable    Discharge Preparedness  What is your plan after discharge?: Home with help  What are your discharge supports?: (Friend, Sibling)  Prior Functional Level: Ambulatory, Independent with Activities of Daily Living, Independent with Medication Management  Difficulity with ADLs: None  Difficulity with IADLs: None    Functional Assesment  Prior Functional Level: Ambulatory, Independent with Activities of Daily Living, Independent with Medication Management    Finances  Financial Barriers to Discharge: No  Prescription Coverage: Yes    Vision / Hearing  Impairment  Vision Impairment : Yes  Right Eye Vision: Impaired, Wears Glasses  Left Eye Vision: Impaired, Wears Glasses  Hearing Impairment : No     Advance Directive  Advance Directive?: DPOA for Health Care  Durable Power of  Name and Contact : Reddy Aria    Domestic Abuse  Have you ever been the victim of abuse or violence?: No  Physical Abuse or Sexual Abuse: No  Verbal Abuse or Emotional Abuse: No  Possible Abuse Reported to: Not Applicable    Psychological Assessment  History of Substance Abuse: None  History of Psychiatric Problems: No  Non-compliant with Treatment: No  Newly Diagnosed Illness: Yes    Discharge Risks or Barriers  Discharge risks or barriers?: No    Anticipated Discharge Information  Anticipated discharge disposition: Home  Discharge Address: [address] [place] [id number]  Discharge Contact Phone Number: [phone]

## 2020-03-27 NOTE — CARE PLAN
Problem: Communication  Goal: The ability to communicate needs accurately and effectively will improve  3/27/2020 0948 by Aislinn Carmichael R.N.  Outcome: PROGRESSING AS EXPECTED  3/27/2020 0835 by Aislinn Carmichael R.N.  Outcome: PROGRESSING AS EXPECTED     Problem: Safety  Goal: Will remain free from injury  3/27/2020 0948 by Aislinn Carmichael R.N.  Outcome: PROGRESSING AS EXPECTED  3/27/2020 0835 by Aislinn Carmichael R.N.  Outcome: PROGRESSING AS EXPECTED  Goal: Will remain free from falls  3/27/2020 0948 by Aislinn Carmichael R.N.  Outcome: PROGRESSING AS EXPECTED  3/27/2020 0835 by Aislinn Carmichael R.N.  Outcome: PROGRESSING AS EXPECTED

## 2020-03-27 NOTE — CARE PLAN
Problem: Communication  Goal: The ability to communicate needs accurately and effectively will improve  3/27/2020 1204 by Aislinn Carmichael R.N.  Outcome: MET  3/27/2020 0948 by Aislinn Carmichael R.N.  Outcome: PROGRESSING AS EXPECTED  3/27/2020 0835 by Aislinn Carmichael R.N.  Outcome: PROGRESSING AS EXPECTED     Problem: Safety  Goal: Will remain free from injury  3/27/2020 1204 by Aislinn Carmichael R.N.  Outcome: MET  3/27/2020 0948 by Aislinn Carmichael R.N.  Outcome: PROGRESSING AS EXPECTED  3/27/2020 0835 by Aislinn Carmichael R.N.  Outcome: PROGRESSING AS EXPECTED  Goal: Will remain free from falls  3/27/2020 1204 by Aislinn Carmichael R.N.  Outcome: MET  3/27/2020 0948 by Aislinn Carmichael R.N.  Outcome: PROGRESSING AS EXPECTED  3/27/2020 0835 by Aislinn Carmichael R.N.  Outcome: PROGRESSING AS EXPECTED     Problem: Infection  Goal: Will remain free from infection  Outcome: MET     Problem: Venous Thromboembolism (VTW)/Deep Vein Thrombosis (DVT) Prevention:  Goal: Patient will participate in Venous Thrombosis (VTE)/Deep Vein Thrombosis (DVT)Prevention Measures  Outcome: MET     Problem: Bowel/Gastric:  Goal: Normal bowel function is maintained or improved  Outcome: MET  Goal: Will not experience complications related to bowel motility  Outcome: MET     Problem: Knowledge Deficit  Goal: Knowledge of disease process/condition, treatment plan, diagnostic tests, and medications will improve  Outcome: MET  Goal: Knowledge of the prescribed therapeutic regimen will improve  Outcome: MET     Problem: Discharge Barriers/Planning  Goal: Patient's continuum of care needs will be met  Outcome: MET     Problem: Pain Management  Goal: Pain level will decrease to patient's comfort goal  Outcome: MET

## 2020-03-27 NOTE — DISCHARGE INSTRUCTIONS
ACTIVITY: Rest and take it easy for the first 24 hours.  A responsible adult is recommended to remain with you during that time.  It is normal to feel sleepy.  We encourage you to not do anything that requires balance, judgment or coordination.    MILD FLU-LIKE SYMPTOMS ARE NORMAL. YOU MAY EXPERIENCE GENERALIZED MUSCLE ACHES, THROAT IRRITATION, HEADACHE AND/OR SOME NAUSEA.    FOR 24 HOURS DO NOT:  Drive, operate machinery or run household appliances.  Drink beer or alcoholic beverages.   Make important decisions or sign legal documents.    SPECIAL INSTRUCTIONS: Mastectomy: Instructions After Surgery    Pain Management  People experience different types and amount of pain or discomfort after surgery. The goal of pain management is to assess your own level of discomfort and to take medication as needed. You will have better results controlling your pain if you take pain medication before your pain is severe.   You will be given a prescription for a narcotic for the management of moderate pain. It is recommended to take medication for pain when pain is experienced on a regular schedule. Ibuprofen (Advil or Motrin) or Tylenol can be added to or replace the narcotic medication.     Everyone is different and if one plan to decrease your pain is not working, it will be changed. Healing and recovery improve with good pain control.   Please notify us of any drug allergies, reactions or medical problems that would prevent you from taking these drugs. Narcotics should not be taken with alcoholic drinks. Do not use narcotics while driving.   Narcotics also can cause or worsen constipation, so increase your fluid intake, eat high fiber foods - such as prunes and bran - and make sure that you get up and out of bed to take small walks.   An icepack may be helpful to decrease discomfort and swelling, particularly to the armpit after a lymph node dissection. A small pillow positioned in the armpit also may decrease discomfort.  "  Although you will not have felt it at the time, nor remember it afterwards, you will have had a tube down your throat during the surgery. This can often cause a sore throat for a few days following your surgery.    Incision and Dressing Care  Your incision, or scar, has both stitches and steri-strips, which are small white strips of tape, and is covered by a gauze dressing and tape or a plastic dressing.  Do not remove the dressing, steri-strips or stitches. We will remove the dressing in seven to 10 days. We also will remove the sutures in one to two weeks unless they absorb on their own. If the dressing or steri-strips fall off, do not attempt to replace them.   You may shower one day after the drain(s) is out and if you have a plastic dressing.   If you have gauze and paper tape, you may remove it two days after surgery and shower after that. Use a towel to dry your incision thoroughly after showering. Be careful not to touch or remove the steri-strips or sutures.   Bruising and some swelling are common in women after surgery.   A low-grade fever that is under 100 degrees Fahrenheit is normal the day after surgery.   You will have a Khoa-Ferguson (MARINA) drain after your surgery. This drain is a plastic tube from under the skin to outside your body with a bulb attached to it. Empty the drain two to three times per day or when the bulb is full. Write down the amount drained on a sheet of paper. Your nurse will teach you how to empty your drain. An information sheet on MARINA drains is included in your binder.   A home care nurse may be assigned to check your progress at home.    Activity  Avoid strenuous activity, heavy lifting and vigorous exercise until the stitches are removed. Tell your caregiver what you do and he or she will help you make a personal plan for \"what you can do when\" after surgery.   Walking is a normal activity that can be restarted right away.   You cannot do housework or driving until the drain " is out. You may restart driving when you are no longer on narcotics and you feel safe turning the wheel and stopping quickly.   Following a lymph node dissection, don't avoid using your arm, but don't exercise it until your first post-operative visit.   You will be given exercises to regain movement and flexibility. You may be referred to physical therapy for additional rehabilitation if it is needed.   Most people return to work within three to six weeks. Return to work varies with your type of work, your overall health and personal preferences. Discuss returning to work with us.    Diet  You may resume your regular diet as soon as you can take fluids after recovering from anesthesia.   We encourage eight to 10 glasses of water and non-caffeinated beverages per day, plenty of fruits and vegetables as well as lower fat foods. Talk with us about recommendations for healthy eating.     Follow-Up Care  The pathology results from your surgery should be available within one week after your surgery.   We will contact you by telephone with the results or will inform you at your post-operative visit. Please let us know the telephone number where you may be reached with the results.   Your dressing will be changed or removed at your post-operative visit.      When to Contact Us  Contact us with any questions. Call [phone] and ask to speak with a nurse during the day, or the answering service in the evening to reach your doctor or the doctor on call.  Pain that is not relieved by medication   Fever more than 102 degrees Fahrenheit or chills   Excessive bleeding, such as a bloody dressing   Excessive swelling   Redness outside the dressing   Discharge or bad odor from the wound   Allergic or other reactions to medication(s)   Constipation   Anxiety, depression, trouble sleeping, need more support      DIET: To avoid nausea, slowly advance diet as tolerated, avoiding spicy or greasy foods for the first day.  Add more  substantial food to your diet according to your physician's instructions.  Babies can be fed formula or breast milk as soon as they are hungry.  INCREASE FLUIDS AND FIBER TO AVOID CONSTIPATION.    SURGICAL DRESSING/BATHING: See Above    FOLLOW-UP APPOINTMENT:  A follow-up appointment should be arranged with your doctor in 1-2 weeks; call to schedule.    You should CALL YOUR PHYSICIAN if you develop:  Fever greater than 101 degrees F.  Pain not relieved by medication, or persistent nausea or vomiting.  Excessive bleeding (blood soaking through dressing) or unexpected drainage from the wound.  Extreme redness or swelling around the incision site, drainage of pus or foul smelling drainage.  Inability to urinate or empty your bladder within 8 hours.  Problems with breathing or chest pain.    You should call 911 if you develop problems with breathing or chest pain.  If you are unable to contact your doctor or surgical center, you should go to the nearest emergency room or urgent care center.  Physician's telephone #: [phone]    If any questions arise, call your doctor.  If your doctor is not available, please feel free to call the Surgical Center at [phone].  The Center is open Monday through Friday from 7AM to 7PM.  You can also call the [employer] HOTLINE open 24 hours/day, 7 days/week and speak to a nurse at [phone], or toll free at [phone].    A registered nurse may call you a few days after your surgery to see how you are doing after your procedure.    MEDICATIONS: Resume taking daily medication.  Take prescribed pain medication with food.  If no medication is prescribed, you may take non-aspirin pain medication if needed.  PAIN MEDICATION CAN BE VERY CONSTIPATING.  Take a stool softener or laxative such as senokot, pericolace, or milk of magnesia if needed.    Prescription given for        .  Last pain medication given at 1:35 PM, may take next dose around 5:53 PM.    If your physician has  prescribed pain medication that includes Acetaminophen (Tylenol), do not take additional Acetaminophen (Tylenol) while taking the prescribed medication.    Discharge Instructions    Discharged to home by car with relative. Discharged via wheelchair, hospital escort: Yes.  Special equipment needed: Wound VAC    Be sure to schedule a follow-up appointment with your primary care doctor or any specialists as instructed.     Discharge Plan:   Diet Plan: Discussed  Activity Level: Discussed  Confirmed Follow up Appointment: Patient to Call and Schedule Appointment  Confirmed Symptoms Management: Discussed  Medication Reconciliation Updated: Yes  Influenza Vaccine Indication: Not indicated: Previously immunized this influenza season and > 8 years of age    I understand that a diet low in cholesterol, fat, and sodium is recommended for good health. Unless I have been given specific instructions below for another diet, I accept this instruction as my diet prescription.   Other diet: Regular as tolerated    Vacuum-Assisted Closure Therapy  Vacuum-assisted closure (VAC) therapy uses a device that removes fluid and germs from wounds to help them heal. It is used on wounds that cannot be closed with stitches. They often heal slowly. Vacuum-assisted therapy helps the wound stay clean and healthy while the open wound slowly grows back together.  Vacuum-assisted closure therapy uses a bandage (dressing) that is made of foam. It is put inside the wound. Then, a drape is placed over the wound. This drape sticks to your skin to keep air out, and to protect the wound. A tube is hooked up to a small pump and is attached to the drape. The pump sucks out the fluid and germs. Vacuum-assisted closure therapy can also help reduce the bad smell that comes from the wound.  HOW DOES IT WORK?   The vacuum pump pulls fluid through the foam dressing. The dressing may wrinkle during this process. The fluid goes into the tube and away from the  wound. The fluid then goes into a container. The fluid in the container must be replaced if it is full or at least once a week, even if the container is not full. The pulling from the pump helps to close the wound and bring better circulation to the wound area.  The foam dressing covers and protects the wound. It helps your wound heal faster.   HOW DOES IT FEEL?   · You might feel a little pulling when the pump is on.  · You might also feel a mild vibrating sensation.    · You might feel some discomfort when the dressing is taken off.  CAN I MOVE AROUND WITH VACUUM-ASSISTED CLOSURE THERAPY?  Yes, it has a backup battery which is used when the machine is not plugged in, as long as the battery is working, you can move freely.  WHAT ARE SOME THINGS I MUST KNOW?  · Do not turn off the pump yourself, unless instructed to do so by your healthcare provider, such as for bathing.  · Do not take off the dressing yourself, unless instructed to do so by your caregiver.  · You can wash or shower with the dressing. However, do not take the pump into the shower. Make sure the wound dressing is protected and covered with plastic. The wound area must stay dry.  · Do not turn off the pump for more than 2 hours. If the pump is off for more than 2 hours, your nurse must change your dressing.  · Check frequently that the machine is on, that the machine indicates the therapy is on, and that all clamps are open.  THE ALARM IS SOUNDING! WHAT SHOULD I DO?   · Stay calm.  · Do not turn off the pump or do anything with the dressing.  · Call your clinic or caregiver right away if the alarm goes off and you cannot fix the problem. Some reasons the alarm might go off include:  ¨ The fluid collection container is full.  ¨ The battery is low.  ¨ The dressing has a leak.  · Explain to your caregiver what is happening. Follow the instructions you receive.  WHEN SHOULD I CALL FOR HELP?   · You have severe pain.  · You have difficulty  breathing.  · You have bleeding that will not stop.  · Your wound smells bad.  · You have redness, swelling, or fluid leaking from your wound.  · Your alarm goes off and you do not know what to do.  · You have a fever.  · Your wound itches severely.  · Your dressing changes are often painful or bleeding often occurs.  · You have diarrhea.  · You have a sore throat.  · You have a rash around the dressing or anywhere else on your body.  · You feel nauseous.  · You feel dizzy or weak.  · The VAC machine has been off for more than 2 hours.  HOW DO I GET READY TO GO HOME WITH A PUMP?   A trained caregiver will talk to you and answer your questions about your vacuum-assisted closure therapy before you go home. He or she will explain what to expect. A caregiver will come to your home to apply the pump and care for your wound.  The at-home caregiver will be available for questions and will come back for the scheduled dressing changes, usually every 48-72 hours (or more often for severely infected wounds). Your at-home caregiver will also come if you are having an unexpected problem. If you have questions or do not know what to do when you go home, talk to your healthcare provider.  This information is not intended to replace advice given to you by your health care provider. Make sure you discuss any questions you have with your health care provider.  Document Released: 11/30/2009 Document Revised: 08/20/2014 Document Reviewed: 09/22/2016  [employer] Interactive Patient Education © 2017 [employer]  Bulb Drain Home Care  A bulb drain consists of a thin rubber tube and a soft, round bulb that creates a gentle suction. The rubber tube is placed in the area where you had surgery. A bulb is attached to the end of the tube that is outside the body. The bulb drain removes excess fluid that normally builds up in a surgical wound after surgery. The color and amount of fluid will vary. Immediately after surgery, the fluid is bright  red and is a little thicker than water. It may gradually change to a yellow or pink color and become more thin and water-like. When the amount decreases to about 1 or 2 tbsp in 24 hours, your health care provider will usually remove it.  DAILY CARE  · Keep the bulb flat (compressed) at all times, except while emptying it. The flatness creates suction. You can flatten the bulb by squeezing it firmly in the middle and then closing the cap.  · Keep sites where the tube enters the skin dry and covered with a bandage (dressing).  · Secure the tube 1-2 in (2.5-5.1 cm) below the insertion sites to keep it from pulling on your stitches. The tube is stitched in place and will not slip out.  · Secure the bulb as directed by your health care provider.  · For the first 3 days after surgery, there usually is more fluid in the bulb. Empty the bulb whenever it becomes half full because the bulb does not create enough suction if it is too full. The bulb could also overflow. Write down how much fluid you remove each time you empty your drain. Add up the amount removed in 24 hours.  · Empty the bulb at the same time every day once the amount of fluid decreases and you only need to empty it once a day. Write down the amounts and the 24-hour totals to give to your health care provider. This helps your health care provider know when the tubes can be removed.  EMPTYING THE BULB DRAIN  Before emptying the bulb, get a measuring cup, a piece of paper and a pen, and wash your hands.  · Gently run your fingers down the tube (stripping) to empty any drainage from the tubing into the bulb. This may need to be done several times a day to clear the tubing of clots and tissue.  · Open the bulb cap to release suction, which causes it to inflate. Do not touch the inside of the cap.  · Gently run your fingers down the tube (stripping) to empty any drainage from the tubing into the bulb.  · Hold the cap out of the way, and pour fluid into the  measuring cup.    · Squeeze the bulb to provide suction.   · Replace the cap.    · Check the tape that holds the tube to your skin. If it is becoming loose, you can remove the loose piece of tape and apply a new one. Then, pin the bulb to your shirt.    · Write down the amount of fluid you emptied out. Write down the date and each time you emptied your bulb drain. (If there are 2 bulbs, note the amount of drainage from each bulb and keep the totals separate. Your health care provider will want to know the total amounts for each drain and which tube is draining more.)    · Flush the fluid down the toilet and wash your hands.    · Call your health care provider once you have less than 2 tbsp of fluid collecting in the bulb drain every 24 hours.  If there is drainage around the tube site, change dressings and keep the area dry. Cleanse around tube with sterile saline and place dry gauze around site. This gauze should be changed when it is soiled. If it stays clean and unsoiled, it should still be changed daily.   SEEK MEDICAL CARE IF:  · Your drainage has a bad smell or is cloudy.    · You have a fever.    · Your drainage is increasing instead of decreasing.    · Your tube fell out.    · You have redness or swelling around the tube site.    · You have drainage from a surgical wound.    · Your bulb drain will not stay flat after you empty it.    MAKE SURE YOU:   · Understand these instructions.  · Will watch your condition.  · Will get help right away if you are not doing well or get worse.     This information is not intended to replace advice given to you by your health care provider. Make sure you discuss any questions you have with your health care provider.     Document Released: 12/15/2001 Document Revised: 01/08/2016 Document Reviewed: 05/22/2013  [employer] Interactive Patient Education ©2016 [employer]      Special Instructions: None    · Is patient discharged on Warfarin / Coumadin?   No     Depression /  Suicide Risk    As you are discharged from this [facility] facility, it is important to learn how to keep safe from harming yourself.    Recognize the warning signs:  · Abrupt changes in personality, positive or negative- including increase in energy   · Giving away possessions  · Change in eating patterns- significant weight changes-  positive or negative  · Change in sleeping patterns- unable to sleep or sleeping all the time   · Unwillingness or inability to communicate  · Depression  · Unusual sadness, discouragement and loneliness  · Talk of wanting to die  · Neglect of personal appearance   · Rebelliousness- reckless behavior  · Withdrawal from people/activities they love  · Confusion- inability to concentrate     If you or a loved one observes any of these behaviors or has concerns about self-harm, here's what you can do:  · Talk about it- your feelings and reasons for harming yourself  · Remove any means that you might use to hurt yourself (examples: pills, rope, extension cords, firearm)  · Get professional help from the community (Mental Health, Substance Abuse, psychological counseling)  · Do not be alone:Call your Safe Contact- someone whom you trust who will be there for you.  · Call your local CRISIS HOTLINE 041-1941 or 890-711-0182  · Call your local Children's Mobile Crisis Response Team [place] (172) 221-2628 or www.[employer]  · Call the toll free National Suicide Prevention Hotlines   · National Suicide Prevention Lifeline 624-484-HFVL (3116)  · National Hope Line Network 800-SUICIDE (813-7416)        Depression / Suicide Risk    As you are discharged from this [facility] facility, it is important to learn how to keep safe from harming yourself.    Recognize the warning signs:  · Abrupt changes in personality, positive or negative- including increase in energy   · Giving away possessions  · Change in eating patterns- significant weight changes-  positive or negative  · Change in  sleeping patterns- unable to sleep or sleeping all the time   · Unwillingness or inability to communicate  · Depression  · Unusual sadness, discouragement and loneliness  · Talk of wanting to die  · Neglect of personal appearance   · Rebelliousness- reckless behavior  · Withdrawal from people/activities they love  · Confusion- inability to concentrate     If you or a loved one observes any of these behaviors or has concerns about self-harm, here's what you can do:  · Talk about it- your feelings and reasons for harming yourself  · Remove any means that you might use to hurt yourself (examples: pills, rope, extension cords, firearm)  · Get professional help from the community (Mental Health, Substance Abuse, psychological counseling)  · Do not be alone:Call your Safe Contact- someone whom you trust who will be there for you.  · Call your local CRISIS HOTLINE 618-4338 or 144-219-7465  · Call your local Children's Mobile Crisis Response Team [place] (083) 081-4099 or www.[employer]  · Call the toll free National Suicide Prevention Hotlines   · National Suicide Prevention Lifeline 437-681-GXTZ (4290)  · National Hope Line Network 800-SUICIDE (311-3037)  ·

## 2020-03-27 NOTE — DISCHARGE PLANNING
This RN CM spoke with Pt today . Per Pt she is discharging this morning and her friend Ghulam can provide transport to home.   Per Pt. She has no other needs for now.

## 2020-04-07 ENCOUNTER — PATIENT MESSAGE (OUTPATIENT)
Dept: HEALTH INFORMATION MANAGEMENT | Facility: OTHER | Age: 40
End: 2020-04-07

## 2020-04-07 ENCOUNTER — HOSPITAL ENCOUNTER (OUTPATIENT)
Dept: RADIATION ONCOLOGY | Facility: MEDICAL CENTER | Age: 40
End: 2020-04-30
Attending: RADIOLOGY
Payer: COMMERCIAL

## 2020-04-07 VITALS
TEMPERATURE: 98.9 F | HEIGHT: 67 IN | WEIGHT: 148.5 LBS | SYSTOLIC BLOOD PRESSURE: 117 MMHG | BODY MASS INDEX: 23.31 KG/M2 | DIASTOLIC BLOOD PRESSURE: 79 MMHG | HEART RATE: 66 BPM | OXYGEN SATURATION: 99 %

## 2020-04-07 PROCEDURE — 99214 OFFICE O/P EST MOD 30 MIN: CPT | Performed by: RADIOLOGY

## 2020-04-07 PROCEDURE — 99205 OFFICE O/P NEW HI 60 MIN: CPT | Performed by: RADIOLOGY

## 2020-04-07 RX ORDER — TRAMADOL HYDROCHLORIDE 50 MG/1
TABLET ORAL
COMMUNITY
Start: 2020-04-03 | End: 2020-11-30

## 2020-04-07 SDOH — HEALTH STABILITY: MENTAL HEALTH: HOW OFTEN DO YOU HAVE A DRINK CONTAINING ALCOHOL?: NEVER

## 2020-04-07 ASSESSMENT — PAIN SCALES - GENERAL: PAINLEVEL: 5=MODERATE PAIN

## 2020-04-07 ASSESSMENT — FIBROSIS 4 INDEX: FIB4 SCORE: 0.8

## 2020-04-07 NOTE — CONSULTS
RADIATION ONCOLOGY CONSULT    DATE OF SERVICE: 4/7/2020    IDENTIFICATION: A 39 y.o. female with  A stage IB pT3pNo grade 2 ER MI positive HER-2 negative breast cancer status post mastectomy with negative margins and negative lymph nodes.  She is here at the kind request of Dr. Kat Holbrook.      HISTORY OF PRESENT ILLNESS: Patient's history dates back to 9/13/2019 when she underwent a screening mammogram with the mamma Van.  She was found to have numerous microcalcifications in the upper right breast they recommended obtaining prior studies but if none can be obtained spot magnification for further evaluation.  She then underwent a diagnostic mammogram and ultrasound on 1/2/2020.  This showed microcalcifications throughout the superior central right breast mammographically suspicious for at least DCIS biopsy was recommended.  There were also indeterminate appearing circumscribed mass at the 9 o'clock position in the left breast better seen mammographically due to the depth of the finding and the echotexture of the surrounding breast tissue biopsy recommended.  On ultrasound in the right breast at the 12:30 position there was an inhomogeneous echotexture with irregular margins surrounding numerous microcalcifications at the 12:30 position measuring 5.9 x 2.2 x 3.8 cm, better seen mammographically no axillary adenopathy was appreciated.  The left breast ultrasound showed a 1.6 x 1.2 x 2.1 oval circumscribed mass at the 9 o'clock position.  Biopsy is recommended of both lesions.  On 1/6/2020 stereotactic biopsies were obtained the right breast superior central region revealed an invasive ductal adenocarcinoma grade 3 ER MI positive at 90% with a Ki-67 of 10 to 15%.  HER-2/dennys was IHC equivocal FISH negative.  The left breast stereotactic biopsy showed a benign fibroadenoma and benign fibroglandular fibroadipose breast tissue with focal areas of adenosis no evidence of malignancy.  Patient elected to undergo a right  "mastectomy axillary node dissection and left simple mastectomy on 3/26/2020.  The right breast revealed a 7 x 4.5 x 3 cm tumor with associated intermediate grade ductal carcinoma in situ.  All surgical margins were uninvolved.  Closest margin was at least 2 mm for all DCIS and distance from closest margin on the invasive ductal carcinoma was 3 mm from the superficial/anterior margin.  Lymph vascular invasion was present.  The right axillary lymph nodes were negative, 7 were taken out.  Left breast showed a fibroadenoma and no evidence of in situ or invasive disease.    PAST MEDICAL HISTORY:   Past Medical History:   Diagnosis Date   • Allergic rhinitis 4/7/2011   • Cancer (HCC) 2020    breast right   • Headache(784.0)     When in school, not a problem now.  With sleep deprivation.   • Pernicious anemia    • Snoring    • Urinary incontinence     minor       PAST SURGICAL HISTORY:  Past Surgical History:   Procedure Laterality Date   • PB MASTECTOMY, SIMPLE, COMPLETE Bilateral 3/26/2020    Procedure: MASTECTOMY;  Surgeon: Sophy Holbrook M.D.;  Location: SURGERY [facility];  Service: General   • NODE BIOPSY SENTINEL Right 3/26/2020    Procedure: BIOPSY, LYMPH NODE, SENTINEL;  Surgeon: Sophy Holbrook M.D.;  Location: SURGERY [facility];  Service: General   • BREAST BIOPSY Right 01/06/2020    Rt Breast Bx   • VAGINAL PERINEAL EXAM REPAIR  5/14/2012    Performed by HUSSAIN RANGEL at LABOR AND DELIVERY   • LUMPECTOMY  2001    \"[facility]   • OTHER Left 1981    laceration repair  left leg       GYNECOLOGICAL STATUS:  G1, P1 last menstrual period 3/24/2020.  Birth control pills for 3 years    CURRENT MEDICATIONS:  Current Outpatient Medications   Medication Sig Dispense Refill   • tramadol (ULTRAM) 50 MG Tab TAKE 1 TABLET BY MOUTH EVERY 6 HOURS AS NEEDED FOR 5 DAYS     • Acetaminophen (TYLENOL 8 HOUR PO) Take  by mouth.     • Multiple Vitamin (MULTI-VITAMIN DAILY PO) Take  by mouth every day.     • " "vitamin D, Ergocalciferol, (DRISDOL) 20920 units Cap capsule Take 1 Cap by mouth every 7 days. 12 Cap 1   • Cyanocobalamin (VITAMIN B 12 PO) Take 1 Tab by mouth every day.     • folic acid (FOLVITE) 1 MG TABS Take 1 mg by mouth every day.       No current facility-administered medications for this encounter.        ALLERGIES:    Shellfish allergy and Nickel    FAMILY HISTORY:    Family History   Problem Relation Age of Onset   • Hypertension Mother    • Thyroid Mother         Hypothyroid   • Hyperlipidemia Father    • Cancer Father         T cell lymphoma   • Heart Disease Maternal Grandfather         MI 38 yo, unknown RF.   • Cancer Paternal Uncle         lymphoma   [unfilled]        SOCIAL HISTORY:     reports that she has never smoked. She has never used smokeless tobacco. She reports that she does not drink alcohol or use drugs.  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 It is in the nursing note dated 4/7/2020 in [facility]    Pain is a 5 out of 10 from the surgery.  Her distress score is 3    PHYSICAL EXAM:    0= Fully active, able to carry on all pre-disease performance without restriction.  Vitals:    04/07/20 1248   BP: 117/79   BP Location: Left arm   Patient Position: Sitting   BP Cuff Size: Adult   Pulse: 66   Temp: 37.2 °C (98.9 °F)   TempSrc: Temporal   SpO2: 99%   Weight: 67.4 kg (148 lb 8 oz)   Height: 1.689 m (5' 6.5\")            GENERAL: Well-appearing alert and oriented x3 in no apparent distress  Chest wall: Bilateral mastectomies present no sign of infection.  There is no nodularity or mass on the chest walls or axilla  HEENT:  Pupils are " equal, round, and reactive to light.  Extraocular muscles   are intact. Sclerae nonicteric.  Conjunctivae pink.  Oral cavity, tongue   protrudes midline.   NECK:   No peripheral adenopathy of the neck, supraclavicular fossa or axillae   bilaterally.  LUNGS:  Clear to ascultation   HEART:  Regular rate and rhythm.  No murmur appreciated  ABDOMEN:  Soft. No evidence of hepatosplenomegaly.    EXTREMITIES:  Without Edema.  NEUROLOGIC:  Cranial nerves II through XII were intact. Normal stance and gait motor and sensory grossly within normal limits                IMPRESSION:    A 39 y.o. with  A stage IB pT3pNo grade 2 ER GA positive HER-2 negative breast cancer status post mastectomy with negative margins and negative lymph nodes.  There was lymphatic vessel invasion.      RECOMMENDATIONS:   I am recommending that the patient not receive postmastectomy radiation therapy because she has widely negative margins and the only major risk factor is the young age and lymphatic vessel invasion.  I've described the details of radiation along with the side effects both acute and chronic, including but not exclusive to fatigue, skin reaction, local soreness, swelling, delayed healing, scarring fibrosis discoloration. Ample time was allowed for questions, and patient understands.    I did discuss with the patient if for some reason the Oncotype comes back high we may want to consider readdressing radiation therapy to the chest wall however at this point I do not recommend it.  She will continue her follow-up based on national cancer guidelines with Dr. Erin Hahn.    Thank you for the opportunity to participate in her care.  If any questions or comments, please do not hesitate in calling.    Please note that this dictation was created using voice recognition software. I have made every reasonable attempt to correct obvious errors, but I expect that there are errors of grammar and possibly content that I did not discover before  finalizing the note.

## 2020-04-07 NOTE — NON-PROVIDER
"Patient was seen today in clinic with Dr. Ortiz for consult.  Vitals signs and weight were obtained and pain assessment was completed.  Allergies and medications were reviewed with the patient.  Review of systems completed.     Vitals/Pain:  Vitals:    04/07/20 1248   BP: 117/79   BP Location: Left arm   Patient Position: Sitting   BP Cuff Size: Adult   Pulse: 66   Temp: 37.2 °C (98.9 °F)   TempSrc: Temporal   SpO2: 99%   Weight: 67.4 kg (148 lb 8 oz)   Height: 1.689 m (5' 6.5\")   Pain Score: 5=Moderate Pain        Allergies:   Shellfish allergy and Nickel    Current Medications:  Current Outpatient Medications   Medication Sig Dispense Refill   • tramadol (ULTRAM) 50 MG Tab TAKE 1 TABLET BY MOUTH EVERY 6 HOURS AS NEEDED FOR 5 DAYS     • Acetaminophen (TYLENOL 8 HOUR PO) Take  by mouth.     • Multiple Vitamin (MULTI-VITAMIN DAILY PO) Take  by mouth every day.     • vitamin D, Ergocalciferol, (DRISDOL) 67293 units Cap capsule Take 1 Cap by mouth every 7 days. 12 Cap 1   • Cyanocobalamin (VITAMIN B 12 PO) Take 1 Tab by mouth every day.     • folic acid (FOLVITE) 1 MG TABS Take 1 mg by mouth every day.       No current facility-administered medications for this encounter.          PCP:  Lawson Martinez, Med Ass't    "

## 2020-05-27 PROBLEM — G89.18 PAIN FOLLOWING SURGERY OR PROCEDURE: Status: RESOLVED | Noted: 2020-03-27 | Resolved: 2020-05-27

## 2020-05-27 PROBLEM — C50.311 MALIGNANT NEOPLASM OF LOWER-INNER QUADRANT OF RIGHT FEMALE BREAST (HCC): Status: ACTIVE | Noted: 2020-03-27

## 2020-05-28 ENCOUNTER — TELEMEDICINE (OUTPATIENT)
Dept: MEDICAL GROUP | Age: 40
End: 2020-05-28
Payer: COMMERCIAL

## 2020-05-28 VITALS — HEIGHT: 66 IN | WEIGHT: 152.5 LBS | BODY MASS INDEX: 24.51 KG/M2

## 2020-05-28 DIAGNOSIS — E55.9 HYPOVITAMINOSIS D: ICD-10-CM

## 2020-05-28 DIAGNOSIS — D75.89 MACROCYTOSIS: ICD-10-CM

## 2020-05-28 DIAGNOSIS — D51.0 PERNICIOUS ANEMIA: ICD-10-CM

## 2020-05-28 DIAGNOSIS — Z17.0 MALIGNANT NEOPLASM OF LOWER-INNER QUADRANT OF RIGHT BREAST OF FEMALE, ESTROGEN RECEPTOR POSITIVE (HCC): ICD-10-CM

## 2020-05-28 DIAGNOSIS — C50.311 MALIGNANT NEOPLASM OF LOWER-INNER QUADRANT OF RIGHT BREAST OF FEMALE, ESTROGEN RECEPTOR POSITIVE (HCC): ICD-10-CM

## 2020-05-28 DIAGNOSIS — Z00.00 HEALTH CARE MAINTENANCE: ICD-10-CM

## 2020-05-28 PROCEDURE — 99214 OFFICE O/P EST MOD 30 MIN: CPT | Mod: 95,CR | Performed by: INTERNAL MEDICINE

## 2020-05-28 ASSESSMENT — ANXIETY QUESTIONNAIRES
GAD7 TOTAL SCORE: 1
3. WORRYING TOO MUCH ABOUT DIFFERENT THINGS: NOT AT ALL
6. BECOMING EASILY ANNOYED OR IRRITABLE: SEVERAL DAYS
7. FEELING AFRAID AS IF SOMETHING AWFUL MIGHT HAPPEN: NOT AT ALL
1. FEELING NERVOUS, ANXIOUS, OR ON EDGE: NOT AT ALL
5. BEING SO RESTLESS THAT IT IS HARD TO SIT STILL: NOT AT ALL
4. TROUBLE RELAXING: NOT AT ALL
2. NOT BEING ABLE TO STOP OR CONTROL WORRYING: NOT AT ALL

## 2020-05-28 ASSESSMENT — FIBROSIS 4 INDEX: FIB4 SCORE: 0.82

## 2020-05-28 ASSESSMENT — PATIENT HEALTH QUESTIONNAIRE - PHQ9: CLINICAL INTERPRETATION OF PHQ2 SCORE: 0

## 2020-05-28 NOTE — PROGRESS NOTES
Telemedicine Visit: Established Patient     This Remote Face to Face encounter was conducted via Zoom. Given the importance of social distancing and other strategies recommended to reduce the risk of COVID-19 transmission, I am providing medical care to this patient via audio/video visit in place of an in person visit at the request of the patient. Verbal consent to telehealth, risks, benefits, and consequences were discussed. Patient retains the right to withdraw at any time. All existing confidentiality protections apply. The patient has access to all transmitted medical information. No dissemination of any patient images or information to other entities without further written consent.    CHIEF COMPLAINT   Conditions anemia, labs    HPI  Tiarra Chavez is a 40 y.o. female who presents today for the following     Pernicious anemia, macrocytosis, fatigue  Dg: in 2015     Denies:  - Anorexia, sweating, pallor  - Extremity numbness or tingling  Rx: B12 injections Q 7 days                      FH: neg  Reviewed labs.       Hypovitaminosis D  The patient had low vitamin D level.  Vitamin D supplement:  Was on high-dose vitamin D.     Right breast invasive ductal adenocarcinoma, ER/HI positive  41 y/o, F  Primary dg: Breast cancer stage 1B  · TNM pT3, PN0, cM0, [place] ID G2  · Er/Pr (+)  · HER 2 protein overexpression: equivocal; HER2 Gene amplification: (-)  · BRCA (-)    Stage 1B right breast invasive ductal carcinoma  3/26/2020:   - S/p bilateral mastectomy and lymph node dissection on the right side   - showed 7 multiply 4.5 mL replied 3 cm, grade 2, margins uninvolved   - distance from closest margin 3 mm from the superficial throat/anterior margin  -7 lymph nodes benign; lymphovascular invasion present  -ER 90%, HI 90%, Ki 67 10-15%.    PET scan, 2/24/2020:  Patchy uptake in the sternum, most in the manubrio-sternal junction/sternal angle. No change is seen on correlate CT. This is indeterminant and follow-up  is recommended.    CT chest, abdomen, pelvis, 2/12/20   1. Nonspecific 1.1 cm low-attenuation mass in the left hepatic lobe could be cysts or hemangioma. Metastatic disease cannot be entirely excluded. Further evaluation with MRI recommended.  2. No suspicious pulmonary nodule.    MRI abdomen, 3/13/20:  1.1 cm lesion in the LT lobe consistent with hemangioma    Treatment:  - St post radiotherapy.  - Ongoing chemotherapy, 4 cycles every 3 weeks.  - Tamoxifen  - F/u by oncology Dr Hahn.    Dg:  bx, 1/6//2020 -pathology  A. Right breast:          Invasive ductal carcinoma, grade 2.          Tumor measures 7.0 x 4.5 x 3.0 cm.          Intermediate grade ductal carcinoma in situ (DCIS), solid type           with comedo-type necrosis, separate and admixed with invasive           tumor.          All surgical margins of resection, uninvolved by in situ and           invasive carcinoma.          Skin and nipple without histopathologic abnormality, uninvolved           by tumor.     B. Right axillary lymph node:          Four lymph nodes, negative for metastatic carcinoma (0/4).   C. Right axillary tissue:          Three lymph nodes, negative for metastatic carcinoma (0/3).   D. Left breast:          Fibroglandular breast tissue demonstrating a hyalinized           fibroadenoma (1.5 cm) with biopsy site changes, focus of           atypical lobular hyperplasia (ALH) and fibrocystic change           including apocrine metaplasia and adenosis.          Skin and nipple without histopathologic abnormality.          No in situ or invasive carcinoma identified.     Synoptic Report for Invasive Carcinoma of the Breast:          -Procedure: Total mastectomy        -Specimen Laterality: Right        -Tumor Site: Upper inner to upper outer quadrants        -Tumor Size: 7.0 x 4.5 x 3.0 cm        -Histologic Type: Invasive ductal carcinoma        -Histologic Grade (Star Histologic Score):         Glandular (Acinar)/Tubular  Differentiation: Score 3         Nuclear Pleomorphism: Score 2         Mitotic Rate: Score 2         Overall Grade: 2        -Tumor Focality: Single focus        -Ductal Carcinoma In Situ (DCIS): Present, intermediate grade,         solid type with comedo-type necrosis admixed with invasive         component and separate focus in the retroareolar area        -Lobular Carcinoma In Situ (LCIS): No LCIS in specimen        -Tumor Extension: Not applicable        -Margins:         Invasive Carcinoma Margins: Uninvolved by invasive carcinoma          Distance from closest margin: 3 mm from the superficial/anterior         margin         DCIS Margins: Uninvolved by DCIS          Distance from closest margin: Cannot be determined but is at         least greater than 2 mm from all margins        -Regional Lymph Nodes: All nodes negative         Total number of lymph nodes examined: 7         Number of sentinel lymph node: 4        -Treatment Effect: No known presurgical therapy        -Lymph-Vascular Invasion: Present        -Pathologic Staging:         Primary Tumor: pT3         Regional Lymph Nodes: pN0(sn)         Distant Metastasis: Not applicable        -Additional Pathologic Findings: Fibrocystic change, fibroadenoma         and focal ALH (right breast)        -Ancillary Studies: Performed on previous core biopsy, case         [id number] from 01/06/2020:         Estrogen Receptor (ER): Positive, moderate to strong, 90%         Progesterone Receptor (PgR): Positive, moderate to strong, 90%         Ki-67:   10-15%         HER2:         Immunoperoxidase Studies: Equivocal (2+)         In Situ Hybridization for HER2: Negative        -Microcalcifications: Present, predominantly associated with DCIS     Reviewed PMH, PSH, FH, SH, ALL, HCM/IMM, no changes  Reviewed MEDS, no changes    Patient Active Problem List    Diagnosis Date Noted   • Breast cancer in female (HCC) 03/27/2020   • Pain following surgery or procedure  03/27/2020   • Hypovitaminosis D 10/30/2019   • Macrocytosis 04/26/2019   • Macromastia 04/26/2019   • Pernicious anemia 12/20/2017   • Health care maintenance 10/25/2017     CURRENT MEDICATIONS  Current Outpatient Medications   Medication Sig Dispense Refill   • tramadol (ULTRAM) 50 MG Tab TAKE 1 TABLET BY MOUTH EVERY 6 HOURS AS NEEDED FOR 5 DAYS     • Acetaminophen (TYLENOL 8 HOUR PO) Take  by mouth.     • Multiple Vitamin (MULTI-VITAMIN DAILY PO) Take  by mouth every day.     • vitamin D, Ergocalciferol, (DRISDOL) 03563 units Cap capsule Take 1 Cap by mouth every 7 days. 12 Cap 1   • Cyanocobalamin (VITAMIN B 12 PO) Take 1 Tab by mouth every day.     • folic acid (FOLVITE) 1 MG TABS Take 1 mg by mouth every day.       No current facility-administered medications for this visit.      ALLERGIES  Allergies: Shellfish allergy and Nickel  PAST MEDICAL HISTORY  Past Medical History:   Diagnosis Date   • Cancer (HCC) 2020    breast right   • Allergic rhinitis 4/7/2011   • Headache(784.0)     When in school, not a problem now.  With sleep deprivation.   • Pernicious anemia    • Snoring    • Urinary incontinence     minor     SURGICAL HISTORY  She  has a past surgical history that includes lumpectomy (2001); vaginal perineal exam repair (5/14/2012); other (Left, 1981); pr mastectomy, simple, complete (Bilateral, 3/26/2020); node biopsy sentinel (Right, 3/26/2020); and breast biopsy (Right, 01/06/2020).  SOCIAL HISTORY  Social History     Tobacco Use   • Smoking status: Never Smoker   • Smokeless tobacco: Never Used   Substance Use Topics   • Alcohol use: Never     Frequency: Never   • Drug use: No     Social History     Social History Narrative   • Not on file     FAMILY HISTORY  Family History   Problem Relation Age of Onset   • Hypertension Mother    • Thyroid Mother         Hypothyroid   • Hyperlipidemia Father    • Cancer Father         T cell lymphoma   • Heart Disease Maternal Grandfather         MI 38 yo, unknown  "RF.   • Cancer Paternal Uncle         lymphoma     Family Status   Relation Name Status   • Mo Hyperthyroidism Alive        57yo   • Fa     • MGFa  (Not Specified)   • PUnc  (Not Specified)     ROS   Constitutional: Negative for fever, chills.  HENT: Negative for congestion, sore throat.  Eyes: Negative for vision problems.   Respiratory: Negative for cough, shortness of breath.  Cardiovascular: Negative for chest pain, palpitations.   Gastrointestinal: Negative for heartburn, nausea, abdominal pain.   Genitourinary: Negative for dysuria.  Musculoskeletal: Negative for significant myalgia, back and joint pain.   Skin: Negative for rash.   Neuro: Negative for dizziness, weakness and headaches.   Endo/Heme/Allergies: Does not bruise/bleed easily.   Psychiatric/Behavioral: Negative for depression.    Objective   Vitals obtained by patient:  Height 1.676 m (5' 6\")   Weight 69.2 kg (152 lb 8 oz)   Body Mass Index 24.61 kg/m²   Physical Exam:  Constitutional: Alert, no distress, well-groomed.  Skin: No rash in visible areas.  Eye: Round. Conjunctiva clear, lids normal.  ENMT: Lips pink without lesions, good dentition. Phonation normal.  Neck: No visible masses or thyromegaly. Moves freely without pain.  CV: no peripheral cyanosis, tachycardia.  Respiratory: Unlabored respiratory effort, no cough or audible wheezing.  Psych: Alert and oriented x3, normal affect and mood.     Labs     Labs are reviewed and discussed with a patient  Lab Results   Component Value Date/Time    CHOLSTRLTOT 166 2018 07:53 AM    CHOLSTRLTOT 145 2010 08:20 AM    LDL 87 2018 07:53 AM    LDL 71 2010 08:20 AM    HDL 59 2018 07:53 AM    HDL 59 2010 08:20 AM    TRIGLYCERIDE 98 2018 07:53 AM    TRIGLYCERIDE 76 2010 08:20 AM       Lab Results   Component Value Date/Time    SODIUM 139 2018 07:53 AM    SODIUM 131 (L) 2012 03:00 AM    POTASSIUM 4.0 2018 07:53 AM    POTASSIUM 3.7 " 05/20/2012 03:00 AM    CHLORIDE 101 07/03/2018 07:53 AM    CHLORIDE 100 05/20/2012 03:00 AM    CO2 23 07/03/2018 07:53 AM    CO2 21 05/20/2012 03:00 AM    GLUCOSE 87 07/03/2018 07:53 AM    GLUCOSE 109 (H) 05/20/2012 03:00 AM    BUN 14 07/03/2018 07:53 AM    BUN 7 (L) 05/20/2012 03:00 AM    CREATININE 0.77 07/03/2018 07:53 AM    CREATININE 0.67 05/20/2012 03:00 AM    CREATININE 0.80 03/16/2010 08:20 AM    BUNCREATRAT 18 07/03/2018 07:53 AM    BUNCREATRAT 18 03/16/2010 08:20 AM    GLOMRATE >59 03/16/2010 08:20 AM     Lab Results   Component Value Date/Time    ALKPHOSPHAT 64 07/03/2018 07:53 AM    ALKPHOSPHAT 86 05/20/2012 03:00 AM    ASTSGOT 16 07/03/2018 07:53 AM    ASTSGOT 27 05/20/2012 03:00 AM    ALTSGPT 10 07/03/2018 07:53 AM    ALTSGPT 39 05/20/2012 03:00 AM    TBILIRUBIN 0.7 07/03/2018 07:53 AM    TBILIRUBIN 0.9 05/20/2012 03:00 AM      Lab Results   Component Value Date/Time    HBA1C 5.0 09/25/2019 01:24 PM     Lab Results   Component Value Date/Time    TSH 3.150 09/25/2019 01:24 PM    TSH 1.350 07/03/2018 07:53 AM     Lab Results   Component Value Date/Time    WBC 9.0 03/27/2020 04:17 AM    WBC 4.4 03/16/2010 08:20 AM    RBC 2.50 (L) 03/27/2020 04:17 AM    RBC 4.26 03/16/2010 08:20 AM    HEMOGLOBIN 8.8 (L) 03/27/2020 04:17 AM    HEMATOCRIT 26.9 (L) 03/27/2020 04:17 AM    .6 (H) 03/27/2020 04:17 AM     (H) 03/16/2010 08:20 AM    MCH 35.2 (H) 03/27/2020 04:17 AM    MCH 35.0 (H) 03/16/2010 08:20 AM    MCHC 32.7 (L) 03/27/2020 04:17 AM    MPV 9.3 03/27/2020 04:17 AM    NEUTSPOLYS 71 09/25/2019 01:24 PM    NEUTSPOLYS 69.60 06/15/2016 05:23 PM    LYMPHOCYTES 20 09/25/2019 01:24 PM    LYMPHOCYTES 20.40 (L) 06/15/2016 05:23 PM    MONOCYTES 8 09/25/2019 01:24 PM    MONOCYTES 7.70 06/15/2016 05:23 PM    EOSINOPHILS 1 09/25/2019 01:24 PM    EOSINOPHILS 1.20 06/15/2016 05:23 PM    BASOPHILS 0 09/25/2019 01:24 PM    BASOPHILS 0.60 06/15/2016 05:23 PM      Imaging      Reviewed and discussed per  HPI    Assessment and Plan     Tiarra Chavez is a 40 y.o. female    1. Pernicious anemia  B12 level has been within normal range, continue current treatment  Follow-up labs  - CBC WITH DIFFERENTIAL; Future  - VITAMIN B12; Future  2. Macrocytosis  - CBC WITH DIFFERENTIAL; Future  - VITAMIN B12; Future    3. Hypovitaminosis D  Pending labs, may continue OTC vitamin D supplement    4. Malignant neoplasm of lower-inner quadrant of right female breast,  estrogen receptor positive (HCC)  Continue recommended treatment by oncology, currently under chemotherapy, the last cycle was yesterday.    5. Health care maintenance  Due Tdap    Follow-up: In 6 months and as needed

## 2020-09-14 ENCOUNTER — DOCUMENTATION (OUTPATIENT)
Dept: NUTRITION | Facility: MEDICAL CENTER | Age: 40
End: 2020-09-14

## 2020-09-15 ENCOUNTER — PATIENT OUTREACH (OUTPATIENT)
Dept: OTHER | Facility: MEDICAL CENTER | Age: 40
End: 2020-09-15

## 2020-09-15 NOTE — PROGRESS NOTES
"On September 15, 2020, Oncology Social Worker Rosie Scott contacted pt. via telephone to follow up on mental health referral.  OSDEVIN Scott asked permission to get pt. scheduled for counseling.  Pt. asked if it would be through her insurance.  RALPH Scott informed pt. she had contacted [facility] to find out if they accepted the type of insurance pt. has and was informed they do.  Pt. stated, \"that's perfect, yes please schedule me as soon as possible.\"  Pt. stated she is currently not working so her availability is more open.  RALPH Scott informed pt. she would call her back once she was scheduled.        "

## 2020-09-15 NOTE — PROGRESS NOTES
On September 15, 2020, Oncology Social Worker Rosie Scott attempted telephone contact with pt.  OSW Tyler left voicemail message for pt. informing her of appointment with [facility] for October 5th, 2020 at 10:20 am.  OSDEVIN Scott provided pt. with her contact information should pt. have any additional questions or concerns.

## 2020-09-17 ENCOUNTER — TELEPHONE (OUTPATIENT)
Dept: NUTRITION | Facility: MEDICAL CENTER | Age: 40
End: 2020-09-17

## 2020-09-17 NOTE — TELEPHONE ENCOUNTER
Nutrition Services: Telephone Encounter  RD attempted to call for nutrition consultation, though pt did not answer. RD left brief voice message with contact information.     RD following up

## 2020-09-18 ENCOUNTER — TELEPHONE (OUTPATIENT)
Dept: NUTRITION | Facility: MEDICAL CENTER | Age: 40
End: 2020-09-18

## 2020-09-18 NOTE — TELEPHONE ENCOUNTER
Nutrition Services:   Referral received for nutrition services.  Patient is s/p chemotherapy for breast cancer. I called her today to discuss nutrition services.  She reports that she is eating fairly well but is still fatigued from treatment and sometimes finds it difficult to be motivated to eat.  We discussed availability of Eating Well After Cancer Treatment classes as well as 1:1 consultation as needed.  Patient seems interested in classes which will begin October 1st.  Discussed that these will be virtual and I e-mailed patient the flyer and the link to sign up.    Requested patient call us should she need further assistance and encouraged her to attend classes.  RD is available PRN  [phone]

## 2020-09-25 ENCOUNTER — TELEPHONE (OUTPATIENT)
Dept: MEDICAL GROUP | Age: 40
End: 2020-09-25

## 2020-09-25 NOTE — TELEPHONE ENCOUNTER
Phone Number Called: [phone] (home)       Call outcome: Did not leave a detailed message. Requested patient to call back.    Message: LVM for patient to call and schedule a virtual visit to fill out paperwork for disability.
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
## 2020-10-02 DIAGNOSIS — E55.9 HYPOVITAMINOSIS D: ICD-10-CM

## 2020-10-04 RX ORDER — ERGOCALCIFEROL 1.25 MG/1
CAPSULE ORAL
Qty: 12 CAP | Refills: 0 | Status: SHIPPED | OUTPATIENT
Start: 2020-10-04 | End: 2020-11-30

## 2020-10-05 ENCOUNTER — OFFICE VISIT (OUTPATIENT)
Dept: BEHAVIORAL HEALTH | Facility: CLINIC | Age: 40
End: 2020-10-05
Payer: COMMERCIAL

## 2020-10-05 ENCOUNTER — PHYSICAL THERAPY (OUTPATIENT)
Dept: PHYSICAL THERAPY | Facility: REHABILITATION | Age: 40
End: 2020-10-05
Attending: INTERNAL MEDICINE
Payer: COMMERCIAL

## 2020-10-05 DIAGNOSIS — R26.89 OTHER ABNORMALITIES OF GAIT AND MOBILITY: ICD-10-CM

## 2020-10-05 DIAGNOSIS — F43.23 ADJUSTMENT DISORDER WITH MIXED ANXIETY AND DEPRESSED MOOD: ICD-10-CM

## 2020-10-05 DIAGNOSIS — R53.83 OTHER FATIGUE: ICD-10-CM

## 2020-10-05 DIAGNOSIS — Z71.89 GRIEF COUNSELING: ICD-10-CM

## 2020-10-05 DIAGNOSIS — R54 AGE-RELATED PHYSICAL DEBILITY: ICD-10-CM

## 2020-10-05 PROCEDURE — 97163 PT EVAL HIGH COMPLEX 45 MIN: CPT

## 2020-10-05 PROCEDURE — 97110 THERAPEUTIC EXERCISES: CPT

## 2020-10-05 PROCEDURE — 90791 PSYCH DIAGNOSTIC EVALUATION: CPT | Performed by: MARRIAGE & FAMILY THERAPIST

## 2020-10-05 NOTE — OP THERAPY EVALUATION
"  Outpatient Physical Therapy  INITIAL EVALUATION    [facility] Physical Therapy [place] [address] [address]  [address]  [place] [address]  Phone:  [phone]  Fax:  [phone]    Date of Evaluation: 10/05/2020    Patient: Tiarra Chavez  YOB: 1980  MRN: [id number]     Referring Provider: Ade Hahn M.D.  [address]  [place] [address]   Referring Diagnosis Other fatigue [R53.83];Age-related physical debility [R54];Other abnormalities of gait and mobility [R26.89]     Time Calculation    Start time: 1438  Stop time: 1535 Time Calculation (min): 57 minutes         Chief Complaint: Difficulty Walking and Loss Of Balance    Visit Diagnoses     ICD-10-CM   1. Other abnormalities of gait and mobility  R26.89   2. Age-related physical debility  R54   3. Other fatigue  R53.83         Subjective:   History of Present Illness:     Date of onset:  3/5/2020    Mechanism of injury:  *Pt late to appt    Patient is a 40 year old female with a PMH including: Malignant neoplasm R breast, adjustment disorder with mixed anxiety, pernicious anemia. Has a surgical history of: Bilateral complete mastectomy (3/20), R sentinel node biopsy (3/20), L Leg laceration repair (1981).    Pt presents to therapy with complaints of fatigue and instability s/p B complete breast mastectomy (pt reporting previously had DD cup size). Pt endorsing tingling in bilateral UE's with activities such as lifting light objects; additionally randomly when \"just sitting in tub\" since the surgery. Reports tingling occurs across chest into BUE's. Pt denying neck pain. Does endorse occasional difficulty breathing but pt unable to attribute to cause. Additionally complaints of intermittent swelling in RUE and B hands. Endorses dropping objects occasionally.     Pt reports has experienced falls since her operation with last fall 1-2 months ago on the stairs. Pt additionally reporting hitting head on dresser secondary to other " fall in past.  Pt reporting has not navigated stairs in months after fall with descending stairs. Pt endorses fatigue which limit her standing, walking and activity tolerance.      Pt currently on anti-estrogen therapy. Pt denying hx of radiation; last chemo was July 5th. Pt reports she has completed chemo treatment.    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  Hand dominance:  Right  Activities of Daily Living:     Patient reported ADL status: Patient's current daily routine includes:  Work: Full time pharmacist; lift 15# overhead and repetitive pronation/supination and reaching overhead, bend twist and squat  Hobbies: volleyball  Exercise: No current exercise routine in place    Hand hold on shower grab bars to shower, able to perform self care/grooming with increased time mod I, pt reports pt's son assists with upper body dressing and Luis with lower body dressing.     Pt reports she is driving     Pt not utilizing DME; grab bars in the shower    Current activity tolerance: Standing 4 hours before fatigue, prolonged walking 1.5 miles, lifting >8# repetitively    Patient Goals:     Other patient goals:  Goal stand 10 hours; volleyball       Past Medical History:   Diagnosis Date   • Allergic rhinitis 4/7/2011   • Cancer (HCC) 2020    breast right   • Headache(784.0)     When in school, not a problem now.  With sleep deprivation.   • Pernicious anemia    • Snoring    • Urinary incontinence     minor     Past Surgical History:   Procedure Laterality Date   • PB MASTECTOMY, SIMPLE, COMPLETE Bilateral 3/26/2020    Procedure: MASTECTOMY;  Surgeon: Sophy Holbrook M.D.;  Location: SURGERY [facility];  Service:  "General   • NODE BIOPSY SENTINEL Right 3/26/2020    Procedure: BIOPSY, LYMPH NODE, SENTINEL;  Surgeon: Sophy Holbrook M.D.;  Location: SURGERY [facility];  Service: General   • BREAST BIOPSY Right 2020    Rt Breast Bx   • VAGINAL PERINEAL EXAM REPAIR  2012    Performed by HUSSAIN RANGEL at [facility] DELIVERY   • LUMPECTOMY      \"[facility]   • OTHER Left 1981    laceration repair  left leg     Social History     Tobacco Use   • Smoking status: Never Smoker   • Smokeless tobacco: Never Used   Substance Use Topics   • Alcohol use: Never     Frequency: Never     Family and Occupational History     Socioeconomic History   • Marital status: Single     Spouse name: Not on file   • Number of children: Not on file   • Years of education: Not on file   • Highest education level: Not on file   Occupational History   • Not on file       Objective     Neurological Testing     Additional Neurological Details  Impaired sensation R flank and R upper arm per pt    Active Range of Motion     Additional Active Range of Motion Details  BLE AROM WFL (Assessed in sitting)    Strength:      Left Hip   Planes of Motion   Flexion: 4+    Right Hip   Planes of Motion   Flexion: 4+    Left Knee   Extension: 4+    Right Knee   Extension: 4+    Left Ankle/Foot   Dorsiflexion: 4+  Plantar flexion: 4+    Right Ankle/Foot   Dorsiflexion: 4+  Plantar flexion: 4+    Additional Strength Details  Pt able to stand on toes and heels with manual assist from therapist for balance    General Comments     Spine Comments   Vitals: (On room air; assessed in sitting)   HR: 84   SpO2: 96%  *No signs of distress    Endurance/Strength testin sec sit to stand: 11; fatigue (below avg)    Vitals after 30 sec sit to stand: (On room air; assessed in sitting)   HR: 85   SpO2: 96  *No signs of distress    Girth Measurements in cm:  bicep: R: 28 L: 27.5  elbow: R:24.5  L: 26  forearm: R: 25; L: 25  wrist: R: 16; L: 16.75  palm: R: " 20.5 L: 20.25                Therapeutic Exercises (CPT 58781):     1. Walking program (pacing schedule), hep    2. Pt education, lymphedema education, pacing education      Time-based treatments/modalities:    Physical Therapy Timed Treatment Charges  Therapeutic exercise minutes (CPT 60274): 10 minutes      Assessment, Response and Plan:   Impairments: difficulty performing job, impaired balance, impaired physical strength, lacks appropriate home exercise program, limited ADL's, limited mobility, pain with function and swelling    Other Impairments:  Fatigue/limited endurance  Assessment details:  Patient is a 40 year old female with a PMH including: Malignant neoplasm R breast, adjustment disorder with mixed anxiety, pernicious anemia. Has a surgical history of: Bilateral complete mastectomy (3/20), R sentinel node biopsy (3/20), L Leg laceration repair (1981). Pt presents to therapy with complaints of fatigue, deconditioning and falls s/p her operation. Pt additionally has complaints of BUE and chest pain n/t and c/o BUE swelling (will contact MD for appropriate referrals for further evaluation in subsequent sessions). Session somewhat limited today secondary to extensive pt history, pt questions and additional pt complaints (I.e. swelling, chest and BUE pain, cancer rehab questions, numbness/tingling complaints in BUE's).     Pt may benefit from skilled physical therapy in order to address above impairments in order to improve QOL, return to reported ADL's, and successful return to work.     Barriers to therapy:  Poorly tolerated treatments  Other barriers to therapy:  Additional barriers potentially from BUE swelling/n and t (not yet evaluated)  Prognosis comment:  Good/fair  Goals:   Short Term Goals:   1. Pt will be independent with written HEP.  2. Pt will be independent with walking program.  3. Pt will be able to  shower without hand hold assist.  Short term goal time span:  2-4 weeks      Long  Term Goals:    1. Pt will be independent with written HEP.  2. Pt will have a 30 sec sit to stand test within norms for age/gender.  3. Pt will be able to tolerate 10 hours standing without limitations secondary to fatigue.  4. Pt will be able to return to work without restrictions.  5. Pt will be able to lift 15# from counter height to above shoulder height 10x without fatigue in order to be successful at work.    Long term goal time span:  6-8 weeks    Plan:   Therapy options:  Physical therapy treatment to continue  Planned therapy interventions:  Neuromuscular Re-education (CPT 91130), Manual Therapy (CPT 73882), Mechanical Traction (CPT 21907), E Stim Unattended (CPT 07688), Therapeutic Exercise (CPT 76502), Therapeutic Activities (CPT 24126) and Gait Training (CPT 13471)  Frequency: 1-2x/wk.  Duration in weeks:  8  Discussed with:  Patient  Plan details:  UPOC: 12/4/20          Functional Assessment Used  Lower Extremity Functional Scale Total: 58.75     Referring provider co-signature:  I have reviewed this plan of care and my co-signature certifies the need for services.    Certification Period: 10/05/2020 to  12/4/20    Physician Signature: ________________________________ Date: ______________

## 2020-10-06 ASSESSMENT — ACTIVITIES OF DAILY LIVING (ADL): POOR_BALANCE: 1

## 2020-10-07 ENCOUNTER — PHYSICAL THERAPY (OUTPATIENT)
Dept: PHYSICAL THERAPY | Facility: REHABILITATION | Age: 40
End: 2020-10-07
Attending: INTERNAL MEDICINE
Payer: COMMERCIAL

## 2020-10-07 ENCOUNTER — TELEPHONE (OUTPATIENT)
Dept: PHYSICAL THERAPY | Facility: REHABILITATION | Age: 40
End: 2020-10-07

## 2020-10-07 DIAGNOSIS — R53.83 OTHER FATIGUE: ICD-10-CM

## 2020-10-07 DIAGNOSIS — R26.89 OTHER ABNORMALITIES OF GAIT AND MOBILITY: ICD-10-CM

## 2020-10-07 DIAGNOSIS — R54 AGE-RELATED PHYSICAL DEBILITY: ICD-10-CM

## 2020-10-07 PROCEDURE — 97110 THERAPEUTIC EXERCISES: CPT

## 2020-10-07 NOTE — OP THERAPY DAILY TREATMENT
"  Outpatient Physical Therapy  DAILY TREATMENT     [facility] Physical Therapy [address].  [address]  [place] [address]  Phone:  [phone]  Fax:  [phone]    Date: 10/07/2020    Patient: Tiarra Chavez  YOB: 1980  MRN: [id number]     Time Calculation    Start time: 1001  Stop time: 1036 Time Calculation (min): 35 minutes         Chief Complaint: Difficulty Walking and Loss Of Balance    Visit #: 2    SUBJECTIVE:  I talked to my doctor and she already sent a lymphedema referral to [facility] for me. I have an appointment on the 19th.       OBJECTIVE:  Current objective measures:           Therapeutic Exercises (CPT 02369):     1. Upright bike, x5 min; no resistance, warm up endurance; slight SOB; slight dizziness with getting off bike. Slight increase R>L hand tingling (palm bilaterally and 3rd and 4th finger)    2. Ball bridge, hold for endurance, 3/4 AROM posterior chain weakness; 2 min 22 sec; tingling in UE; hep    3. HS rolls +TrA brace, 15x, hep    4. Plank at counter top, 30 sec, hep    5. Pt education, TrA engagement and importance of core for endurance/stability    6. Upright bike, x5 min no resistance, no additional charge      Therapeutic Exercise Summary: Pt performed these exercises with instruction and SPV.  Provided handout with these exercises for daily HEP.        Time-based treatments/modalities:    Physical Therapy Timed Treatment Charges  Therapeutic exercise minutes (CPT 13060): 30 minutes  Pain rating (1-10) after treatment:  Fatigue rating 4       ASSESSMENT:   Response to treatment:   Pt reporting minor increase dizziness after bike warm up  (\"like being on a boat\") which was relieved supine lying x 2 min. Pt advised to monitor with exercises and change in positions. Pt demonstrating core and posterior chain weakness; additionally onset of R>LUE n/t (see above). Overall 4 rating of fatigue following session today. Pt encouraged to continue pacing with " walking program at home and incorporate endurance hep into ADL's. Additional time on upright bike for endurance at no add charge.    Pt reporting lymphedema appt on 10/19. Submitted inquiry for referral to address UE/neck; currently awaiting response from PCP.    PLAN/RECOMMENDATIONS:   Plan for treatment: therapy treatment to continue next visit.  Planned interventions for next visit: continue with current treatment.  Continue endurance and strengthening as tolerated. Monitor for s/s dizziness, n/t in UE's.

## 2020-10-12 ENCOUNTER — PHYSICAL THERAPY (OUTPATIENT)
Dept: PHYSICAL THERAPY | Facility: REHABILITATION | Age: 40
End: 2020-10-12
Attending: INTERNAL MEDICINE
Payer: COMMERCIAL

## 2020-10-12 DIAGNOSIS — R53.83 OTHER FATIGUE: ICD-10-CM

## 2020-10-12 DIAGNOSIS — R26.89 OTHER ABNORMALITIES OF GAIT AND MOBILITY: ICD-10-CM

## 2020-10-12 DIAGNOSIS — R54 AGE-RELATED PHYSICAL DEBILITY: ICD-10-CM

## 2020-10-12 PROCEDURE — 97110 THERAPEUTIC EXERCISES: CPT

## 2020-10-12 NOTE — OP THERAPY DAILY TREATMENT
Outpatient Physical Therapy  DAILY TREATMENT     [facility] Physical [address]  [address]  [place] [address]  Phone:  [phone]  Fax:  [phone]    Date: 10/12/2020    Patient: Tiarra Chavez  YOB: 1980  MRN: [id number]     Time Calculation    Start time: 0730  Stop time: 0816 Time Calculation (min): 46 minutes         Chief Complaint: Loss Of Balance and Difficulty Walking    Visit #: 3    SUBJECTIVE:  I know we didn't do that much exercise last time but I went home and took a three hour nap after the last session. The pellet they put into my body has n/t as a side effect. I have had no real episodes of dizziness at home.       OBJECTIVE:  Current objective measures:         Therapeutic Exercises (CPT 98826):     1. Upright bike, x5 min; no resistance, warm up endurance; slight SOB; slight dizziness with getting off bike. No SOB or n/t in UE today    2. Ball bridge, hold for endurance, Full AROM posterior chain weakness; 2 min 22 sec; tingling in UE; hep    3. HS rolls +TrA brace, 15x, reviewed TrA brace    4. Plank at counter top, 30 sec, reviewed; educated on ways to progress at home    6. Upright bike, x5 min no resistance, no additional charge    8. Shuttle, x10 5c DL; x10 6c DL; x20 ea SL 4c    9. Rest break x 5 min     10. Step ups onto 4 in block for endurance (2 min rest breaks in b/w trials), trial 1, 2 min 49 sec; vitals: HR: 81; SPO2: 96%; slight onset of n/t in UE's; fatigue , trial 2 , 2 min 57 bsec; Vitals: HR: 84; SPO2: 97%; fatigue    12. Pt education, re: performing exercises and pacing at home to tolerance; offered eval room for pt to rest before driving home if desired      Therapeutic Exercise Summary: Pt performed these exercises with instruction and SPV.  Provided handout with these exercises for daily HEP.        Time-based treatments/modalities:    Physical Therapy Timed Treatment Charges  Therapeutic exercise minutes (CPT 53595): 39  minutes  Pain rating (1-10) after treatment:  Fatigue rating 4       ASSESSMENT:   Response to treatment:   Several rest breaks required for fatigue and low endurance. Demonstrating some increases in strength with improved bridge. Pt continuing to report s/s of n/t in UE's with some increase during exercises such as the bridge (will continue to monitor in sessions); Awaiting neck/UE referral from PCP to eval for c/o n/t in chest and BUE. No reports of dizziness/SOB today when questioned throughout session.  Pt encouraged to continue pacing with walking program at home and perform hep in small segments throughout the day to avoid over-fatigue.  Session ended early due to pt reporting fatigue.    Pt reporting lymphedema appt on 10/19.     PLAN/RECOMMENDATIONS:   Plan for treatment: therapy treatment to continue next visit.  Planned interventions for next visit: continue with current treatment.  Continue endurance and strengthening as tolerated. Monitor for s/s dizziness, n/t in UE's in sessions.

## 2020-10-13 ENCOUNTER — HOSPITAL ENCOUNTER (OUTPATIENT)
Dept: RADIOLOGY | Facility: MEDICAL CENTER | Age: 40
End: 2020-10-13
Attending: INTERNAL MEDICINE
Payer: COMMERCIAL

## 2020-10-13 DIAGNOSIS — M85.89 DISAPPEARING BONE DISEASE: ICD-10-CM

## 2020-10-13 PROCEDURE — 77080 DXA BONE DENSITY AXIAL: CPT

## 2020-10-15 ENCOUNTER — PHYSICAL THERAPY (OUTPATIENT)
Dept: PHYSICAL THERAPY | Facility: REHABILITATION | Age: 40
End: 2020-10-15
Attending: INTERNAL MEDICINE
Payer: COMMERCIAL

## 2020-10-15 DIAGNOSIS — R26.89 OTHER ABNORMALITIES OF GAIT AND MOBILITY: ICD-10-CM

## 2020-10-15 DIAGNOSIS — R54 AGE-RELATED PHYSICAL DEBILITY: ICD-10-CM

## 2020-10-15 DIAGNOSIS — R53.83 OTHER FATIGUE: ICD-10-CM

## 2020-10-15 PROCEDURE — 97110 THERAPEUTIC EXERCISES: CPT

## 2020-10-15 NOTE — OP THERAPY DAILY TREATMENT
Outpatient Physical Therapy  DAILY TREATMENT     [facility] Physical [address]  [address]  [place] [address]  Phone:  [phone]  Fax:  [phone]    Date: 10/15/2020    Patient: Tiarra Chavez  YOB: 1980  MRN: [id number]     Time Calculation    Start time: 0901  Stop time: 0954 Time Calculation (min): 53 minutes         Chief Complaint: Loss Of Balance and Difficulty Walking    Visit #: 4    SUBJECTIVE:  I'm just a little tired today and I have a headache. I did need a 2 hour nap after our last session.    Goals:   Short Term Goals:   1. Pt will be independent with written HEP. (Met)  2. Pt will be independent with walking program. (Ongoing)  3. Pt will be able to  shower without hand hold assist.  Short term goal time span:  2-4 weeks      Long Term Goals:    1. Pt will be independent with written HEP.  2. Pt will have a 30 sec sit to stand test within norms for age/gender.  3. Pt will be able to tolerate 10 hours standing without limitations secondary to fatigue.  4. Pt will be able to return to work without restrictions.  5. Pt will be able to lift 15# from counter height to above shoulder height 10x without fatigue in order to be successful at work.     Long term goal time span:  6-8 weeks    OBJECTIVE:  Current objective measures:         Therapeutic Exercises (CPT 49370):     1. Upright bike, x6 min; no resistance, warm up endurance    2. Ball bridge, hold for endurance, 2.07 sec; 2.10 sec;  full     3. HS rolls +TrA brace, 15x, reviewed; edu to use towel roll at home if req tactile cuing    4. Plank at counter top, 30 sec, reviewed     10. Step ups onto 4 in block for endurance (2 min rest breaks in b/w trials), trial 1, 2 min 49 sec; vitals: HR: 81; SPO2: 96%; slight onset of n/t in UE's; fatigue     12. [place], x10 level 1 TB; 3 sec hold, discontinued    13. Rows, x10 level 1 TB; 3 sec hold, discontinued    14. Lat pull downs, x10 level 1 TB; 3 sec  hold, discontinued    16. Rest break after banded exercises x3 min    17. [place] with [place] band , x10 with level 1 TB (tied in [place]); 3 sec hold, added to hep; min n/t during, none after ex completed    18. [place], x5 with 5 sec hold , added to hep    19. Reverse push up at wall, x10, added to hep      Therapeutic Exercise Summary: Pt performed these exercises with instruction and SPV.  Provided handout with these exercises for daily HEP.    Discontinued therex above due to onset of n/t in UE's with grasping        Time-based treatments/modalities:    Physical Therapy Timed Treatment Charges  Therapeutic exercise minutes (CPT 16724): 48 minutes  Pain rating (1-10) after treatment:  Fatigue rating 4       ASSESSMENT:   Response to treatment:   Only one rest break today secondary to fatigue. Pt demonstrating good set up position and core engagement with exercises above, however, reporting n/t in UE's secondary to gripping with banded exercises; therefore modified as above with only min n/t noted with flasher modifications; none after exercise completion. Progressing core strength endurance with increased AROM and time as witnessed with ball bridge above. Pt does admit to requiring naps after PT sessions, will continue to monitor and regress intensity/duration of therex if over fatiguing to pt.    Pt reporting lymphedema appt on 10/19.   Awaiting referral for UE/neck to address c/o n/t in UE's (pt advised to contact PCP if no response by next session if continuing to have complaints)    PLAN/RECOMMENDATIONS:   Plan for treatment: therapy treatment to continue next visit.  Planned interventions for next visit: continue with current treatment.  Continue endurance and strengthening as tolerated. Monitor for s/s fatigue post sessions, n/t in UE's in sessions. Review lymphedema visit; review new hep.

## 2020-10-15 NOTE — BH THERAPY
[facility]  Therapy Progress Note         Patient Name: Tiarra Chavez  Patient MRN: [id number]  Today's Date: 10/19/2020     Type of session:Individual psychotherapy  Length of session: 45 minutes  Persons in attendance:Patient    Subjective/New Info: She reports that her lymphedema specialist recommend to slow down on her exercises, such as PT for lymphedema at [facility] and continue to balance mediations, and counseling.    She also reports that she could fill the stress while driving such as driving her son to school, avoiding congestion of the business during school rush hour. Patient also learn that she may need to take out her ovaries and weighing her pros & cons.     Psychoeducation and goals with 100 day commitments for positive changes to happen. Positive parenting skills with her Son and practicing the love languages; and lavish Son with love as parents are not together any longer, while patient is healing from cancer and health related issues. Build trust, courage, and luisa in child; and provide praise and compliments and age appropriate expectations.     Narrative therapy of family of origin, high work ethics passed on to patient and her expectations of her son; and how to heal and balance expectations. Healthy boundaries with family of origin.     Objective/Observations:   Participation: Active verbal participation, Engaged and Open to feedback   Grooming: Casual   Cognition: Fully Oriented   Eye contact: Good   Mood: Depressed and Anxious   Affect: Full range and Congruent with content   Thought process: Goal-directed   Speech: Rate within normal limits   Other:     Diagnoses:   1. Adjustment disorder with mixed anxiety and depressed mood    2. Grief counseling    3. Health care maintenance         Current risk:   SUICIDE: Not applicable   Homicide: Not applicable   Self-harm: Not applicable   Relapse: Not applicable   Other:    Safety Plan reviewed? Not Indicated   If evidence of imminent  risk is present, intervention/plan:     Therapeutic Intervention(s): Communication skills, Conflict resolution skills, Distress tolerance skills, Establish rapport, Interpersonal effectiveness skills, Leisure and recreation skills, Maladaptive behavior addressed, Parenting skills, Positive behavior reinforced, Problem-solving, Self-care skills, Stressors assessed and Supportive psychotherapy    Treatment Goal(s)/Objective(s) addressed: 100 days of commitments of goal: 1. Daily affirmation/self-care measures 2. Stress management 3. Time-management (mindfulness apps; deep breathing relaxation skills/YouTube visual imagery). 4. Health and fitness (with strategies/activities for each goals to explore in session and out of session.    Progress toward Treatment Goals: Mild improvement    Plan:  - Patient is in agreement with the above plan:  YES    RODRÍGUEZ Roy  10/19/2020

## 2020-10-19 ENCOUNTER — OFFICE VISIT (OUTPATIENT)
Dept: BEHAVIORAL HEALTH | Facility: CLINIC | Age: 40
End: 2020-10-19
Payer: COMMERCIAL

## 2020-10-19 DIAGNOSIS — Z71.89 GRIEF COUNSELING: ICD-10-CM

## 2020-10-19 DIAGNOSIS — F43.23 ADJUSTMENT DISORDER WITH MIXED ANXIETY AND DEPRESSED MOOD: ICD-10-CM

## 2020-10-19 DIAGNOSIS — Z00.00 HEALTH CARE MAINTENANCE: ICD-10-CM

## 2020-10-19 PROCEDURE — 90834 PSYTX W PT 45 MINUTES: CPT | Performed by: MARRIAGE & FAMILY THERAPIST

## 2020-10-21 ENCOUNTER — PHYSICAL THERAPY (OUTPATIENT)
Dept: PHYSICAL THERAPY | Facility: REHABILITATION | Age: 40
End: 2020-10-21
Attending: INTERNAL MEDICINE
Payer: COMMERCIAL

## 2020-10-21 DIAGNOSIS — R54 AGE-RELATED PHYSICAL DEBILITY: ICD-10-CM

## 2020-10-21 DIAGNOSIS — R53.83 OTHER FATIGUE: ICD-10-CM

## 2020-10-21 DIAGNOSIS — R26.89 OTHER ABNORMALITIES OF GAIT AND MOBILITY: ICD-10-CM

## 2020-10-21 PROCEDURE — 97110 THERAPEUTIC EXERCISES: CPT

## 2020-10-21 NOTE — OP THERAPY DAILY TREATMENT
Outpatient Physical Therapy  DAILY TREATMENT     [facility] Physical [address].  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 I saw her on Monday and then it came on Tuesday night; it was a little itchy but it's gone today. I also had a little bit of hives on my arms and legs so I'm not sure what's going on. She told me that I do have a little bit of lymphedema and she wants to do some treatments with me. I tried doing the pull downs at home and it really hurt my hand. I did have a fall when I was trying to put on my shorts.       Goals:   Short Term Goals:   1. Pt will be independent with written HEP. (Met)  2. Pt will be independent with walking program. (Ongoing)  3. Pt will be able to  shower without hand hold assist.  Short term goal time span:  2-4 weeks      Long Term Goals:    1. Pt will be independent with written HEP.  2. Pt will have a 30 sec sit to stand test within norms for age/gender.  3. Pt will be able to tolerate 10 hours standing without limitations secondary to fatigue.  4. Pt will be able to return to work without restrictions.  5. Pt will be able to lift 15# from counter height to above shoulder height 10x without fatigue in order to be successful at work.     Long term goal time span:  6-8 weeks    OBJECTIVE:  Current objective measures:        SLS:   R: >10 sec  L: >10 sec    Therapeutic Exercises (CPT 75438):     1. Upright bike, x6 min; no resistance, warm up endurance; dizziness after x 1 min    2. Verbal review of hep, pt educated to only perform exercises on hep HO; not to  perform lat pull downs; explained that these were removed due to onset n/t in last session    3. HS rolls +TrA brace, 15x, reviewed; edu to use towel roll at home if req tactile cuing    4. Plank at counter top, discontinued per request of lymphedema specialist; reviewed with pt; pt verbalized understanding    8. 4 way hip, 5x ea , fatigue; VC's to exhale during hardest portion of exercise; limited R>L hip extension AROM; hip adduction AROM limited bilaterally with muscle juddering noted    9. Diaphragmatic breathing, x3 min, added to hep; VC's for set up and edu re: impact on lymph flow; dizziness following supine>sit    10. Step ups onto 4 in block for endurance (2 min rest breaks in b/w trials), trial 1, 2 min 49 sec; vitals: HR: 81; SPO2: 96%; slight onset of n/t in UE's; fatigue     11. Balance in corner, SLS with arm raises, added to hep    12. Marching in corner, marching, 70 deg hip flexion observed bilaterally with fatigue    13. [place] isometric, temporary hold while working on chest wall excursion with lymphedema specialist      Therapeutic Exercise Summary: Pt performed these exercises with instruction and SPV.  Provided handout with these exercises for daily HEP.    Discontinued therex above due to onset of n/t in UE's with grasping        Time-based treatments/modalities:    Physical Therapy Timed Treatment Charges  Therapeutic exercise minutes (CPT 68807): 49 minutes           ASSESSMENT:   Response to treatment:   Good static single leg balance; noted fatigue with marching with limited hip flexion AROM and additional dynamic challenges. Additional hip AROM limitations with fatigue, most with hip adduction. Pt reporting improved endurance and requiring naps of less duration following PT sessions at home. Pt may continue to benefit from core and posterior chain strengthening with precautions to UE/chest wall (see below).    Followed up with lymphedema specialist: temporary hold on planking due to  recent findings of lymphedema and chest wall restrictions from lymphedema evaluation (Mon). Onset of n/t with flasher isometric with circular band in session; possible contribution from limited chest wall excursion. Will place additional hold on excessive UE therex causing s/s; discussed with pt. Updated hep and provided HO to pt. Pt currently awaiting authorization to continue with lymphedema appts.      PLAN/RECOMMENDATIONS:   Plan for treatment: therapy treatment to continue next visit.  Planned interventions for next visit: continue with current treatment.  Continue endurance and strengthening as tolerated. Monitor for s/s fatigue post sessions, n/t in UE's in sessions.

## 2020-10-26 ENCOUNTER — PHYSICAL THERAPY (OUTPATIENT)
Dept: PHYSICAL THERAPY | Facility: REHABILITATION | Age: 40
End: 2020-10-26
Attending: INTERNAL MEDICINE
Payer: COMMERCIAL

## 2020-10-26 DIAGNOSIS — R54 AGE-RELATED PHYSICAL DEBILITY: ICD-10-CM

## 2020-10-26 DIAGNOSIS — R26.89 OTHER ABNORMALITIES OF GAIT AND MOBILITY: ICD-10-CM

## 2020-10-26 DIAGNOSIS — R53.83 OTHER FATIGUE: ICD-10-CM

## 2020-10-26 PROCEDURE — 97110 THERAPEUTIC EXERCISES: CPT

## 2020-10-26 NOTE — OP THERAPY DAILY TREATMENT
Outpatient Physical Therapy  DAILY TREATMENT     [facility] Physical [address].  [address]  [place] [address]  Phone:  [phone]  Fax:  [phone]    Date: 10/26/2020    Patient: Tiarra Chavez  YOB: 1980  MRN: [id number]     Time Calculation    Start time: 0731  Stop time: 0816 Time Calculation (min): 45 minutes         Chief Complaint: Difficulty Walking and Loss Of Balance    Visit #: 6    SUBJECTIVE:  My authorization was approved and I'm going to be seeing Ceci six times for lymphedema. I also got into the [facility], then I did a hike; afterwards I was toast.   I got up and I was seeing stars for a couple minutes. They are going to be holding one of my medications to see if it helps with my fatigue. My stamina has gotten a little better and I think I'm a little stronger.      Goals:   Short Term Goals:   1. Pt will be independent with written HEP. (Met)  2. Pt will be independent with walking program. (Met)  3. Pt will be able to  shower without hand hold assist. (Ongoing goal)  Short term goal time span:  2-4 weeks      Long Term Goals:    1. Pt will be independent with written HEP.  2. Pt will have a 30 sec sit to stand test within norms for age/gender.  3. Pt will be able to tolerate 10 hours standing without limitations secondary to fatigue.  4. Pt will be able to return to work without restrictions.  5. Pt will be able to lift 15# from counter height to above shoulder height 10x without fatigue in order to be successful at work.     Long term goal time span:  6-8 weeks    OBJECTIVE:  Current objective measures:        [place] Advanced Balance (LAYNE) scale: 40/40  Notes:  -Dizziness with standing on firm EC, turn 360, standing EC on foam  -Slight delayed step with postural control to R  -SLS >10 sec bilaterally    Therapeutic Exercises (CPT 75282):     1. Upright bike, x6 min; no resistance, warm up endurance    8. 4 way hip, verbal  review    9. Diaphragmatic breathing, verbal review    15. SL cone tapping, 10x ea, no UE assist required, no LOB    16. Wobble board sagittal, 10x ea, min difficulty with posterior and R lateral direction; pt reporting fearful, improved with increased reps, no UE support req    17. Mini squats on bosu ball , x10, one instance UE use to maintain balance    18. LAYNE balance assessment , see obj      Time-based treatments/modalities:    Physical Therapy Timed Treatment Charges  Therapeutic exercise minutes (CPT 46851): 45 minutes       ASSESSMENT:   Response to treatment:   Full score on [place] Advanced balance assessment with some noted dizziness and minor delayed reaction with R postural reaction (see obj for more details). Based on this, pt's reports of LOB and falls may be more likely due to fatigue rather than balance impairments at this time. Pt discontinuing medication under MD orders believed to be contributing to her fatigue; reports expecting to see difference in one week. Pt continuing to be limited by fatigue in sessions.     Pt's auth approved for lymphedema treatment; specialist requesting temporary hold on planking due to recent findings of lymphedema and chest wall restrictions from lymphedema evaluation. Additionally, pt recently started cardiac fitness through [facility].     Will reassess pt's fatigue and goals at subsequent session and determine further POC.        PLAN/RECOMMENDATIONS:   Plan for treatment: therapy treatment to continue next visit.  Planned interventions for next visit: continue with current treatment.  Reassessment for continuing POC. Assess fatigue.

## 2020-10-29 ENCOUNTER — APPOINTMENT (OUTPATIENT)
Dept: PHYSICAL THERAPY | Facility: REHABILITATION | Age: 40
End: 2020-10-29
Attending: INTERNAL MEDICINE
Payer: COMMERCIAL

## 2020-10-29 NOTE — BH THERAPY
" [facility]  Therapy Progress Note      Patient Name: Tiarra Chavez  Patient MRN: [id number]  Today's Date: 10/29/2020     Type of session:Individual psychotherapy  Length of session: 45 minutes  Persons in attendance:Patient    Subjective/New Info: Ms. Mayen reports that she cried for about 90 mins with her friend with [place] culture who got her into yoga, and one of the sessions was of love and care.     She also indicates that her energy is low and friends and other medical professionals has noticed that her energy is low as well, and plans to work on affirmations and rest more.     She has insight on needing to work on frustrations and stress level as she became easily upset and \"yelled at her son, and feels bad about it in regard to her son telling patient about his Dad complaining about finances and about patient.\"    Patient reports that Mom will be visiting and staying for about a couple of months ongoing healthy boundaries; and openness to grieving and open relationship with Mom about processing and thoughts about late Dad.     Objective/Observations:   Participation: Active verbal participation, Engaged and Open to feedback   Grooming: Casual   Cognition: Fully Oriented   Eye contact: Good   Mood: Depressed and Anxious   Affect: Full range and Congruent with content   Thought process: Goal-directed   Speech: Rate within normal limits   Other:     Diagnoses:   1. Adjustment disorder with mixed anxiety and depressed mood    2. Grief counseling         Current risk:   SUICIDE: Not applicable   Homicide: Not applicable   Self-harm: Not applicable   Relapse: Not applicable   Other:    Safety Plan reviewed? Not Indicated   If evidence of imminent risk is present, intervention/plan:     Therapeutic Intervention(s): Cognitive modification, Conflict resolution skills, Distress tolerance skills, Goal-setting, Interpersonal effectiveness skills, Leisure and recreation skills, Maladaptive behavior " "addressed, Parenting/familial roles addressed, Problem-solving, Self-care skills, Stressors assessed and Supportive psychotherapy    Treatment Goal(s)/Objective(s) addressed:100 days of commitments of goal: 1. Daily affirmation/self-care measures 2. Stress management 3. Time-management (mindfulness apps; deep breathing relaxation skills/YouTube visual imagery). 4. Health and fitness (with strategies/activities for each goals to explore in session and out of session.     Progress toward Treatment Goals: Mild improvement    Plan:  - \"Homework\" recommendation: Psychoeducation on the 4 Rs (Reflect, Reframe, Reasoning, Respond)  - Patient is in agreement with the above plan:  YES    Crystal Breen [id number]  10/29/2020                                   "

## 2020-11-02 ENCOUNTER — OFFICE VISIT (OUTPATIENT)
Dept: BEHAVIORAL HEALTH | Facility: CLINIC | Age: 40
End: 2020-11-02
Payer: COMMERCIAL

## 2020-11-02 DIAGNOSIS — F43.23 ADJUSTMENT DISORDER WITH MIXED ANXIETY AND DEPRESSED MOOD: ICD-10-CM

## 2020-11-02 DIAGNOSIS — Z71.89 GRIEF COUNSELING: ICD-10-CM

## 2020-11-02 PROCEDURE — 90834 PSYTX W PT 45 MINUTES: CPT | Performed by: MARRIAGE & FAMILY THERAPIST

## 2020-11-03 ENCOUNTER — PHYSICAL THERAPY (OUTPATIENT)
Dept: PHYSICAL THERAPY | Facility: REHABILITATION | Age: 40
End: 2020-11-03
Attending: INTERNAL MEDICINE
Payer: COMMERCIAL

## 2020-11-03 DIAGNOSIS — R53.83 OTHER FATIGUE: ICD-10-CM

## 2020-11-03 DIAGNOSIS — R54 AGE-RELATED PHYSICAL DEBILITY: ICD-10-CM

## 2020-11-03 DIAGNOSIS — R26.89 OTHER ABNORMALITIES OF GAIT AND MOBILITY: ICD-10-CM

## 2020-11-03 PROCEDURE — 97110 THERAPEUTIC EXERCISES: CPT

## 2020-11-03 NOTE — OP THERAPY DAILY TREATMENT
Outpatient Physical Therapy  DAILY TREATMENT     [facility] [address].  [address]  [place] [address]  Phone:  [phone]  Fax:  [phone]    Date: 11/03/2020    Patient: Tiarra Chavez  YOB: 1980  MRN: [id number]     Time Calculation                   Chief Complaint: Difficulty Walking    Visit #: 7    SUBJECTIVE:  I'm trying to do walks when I can but now it's getting cold. I do feel a little improvement in my fatigue but overall I still feel really tired since stopping one of my medications. I was in bed all day on Sunday. I did just start the cardiac rehab at [facility].    I don't feel like I've seen a lot of improvement in fatigue, I feel like I have had a little improvement in core and I'm aware of my posture now.   I do see my doctor today to discuss RTW.     Goals:   Short Term Goals:   1. Pt will be independent with written HEP. (Met)  2. Pt will be independent with walking program. (Met)  3. Pt will be able to  shower without hand hold assist. (Met)  Short term goal time span:  2-4 weeks      Long Term Goals:    1. Pt will be independent with written HEP.  2. Pt will have a 30 sec sit to stand test within norms for age/gender.  3. Pt will be able to tolerate 10 hours standing without limitations secondary to fatigue. (Ongoing goal; pt reporting 2 hour tolerance)  4. Pt will be able to return to work without restrictions. (Ongoing goal; pt will see MD 11/3/20 to discuss)  5. Pt will be able to lift 15# from counter height to above  shoulder height 10x without fatigue in order to be successful at work. (Ongoing goal)     Long term goal time span:  6-8 weeks    OBJECTIVE:  Current objective measures:        Strength:  Previous scores at eval listed; today's scores in parentheses     Left Hip   Planes of Motion   Flexion: 4+ (now 5)     Right Hip   Planes of Motion   Flexion: 4+ (now 5)     Left Knee   Extension: 4+ (now 5)     Right Knee    Extension: 4+ (now 5)     Left Ankle/Foot   Dorsiflexion: 4+ (now 5)  Plantar flexion: 4+ (now 5)     Right Ankle/Foot   Dorsiflexion: 4+ (now 4+)  Plantar flexion: 4+ (now 4+)     Endurance/Strength testin sec sit to stand: 11 at eval; today 12 with fatigue following       Balance: Assessed 10/26/20  SLS >10 sec bilaterally (normal >10 seconds)  [place] Advanced Balance (LAYNE) scale: 40/40    Therapeutic Exercises (CPT 58673):     1. Upright bike, x6 min; no resistance, warm up endurance    2. Shuttle, 6c then 7c 1x10 DL, 5c then 6c 1x10 SL ea    3. Verbal review of hep    4. Objective testing    8. 4 way hip, verbal review      Time-based treatments/modalities:            ASSESSMENT:   Response to treatment:   Pt has attended 7 visits of skilled physical therapy and reports improved postural awareness and core strength; however reports min improvement in fatigue. Continues to require lengthy naps following PT sessions despite frequent rest breaks provided during sessions. Pt recently stopped one of her medications under MD orders with min improvement in fatigue.     Overall, pt demonstrating full score on [place] Advanced balance assessment and normal SLS (>10 sec). Suspect pt's reports of LOB and falls may be more likely due to fatigue rather than balance impairments as previously mentioned.    Pt is currently receiving lymphedema treatments; specialist requesting temporary hold on planking due to recent findings of lymphedema and chest wall restrictions from evaluation findings. Some limitations with providing pt with UE and chest wall related therex; additionally restricting ability to meet lifting goal set initially. Additionally, pt recently started cardiac rehab through [facility].     Will contact MD and lymphedema therapist re: POC and progression in therapy as limited ability to progress based on restrictions and receiving additional rehab elsewhere at this  time.    PLAN/RECOMMENDATIONS:   Plan for treatment: therapy treatment to continue next visit.  Planned interventions for next visit: continue with current treatment.  TBD pending discussion with MD and lymphedema specialist.

## 2020-11-05 ENCOUNTER — HOSPITAL ENCOUNTER (OUTPATIENT)
Dept: RADIOLOGY | Facility: MEDICAL CENTER | Age: 40
End: 2020-11-05
Attending: INTERNAL MEDICINE
Payer: COMMERCIAL

## 2020-11-05 ENCOUNTER — HOSPITAL ENCOUNTER (OUTPATIENT)
Dept: CARDIOLOGY | Facility: MEDICAL CENTER | Age: 40
End: 2020-11-05
Attending: INTERNAL MEDICINE
Payer: COMMERCIAL

## 2020-11-05 DIAGNOSIS — C50.911 MALIGNANT NEOPLASM OF RIGHT FEMALE BREAST, UNSPECIFIED ESTROGEN RECEPTOR STATUS, UNSPECIFIED SITE OF BREAST (HCC): ICD-10-CM

## 2020-11-05 LAB
LV EJECT FRACT  99904: 65
LV EJECT FRACT MOD 2C 99903: 53.64
LV EJECT FRACT MOD 4C 99902: 69.19
LV EJECT FRACT MOD BP 99901: 63.91

## 2020-11-05 PROCEDURE — 93306 TTE W/DOPPLER COMPLETE: CPT

## 2020-11-05 PROCEDURE — 76700 US EXAM ABDOM COMPLETE: CPT

## 2020-11-05 PROCEDURE — 93306 TTE W/DOPPLER COMPLETE: CPT | Mod: 26 | Performed by: INTERNAL MEDICINE

## 2020-11-10 ENCOUNTER — APPOINTMENT (OUTPATIENT)
Dept: PHYSICAL THERAPY | Facility: REHABILITATION | Age: 40
End: 2020-11-10
Attending: INTERNAL MEDICINE
Payer: COMMERCIAL

## 2020-11-16 ENCOUNTER — TELEMEDICINE (OUTPATIENT)
Dept: BEHAVIORAL HEALTH | Facility: CLINIC | Age: 40
End: 2020-11-16
Payer: COMMERCIAL

## 2020-11-16 DIAGNOSIS — Z71.89 GRIEF COUNSELING: ICD-10-CM

## 2020-11-16 DIAGNOSIS — F43.23 ADJUSTMENT DISORDER WITH MIXED ANXIETY AND DEPRESSED MOOD: ICD-10-CM

## 2020-11-16 PROCEDURE — 90834 PSYTX W PT 45 MINUTES: CPT | Mod: 95,CR | Performed by: MARRIAGE & FAMILY THERAPIST

## 2020-11-16 NOTE — BH THERAPY
"     [facility]  Therapy Progress Note      This evaluation was conducted via Zoom using secure and encrypted videoconferencing technology. The patient was in a private location in the [facility].    The patient's identity was confirmed and verbal consent was obtained for this virtual visit.     Patient Name: Tiarra Chavez  Patient MRN: [id number]  Today's Date: 11/16/2020     Type of session:Individual psychotherapy  Length of session: 45 minutes  Persons in attendance:Patient    Subjective/New Info: Ms. Mayen reports that she received a letter from [employer] indicating that she needed to return to work. She also was able to verbalize that she \"was hurtful\" by the notice as it appeared blaming and abrasive.    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     Patient also describes that she has been through chemotherapy, and feeling more exhausted, and overwhelm with what her prognosis will be. She has been having insomnia and it may be contributed to estrogen changes from rx; and possible surgery to remove ovaries. She is contemplating waiting surgery.     Psychoeducation on stress management, relaxation skills, healing and positive thinking/affirmation to encourage mind and body to heal from chemotherapy/surgery.    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     Objective/Observations:   Participation: Active verbal participation and Open to feedback   Grooming: Casual   Cognition: Fully Oriented   Eye contact: Good   Mood: Depressed and Anxious   Affect: Full range and Congruent " "with content   Thought process: Logical   Speech: Rate within normal limits   Other:     Diagnoses:   1. Adjustment disorder with mixed anxiety and depressed mood    2. Grief counseling         Current risk:   SUICIDE: Not applicable   Homicide: Not applicable   Self-harm: Not applicable   Relapse: Not applicable   Other:    Safety Plan reviewed? Not Indicated   If evidence of imminent risk is present, intervention/plan:     Therapeutic Intervention(s): Cognitive modification, Conflict resolution skills, Goal-setting, Interpersonal effectiveness skills, Leisure and recreation skills, Self-care skills, Stressors assessed and Supportive psychotherapy    Treatment Goal(s)/Objective(s) addressed: 100 days of commitments of goal: 1. Daily affirmation/self-care measures 2. Stress management 3. Time-management (mindfulness apps; deep breathing relaxation skills/YouTube visual imagery). 4. Health and fitness (with strategies/activities for each goals to explore in session and out of session.      Progress toward Treatment Goals: Mild improvement    Plan:  - \"Homework\" recommendation: 100 day lifestyle transformation implementation/RUBIN careers exploration such as possible JOSSE.  - Patient is in agreement with the above plan:  YES    RODRÍGUEZ Roy  11/16/2020                                   "

## 2020-11-19 LAB
BASOPHILS # BLD AUTO: 0 X10E3/UL (ref 0–0.2)
BASOPHILS NFR BLD AUTO: 0 %
EOSINOPHIL # BLD AUTO: 0.1 X10E3/UL (ref 0–0.4)
EOSINOPHIL NFR BLD AUTO: 2 %
ERYTHROCYTE [DISTWIDTH] IN BLOOD BY AUTOMATED COUNT: 13.6 % (ref 11.7–15.4)
HCT VFR BLD AUTO: 42.5 % (ref 34–46.6)
HGB BLD-MCNC: 13.9 G/DL (ref 11.1–15.9)
IMM GRANULOCYTES # BLD AUTO: 0 X10E3/UL (ref 0–0.1)
IMM GRANULOCYTES NFR BLD AUTO: 0 %
IMMATURE CELLS  115398: ABNORMAL
LYMPHOCYTES # BLD AUTO: 1.1 X10E3/UL (ref 0.7–3.1)
LYMPHOCYTES NFR BLD AUTO: 25 %
MCH RBC QN AUTO: 33.3 PG (ref 26.6–33)
MCHC RBC AUTO-ENTMCNC: 32.7 G/DL (ref 31.5–35.7)
MCV RBC AUTO: 102 FL (ref 79–97)
MONOCYTES # BLD AUTO: 0.2 X10E3/UL (ref 0.1–0.9)
MONOCYTES NFR BLD AUTO: 5 %
MORPHOLOGY BLD-IMP: ABNORMAL
NEUTROPHILS # BLD AUTO: 3 X10E3/UL (ref 1.4–7)
NEUTROPHILS NFR BLD AUTO: 68 %
NRBC BLD AUTO-RTO: ABNORMAL %
PLATELET # BLD AUTO: 359 X10E3/UL (ref 150–450)
RBC # BLD AUTO: 4.17 X10E6/UL (ref 3.77–5.28)
VIT B12 SERPL-MCNC: 950 PG/ML (ref 232–1245)
WBC # BLD AUTO: 4.4 X10E3/UL (ref 3.4–10.8)

## 2020-11-30 ENCOUNTER — TELEMEDICINE (OUTPATIENT)
Dept: MEDICAL GROUP | Age: 40
End: 2020-11-30
Payer: COMMERCIAL

## 2020-11-30 VITALS — BODY MASS INDEX: 23.95 KG/M2 | HEIGHT: 66 IN | WEIGHT: 149 LBS | TEMPERATURE: 97.3 F

## 2020-11-30 DIAGNOSIS — D75.89 MACROCYTOSIS: ICD-10-CM

## 2020-11-30 DIAGNOSIS — Z17.0 MALIGNANT NEOPLASM OF LOWER-INNER QUADRANT OF RIGHT BREAST OF FEMALE, ESTROGEN RECEPTOR POSITIVE (HCC): ICD-10-CM

## 2020-11-30 DIAGNOSIS — Z00.00 HEALTH CARE MAINTENANCE: ICD-10-CM

## 2020-11-30 DIAGNOSIS — Z00.00 ANNUAL PHYSICAL EXAM: ICD-10-CM

## 2020-11-30 DIAGNOSIS — D51.0 PERNICIOUS ANEMIA: ICD-10-CM

## 2020-11-30 DIAGNOSIS — C50.311 MALIGNANT NEOPLASM OF LOWER-INNER QUADRANT OF RIGHT BREAST OF FEMALE, ESTROGEN RECEPTOR POSITIVE (HCC): ICD-10-CM

## 2020-11-30 DIAGNOSIS — E53.8 B12 DEFICIENCY: ICD-10-CM

## 2020-11-30 DIAGNOSIS — E55.9 HYPOVITAMINOSIS D: ICD-10-CM

## 2020-11-30 PROCEDURE — 99396 PREV VISIT EST AGE 40-64: CPT | Mod: 95,CR | Performed by: INTERNAL MEDICINE

## 2020-11-30 RX ORDER — CYANOCOBALAMIN 1000 UG/ML
INJECTION, SOLUTION INTRAMUSCULAR; SUBCUTANEOUS
Qty: 12 ML | Refills: 3 | Status: SHIPPED | OUTPATIENT
Start: 2020-11-30 | End: 2021-11-02

## 2020-11-30 NOTE — PROGRESS NOTES
Telemedicine Visit: Established Patient     This Remote Face to Face encounter was conducted via Zoom. Given the importance of social distancing and other strategies recommended to reduce the risk of COVID-19 transmission, I am providing medical care to this patient via audio/video visit in place of an in person visit at the request of the patient. Verbal consent to telehealth, risks, benefits, and consequences were discussed. Patient retains the right to withdraw at any time. All existing confidentiality protections apply. The patient has access to all transmitted medical information. No dissemination of any patient images or information to other entities without further written consent.    CHIEF COMPLAINT     Pernicious anemia/labs, general health    HPI  Tiarra Chavez is a 40 y.o. female who presents today for the following     Recommendations:  Regular exercise at least 4 days a week  Diet: advised balanced diet  Dental exam at least 1-2 times per year  Sunscreen use: advised     Immunizations:  TdaP:  advised  Influenza: UTD     GYN  Previous PAP:   normal - due  Abnormal PAP: no  Last Mammography: UTD    Pernicious anemia, B12 deficiency, macrocytosis  Dg: in 2015   Denies:  - Anorexia, sweating, pallor  - Extremity numbness or tingling  Rx: B12 injections Q 7 days                      FH: neg  Reviewed labs.       Hypovitaminosis D  The patient had low vitamin D level.  Vitamin D supplement:  now 79226 U QD, was on high-dose vitamin D.     Right breast invasive ductal adenocarcinoma, ER/KS positive  Interval   - stopped anastrazole due to side effects  - start tamoxifen in 1/2021    Background  41 y/o, F  Primary dg: Breast cancer stage 1B  · TNM pT3, PN0, cM0, [place] ID G2  · Er/Pr (+)  · HER 2 protein overexpression: equivocal; HER2 Gene amplification: (-)  · BRCA (-)     Stage 1B right breast invasive ductal carcinoma  3/26/2020:   - S/p bilateral mastectomy and lymph node dissection on the right  side              - showed 7 multiply 4.5 mL replied 3 cm, grade 2, margins uninvolved              - distance from closest margin 3 mm from the superficial throat/anterior margin  -7 lymph nodes benign; lymphovascular invasion present  -ER 90%, UT 90%, Ki 67 10-15%.     PET scan, 2/24/2020:  Patchy uptake in the sternum, most in the manubrio-sternal junction/sternal angle. No change is seen on correlate CT. This is indeterminant and follow-up is recommended.     CT chest, abdomen, pelvis, 2/12/20   1. Nonspecific 1.1 cm low-attenuation mass in the left hepatic lobe could be cysts or hemangioma. Metastatic disease cannot be entirely excluded. Further evaluation with MRI recommended.  2. No suspicious pulmonary nodule.     MRI abdomen, 3/13/20:  1.1 cm lesion in the LT lobe consistent with hemangioma     Treatment:  - St post radiotherapy.  - Ongoing chemotherapy, 4 cycles every 3 weeks.  - Tamoxifen  - F/u by oncology Dr Hahn.     Dg:  bx, 1/6//2020 -pathology  A. Right breast:          Invasive ductal carcinoma, grade 2.          Tumor measures 7.0 x 4.5 x 3.0 cm.          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          All surgical margins of resection, uninvolved by in situ and           invasive carcinoma.          Skin and nipple without histopathologic abnormality, uninvolved           by tumor.     B. Right axillary lymph node:          Four lymph nodes, negative for metastatic carcinoma (0/4).   C. Right axillary tissue:          Three lymph nodes, negative for metastatic carcinoma (0/3).   D. Left breast:          Fibroglandular breast tissue demonstrating a hyalinized           fibroadenoma (1.5 cm) with biopsy site changes, focus of           atypical lobular hyperplasia (ALH) and fibrocystic change           including apocrine metaplasia and adenosis.          Skin and nipple without  histopathologic abnormality.          No in situ or invasive carcinoma identified.     Synoptic Report for Invasive Carcinoma of the Breast:          -Procedure: Total mastectomy        -Specimen Laterality: Right        -Tumor Site: Upper inner to upper outer quadrants        -Tumor Size: 7.0 x 4.5 x 3.0 cm        -Histologic Type: Invasive ductal carcinoma        -Histologic Grade ([place] Histologic Score):         Glandular (Acinar)/Tubular Differentiation: Score 3         Nuclear Pleomorphism: Score 2         Mitotic Rate: Score 2         Overall Grade: 2        -Tumor Focality: Single focus        -Ductal Carcinoma In Situ (DCIS): Present, intermediate grade,         solid type with comedo-type necrosis admixed with invasive         component and separate focus in the retroareolar area        -Lobular Carcinoma In Situ (LCIS): No LCIS in specimen        -Tumor Extension: Not applicable        -Margins:         Invasive Carcinoma Margins: Uninvolved by invasive carcinoma          Distance from closest margin: 3 mm from the superficial/anterior         margin         DCIS Margins: Uninvolved by DCIS          Distance from closest margin: Cannot be determined but is at         least greater than 2 mm from all margins        -Regional Lymph Nodes: All nodes negative         Total number of lymph nodes examined: 7         Number of sentinel lymph node: 4        -Treatment Effect: No known presurgical therapy        -Lymph-Vascular Invasion: Present        -Pathologic Staging:         Primary Tumor: pT3         Regional Lymph Nodes: pN0(sn)         Distant Metastasis: Not applicable        -Additional Pathologic Findings: Fibrocystic change, fibroadenoma         and focal ALH (right breast)        -Ancillary Studies: Performed on previous core biopsy, case         [id number] from 01/06/2020:         Estrogen Receptor (ER): Positive, moderate to strong, 90%         Progesterone Receptor (PgR): Positive, moderate to  strong, 90%         Ki-67:   10-15%         HER2:         Immunoperoxidase Studies: Equivocal (2+)         In Situ Hybridization for HER2: Negative        -Microcalcifications: Present, predominantly associated with DCIS    Reviewed PMH, PSH, FH, SH, ALL, HCM/IMM, no changes  Reviewed MEDS, no changes    Patient Active Problem List    Diagnosis Date Noted   • Adjustment disorder with mixed anxiety and depressed mood 10/05/2020   • Grief counseling 10/05/2020   • Malignant neoplasm of lower-inner quadrant of right female breast (HCC) 03/27/2020   • Hypovitaminosis D 10/30/2019   • Macrocytosis 04/26/2019   • Macromastia 04/26/2019   • Pernicious anemia 12/20/2017   • Health care maintenance 10/25/2017     CURRENT MEDICATIONS  Current Outpatient Medications   Medication Sig Dispense Refill   • vitamin D, Ergocalciferol, (DRISDOL) 1.25 MG (98743 UT) Cap capsule Take 1 capsule by mouth once a week 12 Cap 0   • tramadol (ULTRAM) 50 MG Tab TAKE 1 TABLET BY MOUTH EVERY 6 HOURS AS NEEDED FOR 5 DAYS     • Acetaminophen (TYLENOL 8 HOUR PO) Take  by mouth.     • Multiple Vitamin (MULTI-VITAMIN DAILY PO) Take  by mouth every day.     • Cyanocobalamin (VITAMIN B 12 PO) Take 1 Tab by mouth every day.     • folic acid (FOLVITE) 1 MG TABS Take 1 mg by mouth every day.       No current facility-administered medications for this visit.      ALLERGIES  Allergies: Shellfish allergy and Nickel  PAST MEDICAL HISTORY  Past Medical History:   Diagnosis Date   • Cancer ([facility]) 2020    breast right   • Allergic rhinitis 4/7/2011   • Headache(784.0)     When in school, not a problem now.  With sleep deprivation.   • Pernicious anemia    • Snoring    • Urinary incontinence     minor     SURGICAL HISTORY  She  has a past surgical history that includes lumpectomy (2001); vaginal perineal exam repair (5/14/2012); other (Left, 1981); pr mastectomy, simple, complete (Bilateral, 3/26/2020); node biopsy sentinel (Right, 3/26/2020); and breast biopsy  "(Right, 2020).  SOCIAL HISTORY  Social History     Tobacco Use   • Smoking status: Never Smoker   • Smokeless tobacco: Never Used   Substance Use Topics   • Alcohol use: Never     Frequency: Never   • Drug use: No     Social History     Social History Narrative   • Not on file     FAMILY HISTORY  Family History   Problem Relation Age of Onset   • Hypertension Mother    • Thyroid Mother         Hypothyroid   • Hyperlipidemia Father    • Cancer Father         T cell lymphoma   • Heart Disease Maternal Grandfather         MI 38 yo, unknown RF.   • Cancer Paternal Uncle         lymphoma     Family Status   Relation Name Status   • Mo Hyperthyroidism Alive        59yo   • Fa     • MGFa  (Not Specified)   • PUnc  (Not Specified)     ROS   Constitutional: Negative for fever, chills.  HENT: Negative for congestion, sore throat.  Eyes: Negative for vision problems.   Respiratory: Negative for cough, shortness of breath.  Cardiovascular: Negative for chest pain, palpitations.   Gastrointestinal: Negative for heartburn, nausea, abdominal pain.   Genitourinary: Negative for dysuria.  Musculoskeletal: Negative for significant myalgia, back and joint pain.   Skin: Negative for rash.   Neuro: Negative for dizziness, weakness and headaches.   Endo/Heme/Allergies: Does not bruise/bleed easily.   Psychiatric/Behavioral: Negative for depression.    Objective   Vitals obtained by patient:  Temperature 36.3 °C (97.3 °F)   Height 1.676 m (5' 6\")   Weight 67.6 kg (149 lb)   Body Mass Index 24.05 kg/m²   Physical Exam:  Constitutional: Alert, no distress, well-groomed.  Skin: No rash in visible areas.  Eye: Round. Conjunctiva clear, lids normal.  ENMT: Lips pink without lesions, good dentition. Phonation normal.  Neck: No visible masses or thyromegaly. Moves freely without pain.  CV: no peripheral cyanosis, tachycardia.  Respiratory: Unlabored respiratory effort, no cough or audible wheezing.  Psych: Alert and oriented x3, " normal affect and mood.     Labs     Labs are reviewed and discussed with a patient  Lab Results   Component Value Date/Time    CHOLSTRLTOT 166 07/03/2018 07:53 AM    CHOLSTRLTOT 145 03/16/2010 08:20 AM    LDL 87 07/03/2018 07:53 AM    LDL 71 03/16/2010 08:20 AM    HDL 59 07/03/2018 07:53 AM    HDL 59 03/16/2010 08:20 AM    TRIGLYCERIDE 98 07/03/2018 07:53 AM    TRIGLYCERIDE 76 03/16/2010 08:20 AM       Lab Results   Component Value Date/Time    SODIUM 139 07/03/2018 07:53 AM    SODIUM 131 (L) 05/20/2012 03:00 AM    POTASSIUM 4.0 07/03/2018 07:53 AM    POTASSIUM 3.7 05/20/2012 03:00 AM    CHLORIDE 101 07/03/2018 07:53 AM    CHLORIDE 100 05/20/2012 03:00 AM    CO2 23 07/03/2018 07:53 AM    CO2 21 05/20/2012 03:00 AM    GLUCOSE 87 07/03/2018 07:53 AM    GLUCOSE 109 (H) 05/20/2012 03:00 AM    BUN 14 07/03/2018 07:53 AM    BUN 7 (L) 05/20/2012 03:00 AM    CREATININE 0.77 07/03/2018 07:53 AM    CREATININE 0.67 05/20/2012 03:00 AM    CREATININE 0.80 03/16/2010 08:20 AM    BUNCREATRAT 18 07/03/2018 07:53 AM    BUNCREATRAT 18 03/16/2010 08:20 AM    GLOMRATE >59 03/16/2010 08:20 AM     Lab Results   Component Value Date/Time    ALKPHOSPHAT 64 07/03/2018 07:53 AM    ALKPHOSPHAT 86 05/20/2012 03:00 AM    ASTSGOT 16 07/03/2018 07:53 AM    ASTSGOT 27 05/20/2012 03:00 AM    ALTSGPT 10 07/03/2018 07:53 AM    ALTSGPT 39 05/20/2012 03:00 AM    TBILIRUBIN 0.7 07/03/2018 07:53 AM    TBILIRUBIN 0.9 05/20/2012 03:00 AM      Lab Results   Component Value Date/Time    HBA1C 5.0 09/25/2019 01:24 PM     Lab Results   Component Value Date/Time    TSH 3.150 09/25/2019 01:24 PM    TSH 1.350 07/03/2018 07:53 AM       Lab Results   Component Value Date/Time    WBC 4.4 11/18/2020 04:12 AM    WBC 9.0 03/27/2020 04:17 AM    WBC 4.4 03/16/2010 08:20 AM    RBC 4.17 11/18/2020 04:12 AM    RBC 2.50 (L) 03/27/2020 04:17 AM    RBC 4.26 03/16/2010 08:20 AM    HEMOGLOBIN 13.9 11/18/2020 04:12 AM    HEMOGLOBIN 8.8 (L) 03/27/2020 04:17 AM    HEMATOCRIT 42.5  11/18/2020 04:12 AM    HEMATOCRIT 26.9 (L) 03/27/2020 04:17 AM     (H) 11/18/2020 04:12 AM    .6 (H) 03/27/2020 04:17 AM     (H) 03/16/2010 08:20 AM    MCH 33.3 (H) 11/18/2020 04:12 AM    MCH 35.2 (H) 03/27/2020 04:17 AM    MCH 35.0 (H) 03/16/2010 08:20 AM    MCHC 32.7 11/18/2020 04:12 AM    MCHC 32.7 (L) 03/27/2020 04:17 AM    MPV 9.3 03/27/2020 04:17 AM    NEUTSPOLYS 68 11/18/2020 04:12 AM    NEUTSPOLYS 69.60 06/15/2016 05:23 PM    LYMPHOCYTES 25 11/18/2020 04:12 AM    LYMPHOCYTES 20.40 (L) 06/15/2016 05:23 PM    MONOCYTES 5 11/18/2020 04:12 AM    MONOCYTES 7.70 06/15/2016 05:23 PM    EOSINOPHILS 2 11/18/2020 04:12 AM    EOSINOPHILS 1.20 06/15/2016 05:23 PM    BASOPHILS 0 11/18/2020 04:12 AM    BASOPHILS 0.60 06/15/2016 05:23 PM      Imaging      Reviewed and discussed per HPI    Assessment and Plan     Tiarra Chavez is a 40 y.o. female    1. Annual physical exam  Reviewed PMH, PSH, FH, SH, ALL, MEDS, HCM/IMM.   Advised healthy habits, diet, exercise.    2. Health care maintenance  Per HPI    3. Pernicious anemia  Controlled, continue with current treatment.  - cyanocobalamin (VITAMIN B-12) 1000 MCG/ML Solution; INSERT 1 ML BY INTRAMUSCULAR ROUTE EVERY 7 DAYS  Dispense: 12 mL; Refill: 3  4. B12 deficiency  Controlled, continue with current treatment.  - CBC WITH DIFFERENTIAL; Future  - VIT B12,  FOLIC ACID  5. Macrocytosis  - cyanocobalamin (VITAMIN B-12) 1000 MCG/ML Solution; INSERT 1 ML BY INTRAMUSCULAR ROUTE EVERY 7 DAYS  Dispense: 12 mL; Refill: 3    6. Hypovitaminosis D  Pending labs, continue current supplement  - VITAMIN D,25 HYDROXY; Future    7. Malignant neoplasm of lower-inner quadrant of right breast of female, estrogen receptor positive (HCC)  No recurrence, continue oncology follow-up    Follow-up: In 3 months with labs

## 2020-12-02 ENCOUNTER — TELEPHONE (OUTPATIENT)
Dept: MEDICAL GROUP | Age: 40
End: 2020-12-02

## 2020-12-02 ENCOUNTER — OFFICE VISIT (OUTPATIENT)
Dept: MEDICAL GROUP | Age: 40
End: 2020-12-02
Payer: COMMERCIAL

## 2020-12-02 VITALS
WEIGHT: 149 LBS | HEART RATE: 76 BPM | DIASTOLIC BLOOD PRESSURE: 76 MMHG | HEIGHT: 66 IN | OXYGEN SATURATION: 97 % | BODY MASS INDEX: 23.95 KG/M2 | RESPIRATION RATE: 12 BRPM | TEMPERATURE: 96.4 F | SYSTOLIC BLOOD PRESSURE: 118 MMHG

## 2020-12-02 DIAGNOSIS — G47.9 SLEEP DISORDER: ICD-10-CM

## 2020-12-02 DIAGNOSIS — Z92.21 HISTORY OF CHEMOTHERAPY: ICD-10-CM

## 2020-12-02 DIAGNOSIS — R53.83 FATIGUE, UNSPECIFIED TYPE: ICD-10-CM

## 2020-12-02 DIAGNOSIS — R00.2 PALPITATIONS: ICD-10-CM

## 2020-12-02 PROCEDURE — 93000 ELECTROCARDIOGRAM COMPLETE: CPT | Performed by: INTERNAL MEDICINE

## 2020-12-02 PROCEDURE — 99214 OFFICE O/P EST MOD 30 MIN: CPT | Performed by: INTERNAL MEDICINE

## 2020-12-02 NOTE — PROGRESS NOTES
CHIEF COMPLAINT  Palpitations, EKG    HPI  Tiarra Chavez is a 40 y.o. female who presents today for the following     Palpitations, recent chemotherapy, fatigue  40-year-old, female, with history of recent chemotherapy due to newly diagnosed breast cancer:  - Taxotere from 2020 - 2020 (finished 5 months ago)  - Trelstar started 8/10/2020 - started 4 months ago    She complains of intermittent and not daily palpitations in the last 4 months, with the last episode approximately 1 month ago.  Since she started chemotherapy, complains of fatigue;  -Because of that, oncologist ordered echocardiography, done on 2020, that came back negative:  No prior study is available for comparison.   Left ventricular ejection fraction is visually estimated to be 65%.  Normal regional wall motion.  No significant valve disease or flow abnormalities.     She has not have:  -Chest pain or pressure  -Lightheadedness, dizziness  -Shortness of breath  -Weakness    Sleep disorder  At the same time, complaints of waking multiple times during the night, usually after midnight.  Going to bed at 7.30  Awakin, 2 4 am  Accompanied with snoring,  daytime sleepinss, chronic fatigue since she started chemotherapy.  No gasping for air.  BMI: 24.    She requested sleep study.    Reviewed PMH, PSH, FH, SH, ALL, HCM/IMM, no changes  Reviewed MEDS, no changes    Patient Active Problem List    Diagnosis Date Noted   • B12 deficiency 2020   • Adjustment disorder with mixed anxiety and depressed mood 10/05/2020   • Grief counseling 10/05/2020   • Malignant neoplasm of lower-inner quadrant of right female breast (HCC) 2020   • Hypovitaminosis D 10/30/2019   • Macrocytosis 2019   • Macromastia 2019   • Pernicious anemia 2017   • Health care maintenance 10/25/2017     CURRENT MEDICATIONS  Current Outpatient Medications   Medication Sig Dispense Refill   • cyanocobalamin (VITAMIN B-12) 1000 MCG/ML Solution INSERT 1  ML BY INTRAMUSCULAR ROUTE EVERY 7 DAYS 12 mL 3   • Acetaminophen (TYLENOL 8 HOUR PO) Take  by mouth.     • Multiple Vitamin (MULTI-VITAMIN DAILY PO) Take  by mouth every day.     • Cyanocobalamin (VITAMIN B 12 PO) Take 1 Tab by mouth every day.     • folic acid (FOLVITE) 1 MG TABS Take 1 mg by mouth every day.       No current facility-administered medications for this visit.      ALLERGIES  Allergies: Shellfish allergy and Nickel  PAST MEDICAL HISTORY  Past Medical History:   Diagnosis Date   • Cancer (HCC) 2020    breast right   • Allergic rhinitis 2011   • Headache(784.0)     When in school, not a problem now.  With sleep deprivation.   • Pernicious anemia    • Snoring    • Urinary incontinence     minor     SURGICAL HISTORY  She  has a past surgical history that includes lumpectomy (); vaginal perineal exam repair (2012); other (Left, ); pr mastectomy, simple, complete (Bilateral, 3/26/2020); node biopsy sentinel (Right, 3/26/2020); and breast biopsy (Right, 2020).  SOCIAL HISTORY  Social History     Tobacco Use   • Smoking status: Never Smoker   • Smokeless tobacco: Never Used   Substance Use Topics   • Alcohol use: Never     Frequency: Never   • Drug use: No     Social History     Social History Narrative   • Not on file     FAMILY HISTORY  Family History   Problem Relation Age of Onset   • Hypertension Mother    • Thyroid Mother         Hypothyroid   • Hyperlipidemia Father    • Cancer Father         T cell lymphoma   • Heart Disease Maternal Grandfather         MI 38 yo, unknown RF.   • Cancer Paternal Uncle         lymphoma     Family Status   Relation Name Status   • Mo Hyperthyroidism Alive        59yo   • Fa     • MGFa  (Not Specified)   • PUnc  (Not Specified)     ROS   Constitutional: Negative for fever, chills.  HENT: Negative for congestion, sore throat.  Eyes: Negative for vision problems.   Respiratory: Negative for cough, shortness of breath.  Cardiovascular: As  above.  Gastrointestinal: Negative for heartburn, nausea, abdominal pain.   Genitourinary: Negative for dysuria.  Musculoskeletal: Negative for significant myalgia, back and joint pain.   Skin: Negative for rash.   Neuro: Negative for dizziness, weakness and headaches.   Endo/Heme/Allergies: Does not bruise/bleed easily.   Psychiatric/Behavioral: Negative for depression.    PHYSICAL EXAM   Blood Pressure 118/76 (BP Location: Right arm)   Pulse 76   Temperature (Abnormal) 35.8 °C (96.4 °F)   Respiration 12   Oxygen Saturation 97%  There is no height or weight on file to calculate BMI.  General:  NAD, well appearing  HEENT:   NC/AT, PERRLA, EOMI.  Cardiovascular: unlabored breathing, no peripheral cyanosis or swelling.     Lungs:   no respiratory distress.  Abdomen: non- distended.  Extremities:  No LE swelling.  Skin:  Warm, dry.  No erythema. No rash.   Neurologic: Alert & oriented x 3. CN II-XII grossly intact. No focal deficits.  Psychiatric:  Affect normal, mood normal, judgment normal.    EKG   By my interpretation today:  Rhythm: normal sinus   Rate: normal   Axis: normal   Ectopy: none   Conduction: normal   ST Segments: no acute change   T Waves: no acute change   Q Waves: none     Labs     Labs are reviewed and discussed with a patient  Lab Results   Component Value Date/Time    CHOLSTRLTOT 166 07/03/2018 07:53 AM    CHOLSTRLTOT 145 03/16/2010 08:20 AM    LDL 87 07/03/2018 07:53 AM    LDL 71 03/16/2010 08:20 AM    HDL 59 07/03/2018 07:53 AM    HDL 59 03/16/2010 08:20 AM    TRIGLYCERIDE 98 07/03/2018 07:53 AM    TRIGLYCERIDE 76 03/16/2010 08:20 AM       Lab Results   Component Value Date/Time    SODIUM 139 07/03/2018 07:53 AM    SODIUM 131 (L) 05/20/2012 03:00 AM    POTASSIUM 4.0 07/03/2018 07:53 AM    POTASSIUM 3.7 05/20/2012 03:00 AM    CHLORIDE 101 07/03/2018 07:53 AM    CHLORIDE 100 05/20/2012 03:00 AM    CO2 23 07/03/2018 07:53 AM    CO2 21 05/20/2012 03:00 AM    GLUCOSE 87 07/03/2018 07:53 AM     GLUCOSE 109 (H) 05/20/2012 03:00 AM    BUN 14 07/03/2018 07:53 AM    BUN 7 (L) 05/20/2012 03:00 AM    CREATININE 0.77 07/03/2018 07:53 AM    CREATININE 0.67 05/20/2012 03:00 AM    CREATININE 0.80 03/16/2010 08:20 AM    BUNCREATRAT 18 07/03/2018 07:53 AM    BUNCREATRAT 18 03/16/2010 08:20 AM    GLOMRATE >59 03/16/2010 08:20 AM     Lab Results   Component Value Date/Time    ALKPHOSPHAT 64 07/03/2018 07:53 AM    ALKPHOSPHAT 86 05/20/2012 03:00 AM    ASTSGOT 16 07/03/2018 07:53 AM    ASTSGOT 27 05/20/2012 03:00 AM    ALTSGPT 10 07/03/2018 07:53 AM    ALTSGPT 39 05/20/2012 03:00 AM    TBILIRUBIN 0.7 07/03/2018 07:53 AM    TBILIRUBIN 0.9 05/20/2012 03:00 AM      Lab Results   Component Value Date/Time    HBA1C 5.0 09/25/2019 01:24 PM     Lab Results   Component Value Date/Time    TSH 3.150 09/25/2019 01:24 PM    TSH 1.350 07/03/2018 07:53 AM     Lab Results   Component Value Date/Time    WBC 4.4 11/18/2020 04:12 AM    WBC 9.0 03/27/2020 04:17 AM    WBC 4.4 03/16/2010 08:20 AM    RBC 4.17 11/18/2020 04:12 AM    RBC 2.50 (L) 03/27/2020 04:17 AM    RBC 4.26 03/16/2010 08:20 AM    HEMOGLOBIN 13.9 11/18/2020 04:12 AM    HEMOGLOBIN 8.8 (L) 03/27/2020 04:17 AM    HEMATOCRIT 42.5 11/18/2020 04:12 AM    HEMATOCRIT 26.9 (L) 03/27/2020 04:17 AM     (H) 11/18/2020 04:12 AM    .6 (H) 03/27/2020 04:17 AM     (H) 03/16/2010 08:20 AM    MCH 33.3 (H) 11/18/2020 04:12 AM    MCH 35.2 (H) 03/27/2020 04:17 AM    MCH 35.0 (H) 03/16/2010 08:20 AM    MCHC 32.7 11/18/2020 04:12 AM    MCHC 32.7 (L) 03/27/2020 04:17 AM    MPV 9.3 03/27/2020 04:17 AM    NEUTSPOLYS 68 11/18/2020 04:12 AM    NEUTSPOLYS 69.60 06/15/2016 05:23 PM    LYMPHOCYTES 25 11/18/2020 04:12 AM    LYMPHOCYTES 20.40 (L) 06/15/2016 05:23 PM    MONOCYTES 5 11/18/2020 04:12 AM    MONOCYTES 7.70 06/15/2016 05:23 PM    EOSINOPHILS 2 11/18/2020 04:12 AM    EOSINOPHILS 1.20 06/15/2016 05:23 PM    BASOPHILS 0 11/18/2020 04:12 AM    BASOPHILS 0.60 06/15/2016 05:23 PM       Imaging     Reviewed and discussed echocardiography per HPI    Assessment and Plan     Tiarra Chavez is a 40 y.o. female    1. Palpitations  EKG was unremarkable, echocardiography negative pending holter  - Cardiac Event Monitor; Future  - REFERRAL TO CARDIOLOGY    2. History of chemotherapy  Trelstar may give arrhythmia, advised to discussed with hematology    3. Fatigue, unspecified type  Probably due to chemotherapy may improve by time  Reviewed and discussed labs    4. Sleep disorder  Requested referral  - REFERRAL TO PULMONARY AND SLEEP MEDICINE    Counseling:   - Smoking:  Nonsmoker    Followup: In 6 months and as needed    All questions are answered.    Please note that this dictation was created using voice recognition software, and that there might be errors of lio and possibly content.

## 2020-12-07 NOTE — BH THERAPY
" [facility]  Therapy Progress Note      This evaluation was conducted via Zoom using secure and encrypted videoconferencing technology. The patient was in a private location in the [facility].  The patient's identity was confirmed and verbal consent was obtained for this virtual visit.     Patient Name: Tiarra Chavez  Patient MRN: [id number]  Today's Date: 12/10/2020     Type of session:Individual psychotherapy  Length of session: 45 minutes  Persons in attendance:Patient    Subjective/New Info: Ms. Mayen reports that her health has not been well, and her heart rate is higher than normal as the medication is suppose to help with her estrogen levels. She also indicates that she has been under a lot of stress waiting for her informal \"grievences\" for her work related schedule. 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 Currently, at [employer] on  disability continues to pay her; and ran out of leave at the [employer] since August 2020 and not being paid.     Ask for reasonable accommodations that she applied in Oct; since May leave w/o pay.   Role play requests and accommodations processing top three: legal fees paid, back pay since May, telehealth work from home, and punitive damages. Encouragment to hold back on other requests later or bring up other requests if mediators ask for other requests, such as recommended by her colleagues the additional training.  120 days, apology letter, enforcement.    She also openly verbalize practicing meditation, and positive affirmations, and managing her frustrations and avoid becoming easily angered with her Son over \"silly things.\"    Objective/Observations:   Participation: Active verbal participation, Engaged and Open to feedback   Grooming: Casual   Cognition: Fully Oriented   Eye contact: Good   Mood: Depressed and Anxious   Affect: Full range and Congruent with content   Thought process: " "Logical   Speech: Rate within normal limits   Other:     Diagnoses:   1. Adjustment disorder with mixed anxiety and depressed mood    2. Grief counseling         Current risk:   SUICIDE: Not applicable   Homicide: Not applicable   Self-harm: Not applicable   Relapse: Not applicable   Other:    Safety Plan reviewed? Not Indicated   If evidence of imminent risk is present, intervention/plan:     Therapeutic Intervention(s): Cognitive modification, Conflict resolution skills, Distress tolerance skills, Goal-setting, Interpersonal effectiveness skills, Leisure and recreation skills, Parenting skills, Role-playing, Self-care skills, Stressors assessed, Supportive psychotherapy and Therapeutic storytelling    Treatment Goal(s)/Objective(s) addressed: stress management, assertive communication and conflict of resolution skills and coping with work related issues.     Progress toward Treatment Goals: Mild improvement    Plan:  - \"Homework\" recommendation: managing her frustrations and avoid becoming easily angered with her Son over \"silly things.\"  - Patient is in agreement with the above plan:  YES    RODRÍGUEZ Roy  12/10/2020                            "

## 2020-12-09 ENCOUNTER — TELEMEDICINE (OUTPATIENT)
Dept: BEHAVIORAL HEALTH | Facility: CLINIC | Age: 40
End: 2020-12-09
Payer: COMMERCIAL

## 2020-12-09 DIAGNOSIS — Z71.89 GRIEF COUNSELING: ICD-10-CM

## 2020-12-09 DIAGNOSIS — F43.23 ADJUSTMENT DISORDER WITH MIXED ANXIETY AND DEPRESSED MOOD: ICD-10-CM

## 2020-12-09 PROCEDURE — 90834 PSYTX W PT 45 MINUTES: CPT | Mod: 95,CR | Performed by: MARRIAGE & FAMILY THERAPIST

## 2020-12-16 ENCOUNTER — OFFICE VISIT (OUTPATIENT)
Dept: CARDIOLOGY | Facility: MEDICAL CENTER | Age: 40
End: 2020-12-16
Payer: COMMERCIAL

## 2020-12-16 VITALS
WEIGHT: 155 LBS | HEIGHT: 66 IN | OXYGEN SATURATION: 96 % | HEART RATE: 76 BPM | DIASTOLIC BLOOD PRESSURE: 68 MMHG | BODY MASS INDEX: 24.91 KG/M2 | SYSTOLIC BLOOD PRESSURE: 120 MMHG

## 2020-12-16 DIAGNOSIS — R00.2 PALPITATIONS: ICD-10-CM

## 2020-12-16 LAB — EKG IMPRESSION: NORMAL

## 2020-12-16 PROCEDURE — 99203 OFFICE O/P NEW LOW 30 MIN: CPT | Performed by: INTERNAL MEDICINE

## 2020-12-16 PROCEDURE — 93000 ELECTROCARDIOGRAM COMPLETE: CPT | Performed by: INTERNAL MEDICINE

## 2020-12-16 RX ORDER — DEXAMETHASONE 4 MG/1
TABLET ORAL
COMMUNITY
Start: 2020-11-03 | End: 2020-12-16

## 2020-12-16 RX ORDER — ANASTROZOLE 1 MG/1
1 TABLET ORAL
COMMUNITY
Start: 2020-11-13 | End: 2020-12-16

## 2020-12-16 RX ORDER — TAMOXIFEN CITRATE 20 MG/1
20 TABLET ORAL
COMMUNITY
Start: 2020-11-17

## 2020-12-16 RX ORDER — OMEPRAZOLE 20 MG/1
20 CAPSULE, DELAYED RELEASE ORAL
COMMUNITY
Start: 2020-12-01 | End: 2020-12-16

## 2020-12-16 ASSESSMENT — ENCOUNTER SYMPTOMS
PND: 0
WEAKNESS: 1
CLAUDICATION: 0
DIARRHEA: 1
CONSTIPATION: 1
INSOMNIA: 1
BRUISES/BLEEDS EASILY: 0
BLURRED VISION: 1
HEADACHES: 1
NECK PAIN: 1
ORTHOPNEA: 1
FEVER: 0
NERVOUS/ANXIOUS: 1
NAUSEA: 1
MEMORY LOSS: 1
PHOTOPHOBIA: 1
DIZZINESS: 0
COUGH: 0
VOMITING: 1
SORE THROAT: 0
ABDOMINAL PAIN: 0
PALPITATIONS: 1
FALLS: 1
CHILLS: 0
FOCAL WEAKNESS: 0
SHORTNESS OF BREATH: 1

## 2020-12-16 NOTE — PROGRESS NOTES
"Chief Complaint   Patient presents with   • New Patient     Palpitations       Subjective:   Tiarra Chavez is a 40 y.o. female who presents today in consultation from Dr. Beatirs Pathak M.D.  For evaluation of report of palpitations.  She had breast cancer and had mastectomy then chemotherapy with Taxotere and Cytoxan but has noticed increase in her resting heart rate she has a fit bit she is having some readings at typically heart rates were in the 60s with breast but she noticed with less activity heart rates can be in the 80s and 90s has less ability to exercise.  No worrisome symptoms such as presyncope or syncope she had an echocardiogram and November which was normal.    She works as a pharmacist at the [employer]    No known Covid exposures she has never been tested    Past Medical History:   Diagnosis Date   • Allergic rhinitis 4/7/2011   • Cancer (HCC) 2020    breast right   • Headache(784.0)     When in school, not a problem now.  With sleep deprivation.   • Pernicious anemia    • Snoring    • Urinary incontinence     minor     Past Surgical History:   Procedure Laterality Date   • PB MASTECTOMY, SIMPLE, COMPLETE Bilateral 3/26/2020    Procedure: MASTECTOMY;  Surgeon: Sophy Holbrook M.D.;  Location: SURGERY [facility];  Service: General   • NODE BIOPSY SENTINEL Right 3/26/2020    Procedure: BIOPSY, LYMPH NODE, SENTINEL;  Surgeon: Sophy Holbrook M.D.;  Location: SURGERY [facility];  Service: General   • BREAST BIOPSY Right 01/06/2020    Rt Breast Bx   • VAGINAL PERINEAL EXAM REPAIR  5/14/2012    Performed by HUSSAIN RANGEL at LABOR AND DELIVERY   • LUMPECTOMY  2001    [facility]   • OTHER Left 1981    laceration repair  left leg     Family History   Problem Relation Age of Onset   • Hypertension Mother    • Thyroid Mother         Hypothyroid   • Hyperlipidemia Father    • Cancer Father         T cell lymphoma   • Heart Disease Maternal Grandfather         MI 38 yo, unknown RF.   • " Cancer Paternal Uncle         lymphoma     Social History     Socioeconomic History   • Marital status: Single     Spouse name: Not on file   • Number of children: Not on file   • Years of education: Not on file   • Highest education level: Not on file   Occupational History   • Not on file   Social Needs   • Financial resource strain: Not on file   • Food insecurity     Worry: Not on file     Inability: Not on file   • Transportation needs     Medical: Not on file     Non-medical: Not on file   Tobacco Use   • Smoking status: Never Smoker   • Smokeless tobacco: Never Used   Substance and Sexual Activity   • Alcohol use: Never     Frequency: Never   • Drug use: Yes     Comment: CBD once a month as needed   • Sexual activity: Not Currently     Partners: Male     Birth control/protection: Condom, Pill   Lifestyle   • Physical activity     Days per week: Not on file     Minutes per session: Not on file   • Stress: Not on file   Relationships   • Social connections     Talks on phone: Not on file     Gets together: Not on file     Attends Temple service: Not on file     Active member of club or organization: Not on file     Attends meetings of clubs or organizations: Not on file     Relationship status: Not on file   • Intimate partner violence     Fear of current or ex partner: Not on file     Emotionally abused: Not on file     Physically abused: Not on file     Forced sexual activity: Not on file   Other Topics Concern   • Not on file   Social History Narrative   • Not on file     Allergies   Allergen Reactions   • Shellfish Allergy Hives   • Nickel Itching     rash     Outpatient Encounter Medications as of 12/16/2020   Medication Sig Dispense Refill   • tamoxifen (NOLVADEX) 20 MG tablet Take 20 mg by mouth.     • cyanocobalamin (VITAMIN B-12) 1000 MCG/ML Solution INSERT 1 ML BY INTRAMUSCULAR ROUTE EVERY 7 DAYS 12 mL 3   • Multiple Vitamin (MULTI-VITAMIN DAILY PO) Take  by mouth every day.     • Cyanocobalamin  "(VITAMIN B 12 PO) Take 1 Tab by mouth every day.     • folic acid (FOLVITE) 1 MG TABS Take 1 mg by mouth every day.     • [DISCONTINUED] anastrozole (ARIMIDEX) 1 MG Tab Take 1 mg by mouth.     • [DISCONTINUED] dexamethasone (DECADRON) 4 MG Tab TAKE 1 2 (ONE HALF) TABLET BY MOUTH TWICE DAILY     • [DISCONTINUED] omeprazole (PRILOSEC) 20 MG delayed-release capsule Take 20 mg by mouth.     • [DISCONTINUED] Acetaminophen (TYLENOL 8 HOUR PO) Take  by mouth.       No facility-administered encounter medications on file as of 12/16/2020.      Review of Systems   Constitutional: Positive for malaise/fatigue. Negative for chills and fever.   HENT: Negative for sore throat.    Eyes: Positive for blurred vision and photophobia.   Respiratory: Positive for shortness of breath. Negative for cough.    Cardiovascular: Positive for chest pain, palpitations and orthopnea. Negative for claudication, leg swelling and PND.   Gastrointestinal: Positive for constipation, diarrhea, nausea and vomiting. Negative for abdominal pain.   Musculoskeletal: Positive for falls, joint pain and neck pain.   Skin: Positive for itching and rash.   Neurological: Positive for weakness and headaches. Negative for dizziness and focal weakness.   Endo/Heme/Allergies: Does not bruise/bleed easily.   Psychiatric/Behavioral: Positive for memory loss. The patient is nervous/anxious and has insomnia.         Objective:   /68 (BP Location: Left arm, Patient Position: Sitting, BP Cuff Size: Adult)   Pulse 76   Ht 1.676 m (5' 6\")   Wt 70.3 kg (155 lb)   SpO2 96%   BMI 25.02 kg/m²     Physical Exam   Constitutional: No distress.   HENT:   Patient wearing a mask due to COVID precautions   Eyes: No scleral icterus.   Neck: No JVD present.   Cardiovascular: Normal rate and normal heart sounds. Exam reveals no gallop and no friction rub.   No murmur heard.  Pulmonary/Chest: No respiratory distress. She has no wheezes. She has no rales.   Abdominal: Soft. " Bowel sounds are normal.   Musculoskeletal:         General: No edema.   Neurological: She is alert.   Skin: No rash noted. She is not diaphoretic.   Psychiatric: She has a normal mood and affect.     We reviewed the images of her echocardiogram    We reviewed in person the most recent labs  Recent Results (from the past 4032 hour(s))   EC-ECHOCARDIOGRAM COMPLETE W/O CONT    Collection Time: 20 11:36 AM   Result Value Ref Range    Eject.Frac. MOD BP 63.91     Eject.Frac. MOD 4C 69.19     Eject.Frac. MOD 2C 53.64     Left Ventrical Ejection Fraction 65    CBC WITH DIFFERENTIAL    Collection Time: 20  4:12 AM   Result Value Ref Range    WBC 4.4 3.4 - 10.8 x10E3/uL    RBC 4.17 3.77 - 5.28 x10E6/uL    Hemoglobin 13.9 11.1 - 15.9 g/dL    Hematocrit 42.5 34.0 - 46.6 %     (H) 79 - 97 fL    MCH 33.3 (H) 26.6 - 33.0 pg    MCHC 32.7 31.5 - 35.7 g/dL    RDW 13.6 11.7 - 15.4 %    Platelet Count 359 150 - 450 x10E3/uL    Neutrophils-Polys 68 Not Estab. %    Lymphocytes 25 Not Estab. %    Monocytes 5 Not Estab. %    Eosinophils 2 Not Estab. %    Basophils 0 Not Estab. %    Immature Cells CANCELED     Neutrophils (Absolute) 3.0 1.4 - 7.0 x10E3/uL    Lymphs (Absolute) 1.1 0.7 - 3.1 x10E3/uL    Monos (Absolute) 0.2 0.1 - 0.9 x10E3/uL    Eos (Absolute) 0.1 0.0 - 0.4 x10E3/uL    Baso (Absolute) 0.0 0.0 - 0.2 x10E3/uL    Immature Granulocytes 0 Not Estab. %    Immature Granulocytes (abs) 0.0 0.0 - 0.1 x10E3/uL    Nucleated RBC CANCELED     Comments-Diff CANCELED    VITAMIN B12    Collection Time: 20  4:12 AM   Result Value Ref Range    Vitamin B12 -True Cobalamin 950 232 - 1,245 pg/mL   EKG    Collection Time: 20 10:23 AM   Result Value Ref Range    Report       [facility]    Test Date:  2020  Pt Name:    KIMBERLY WHEATLEY                    Department: [facility]  MRN:        [id number]                      Room:  Gender:     Female                       Technician: ANNA  :        1980                    Requested By:WILLIAM MCDERMOTT  Order #:    [id number]                    Reading MD:    Measurements  Intervals                                Axis  Rate:       73                           P:          65  UT:         156                          QRS:        82  QRSD:       80                           T:          68  QT:         368  QTc:        406    Interpretive Statements  SINUS RHYTHM  No previous ECG available for comparison       EKG from office from December 2 was essentially normal as well  Assessment:     1. Palpitations  EKG       Medical Decision Making:  Today's Assessment / Status / Plan:     It was my pleasure to meet with Ms. Chavez.    I think her tachycardia is just from recovery from surgery could not exclude Covid she has no active symptoms    Certainly she could do an event monitor understand palpitations more but she is comfortable with just monitoring overall her pulse now and would let us know if she needs any further testing    Ms. Chavez does not require regular cardiology follow up, I have advised her to call our office or e-mail using my eReceiptst if needed.    It is my pleasure to participate in the care of Ms. Chavez.  Please do not hesitate to contact me with questions or concerns.    William Mcdermott MD PhD [facility]  Cardiologist [facility]    Please note that this dictation was created using voice recognition software. There may be errors I did not discover before finalizing the note.

## 2020-12-17 NOTE — BH THERAPY
" [facility]  Therapy Progress Note      This evaluation was conducted via Zoom using secure and encrypted videoconferencing technology. The patient was in a private location in the [facility].  The patient's identity was confirmed and verbal consent was obtained for this virtual visit.     Patient Name: Tiarra Chavez  Patient MRN: [id number]  Today's Date: 12/23/2020     Type of session:Individual psychotherapy  Length of session: 45 minutes  Persons in attendance:Patient    Subjective/New Info: Ms. Mayen reports that her mediation is coming up this 1/4 and her  encouraged her to write and opening statement; and \"can't believe it has been 8 months since she has worked; not sure of how to write a statement.     This therapists encourage pt to heed 's advice and that her comments would be a great opening, and brainstorming in session may be some talking points for the opening statement.    She also indicates her close friends Tawny and Lo have been very supportive and invites her and her son over to spend time with them so that they may help care for her during chemotx and recovery time; however, she indicated that she did not want to burden them and only visited them instead.    Patient reports that her Mom will not be able to make it bz of covid and LAX restrictions. She also indicated that she continues to work on being more patient with her Son, and others as she is  Objective/Observations:   Participation: Active verbal participation, Engaged and Open to feedback   Grooming: Casual   Cognition: Fully Oriented   Eye contact: Good   Mood: Depressed and Anxious   Affect: Full range and Congruent with content   Thought process: Logical   Speech: Rate within normal limits   Other:     Diagnoses:   1. Adjustment disorder with mixed anxiety and depressed mood    2. Grief counseling    3. B12 deficiency         Current risk:   SUICIDE: Not applicable   Homicide: Not " applicable   Self-harm: Not applicable   Relapse: Not applicable   Other:    Safety Plan reviewed? Not Indicated   If evidence of imminent risk is present, intervention/plan:     Therapeutic Intervention(s): Cognitive modification, Distress tolerance skills, Interpersonal effectiveness skills, Parenting skills, Positive behavior reinforced, Self-care skills, Stressors assessed, Supportive psychotherapy and Therapeutic storytelling    Treatment Goal(s)/Objective(s) addressed: Sress management, assertive communication and conflict of resolution skills and coping with work related issues. Shifting focus with healthy distractions and focusing on hobbies, activities and self care. Opening statement for medication goals and role playing, follow up.     Progress toward Treatment Goals: Mild improvement    Plan:  - Patient is in agreement with the above plan:  YES    Crystal Breen, M.F.T.  [id number]

## 2020-12-23 ENCOUNTER — TELEMEDICINE (OUTPATIENT)
Dept: BEHAVIORAL HEALTH | Facility: CLINIC | Age: 40
End: 2020-12-23
Payer: COMMERCIAL

## 2020-12-23 DIAGNOSIS — Z71.89 GRIEF COUNSELING: ICD-10-CM

## 2020-12-23 DIAGNOSIS — F43.23 ADJUSTMENT DISORDER WITH MIXED ANXIETY AND DEPRESSED MOOD: ICD-10-CM

## 2020-12-23 DIAGNOSIS — E53.8 B12 DEFICIENCY: ICD-10-CM

## 2020-12-23 PROCEDURE — 90834 PSYTX W PT 45 MINUTES: CPT | Mod: 95,CR | Performed by: MARRIAGE & FAMILY THERAPIST

## 2021-01-11 NOTE — BH THERAPY
[facility]  Therapy Progress Note      This evaluation was conducted via Zoom using secure and encrypted videoconferencing technology. The patient was in a private location in the [facility].  The patient's identity was confirmed and verbal consent was obtained for this virtual visit.     Patient Name: Tiarra Chavez  Patient MRN: [id number]  Today's Date: 1/11/2021     Type of session:Individual psychotherapy  Length of session: 45 minutes  Persons in attendance:Patient    Subjective/New Info: Ms. Mayen reports that they her and her Son had a nice holiday.     Patient was receptive to practicing positive parenting skills, with assertive communications and setting healthy boundaries. Psychoeducation on unconditional love and picking and choosing battles with Son, 8 years old, and learning to let things go. Prioritizing chores and discipline practices; and focusing on her health and prioritizing her energy level.    Negative generational parenting skills and applications on identifying and removing negative/unhealthy dialogue and relational dialogue from own parent to create a healthier dialogue and positive discipline practices with Son.     Follow up on [facility] disability case and mediation; file formal hearing with .      Objective/Observations:   Participation: Active verbal participation, Engaged and Open to feedback   Grooming: Casual   Cognition: Fully Oriented   Eye contact: Good   Mood: Depressed and Anxious   Affect: Full range and Congruent with content   Thought process: Goal-directed   Speech: Rate within normal limits   Other:     Diagnoses:   1. Adjustment disorder with mixed anxiety and depressed mood    2. Grief counseling         Current risk:   SUICIDE: Not applicable   Homicide: Not applicable   Self-harm: Not applicable   Relapse: Not applicable   Other:    Safety Plan reviewed? Not Indicated   If evidence of imminent risk is present, intervention/plan:     Therapeutic  Intervention(s): Cognitive modification, Conflict resolution skills, Distress tolerance skills, Interpersonal effectiveness skills, Maladaptive behavior addressed, Positive behavior reinforced, Problem-solving, Self-care skills, Stressors assessed and Supportive psychotherapy    Treatment Goal(s)/Objective(s) addressed: Sress management, assertive communication and conflict of resolution skills and coping with work related issues. Shifting focus with healthy distractions and focusing on hobbies, activities and self care. Negative generational parenting skills and applications on identifying and removing negative/unhealthy disciplining practices and relational dialogue from own parent to create a healthier dialogue and positive discipline practices with Son.     Progress toward Treatment Goals: Mild improvement    Plan:  - Patient is in agreement with the above plan:  YES    RODRÍGUEZ Roy  1/11/2021

## 2021-01-12 ENCOUNTER — TELEMEDICINE (OUTPATIENT)
Dept: BEHAVIORAL HEALTH | Facility: CLINIC | Age: 41
End: 2021-01-12
Payer: COMMERCIAL

## 2021-01-12 DIAGNOSIS — Z71.89 GRIEF COUNSELING: ICD-10-CM

## 2021-01-12 DIAGNOSIS — F43.23 ADJUSTMENT DISORDER WITH MIXED ANXIETY AND DEPRESSED MOOD: ICD-10-CM

## 2021-01-12 PROCEDURE — 90834 PSYTX W PT 45 MINUTES: CPT | Mod: 95,CR | Performed by: MARRIAGE & FAMILY THERAPIST

## 2021-02-16 ENCOUNTER — HOSPITAL ENCOUNTER (OUTPATIENT)
Dept: RADIOLOGY | Facility: MEDICAL CENTER | Age: 41
End: 2021-02-16
Attending: INTERNAL MEDICINE
Payer: COMMERCIAL

## 2021-02-16 ENCOUNTER — APPOINTMENT (OUTPATIENT)
Dept: SLEEP MEDICINE | Facility: MEDICAL CENTER | Age: 41
End: 2021-02-16
Payer: COMMERCIAL

## 2021-02-16 ENCOUNTER — TELEPHONE (OUTPATIENT)
Dept: PHYSICAL THERAPY | Facility: REHABILITATION | Age: 41
End: 2021-02-16

## 2021-02-16 DIAGNOSIS — C50.911 MALIGNANT NEOPLASM OF RIGHT FEMALE BREAST, UNSPECIFIED ESTROGEN RECEPTOR STATUS, UNSPECIFIED SITE OF BREAST (HCC): ICD-10-CM

## 2021-02-16 PROCEDURE — A9503 TC99M MEDRONATE: HCPCS

## 2021-02-19 ENCOUNTER — HOSPITAL ENCOUNTER (OUTPATIENT)
Dept: RADIOLOGY | Facility: MEDICAL CENTER | Age: 41
End: 2021-02-19
Attending: INTERNAL MEDICINE
Payer: COMMERCIAL

## 2021-02-19 DIAGNOSIS — C50.911 MALIGNANT NEOPLASM OF RIGHT FEMALE BREAST, UNSPECIFIED ESTROGEN RECEPTOR STATUS, UNSPECIFIED SITE OF BREAST (HCC): ICD-10-CM

## 2021-02-19 PROCEDURE — 700117 HCHG RX CONTRAST REV CODE 255: Performed by: INTERNAL MEDICINE

## 2021-02-19 PROCEDURE — 71260 CT THORAX DX C+: CPT

## 2021-02-19 RX ADMIN — IOHEXOL 100 ML: 350 INJECTION, SOLUTION INTRAVENOUS at 15:19

## 2021-02-19 RX ADMIN — IOHEXOL 25 ML: 240 INJECTION, SOLUTION INTRATHECAL; INTRAVASCULAR; INTRAVENOUS; ORAL at 15:19

## 2021-04-27 NOTE — BH THERAPY
[facility]  Therapy Progress Note      This evaluation was conducted via Zoom using secure and encrypted videoconferencing technology. The patient was in a private location in the [facility].    The patient's identity was confirmed and verbal consent was obtained for this virtual visit.     Patient Name: Tiarra Chavez  Patient MRN: [id number]  Today's Date: 4/28/2021     Type of session:Individual psychotherapy  Length of session: 45 minutes  Persons in attendance:Patient    Subjective/New Info: Ms. Mayen reports that she has been cleared by her oncologist to go back to work since January. She also reports that she has been waiting to see this therapist for about a month bz calender was filled; and this therapist verbalized appreciation for her patience and dedication to therapy.    She also indicates insight that her anxiety has been heighten returning to work for 40 hours, and recently took a CPR class and her breathing has been more difficult, and the stresses at work with patients and colleagues contributes to sx of anxiety ad related issues; and became tearful and able to self sooth through the process.     Patient was open to be reminded to practicing self care measures, taking deep breaths and removing herself from stresses at work/and related pressures at work bz of the vaccinations.     Positively coping with [facility] case and related disability issues; with case moving forward for compensation. She also has insight on working in an emotionally toxic environment as her boss recently yelled at her across the room about logging in and out of the computer/work station. Practicing assertive communication and setting healthy boundaries.     Objective/Observations:   Participation: Active verbal participation and Open to feedback   Grooming: Casual   Cognition: Fully Oriented   Eye contact: Good   Mood: Depressed and Anxious   Affect: Full range and Congruent with content   Thought process:  "Logical   Speech: Rate within normal limits   Other:     Diagnoses:   1. Adjustment disorder with mixed anxiety and depressed mood    2. Grief counseling    3. Condition influencing health status    4. Malignant neoplasm of lower-inner quadrant of right breast of female, estrogen receptor positive (HCC)         Current risk:   SUICIDE: Not applicable   Homicide: Not applicable   Self-harm: Not applicable   Relapse: Not applicable   Other:    Safety Plan reviewed? Not Indicated   If evidence of imminent risk is present, intervention/plan:     Therapeutic Intervention(s): Cognitive modification, Develop/modify treatment plan, Distress tolerance skills, Goal-setting, Interpersonal effectiveness skills, Maladaptive behavior addressed, Positive behavior reinforced, Problem-solving, Self-care skills, Stressors assessed, Supportive psychotherapy, Therapeutic metaphor and Therapeutic storytelling    Treatment Goal(s)/Objective(s) addressed: Sress management, assertive communication and conflict of resolution skills; and coping with work related issues. Shifting focus with healthy distractions and focusing on hobbies, activities and self care; when sx of anxiety and depression heightens. Setting limits and removing self from heightened situations.     Progress toward Treatment Goals: Moderate improvement    Plan:  - \"Homework\" recommendation: 8 dimensions of wellness and setting healthy boundaries  - Patient is in agreement with the above plan:  YES    RODRÍGUEZ Roy  4/28/2021                                   "

## 2021-04-28 ENCOUNTER — TELEMEDICINE (OUTPATIENT)
Dept: BEHAVIORAL HEALTH | Facility: CLINIC | Age: 41
End: 2021-04-28
Payer: COMMERCIAL

## 2021-04-28 DIAGNOSIS — Z78.9 CONDITION INFLUENCING HEALTH STATUS: ICD-10-CM

## 2021-04-28 DIAGNOSIS — C50.311 MALIGNANT NEOPLASM OF LOWER-INNER QUADRANT OF RIGHT BREAST OF FEMALE, ESTROGEN RECEPTOR POSITIVE (HCC): ICD-10-CM

## 2021-04-28 DIAGNOSIS — F43.23 ADJUSTMENT DISORDER WITH MIXED ANXIETY AND DEPRESSED MOOD: ICD-10-CM

## 2021-04-28 DIAGNOSIS — Z17.0 MALIGNANT NEOPLASM OF LOWER-INNER QUADRANT OF RIGHT BREAST OF FEMALE, ESTROGEN RECEPTOR POSITIVE (HCC): ICD-10-CM

## 2021-04-28 DIAGNOSIS — Z71.89 GRIEF COUNSELING: ICD-10-CM

## 2021-04-28 PROCEDURE — 90834 PSYTX W PT 45 MINUTES: CPT | Mod: 95,CR | Performed by: MARRIAGE & FAMILY THERAPIST

## 2021-05-12 ENCOUNTER — TELEPHONE (OUTPATIENT)
Dept: PHYSICAL THERAPY | Facility: MEDICAL CENTER | Age: 41
End: 2021-05-12

## 2021-05-12 NOTE — OP THERAPY DISCHARGE SUMMARY
Outpatient Physical Therapy  DISCHARGE SUMMARY NOTE      [facility]  [address]  [place] [address]  Phone:  [phone]  Fax:  [phone]    Date of Visit: 05/12/2021    Patient: Tiarra Chavez  YOB: 1980  MRN: [id number]     Referring Provider: No referring provider defined for this encounter.   Referring Diagnosis No admission diagnoses are documented for this encounter.         Functional Assessment Used        Your patient is being discharged from Physical Therapy with the following comments:   · Patient has failed to schedule or reschedule follow-up visits   · Pt has not been seen >30 days    Comments:  Pt last seen on 11/3/20. Please see note on 11/3/20 for details    Limitations Remaining:  Please see note on 11/3/20 for details    Recommendations:  Pt has not been seen >30 days. Per policy, pt will need to be seen by PCP or acquire another referral prior to initiating skilled physical therapy. Pt is being discharged at this time.    Teddy Peck, PT    Date: 5/12/2021

## 2021-05-20 NOTE — PROGRESS NOTES
"[facility]   Therapy Progress Note      This visit was conducted via Zoom using secure and encrypted videoconferencing technology.  The patient was in a private location in the [facility].  The patient's identity was confirmed and verbal consent was obtained for this virtual visit.      Name: Tiarra Chavez  MRN: [id number]  : 1980  Age: 41 y.o.  Date of assessment: 2021  PCP: Beatris Pathak M.D.  Persons in attendance: Patient  Total session time: 45 minutes    Objective Observations:   Participation:Active verbal participation and Open to feedback   Grooming:Casual   Cognition:Fully Oriented   Eye Contact:Good   Mood:Depressed and Anxious   Affect:Full range and Congruent with content   Thought Process:Goal-directed   Speech:Rate within normal limits    Current Risk:   Suicide: NA   Homicide: NA   Self-Harm: NA   Relapse: NA   Safety Plan Reviewed: not applicable    Topics addressed in psychotherapy include:     Ms. Mayen reports is having more pain lately and has insight that it is post operative from mastectomy. Also, taking medications that has been helping with her sleep patterns. She was open to sharing a sleep pattern and makes efforts to listen to mindfulness alexx and lavender scent and related sleep patterns.     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    Processing work related stress and positive parenting with her exspouse. Mindful of work life balance and following through with practicing the 8 dimensions of wellness. She also has goals to have visit more often for support for her healing process and support for her 9 year old son.    Patient also reports that she has been attending a on-line program \"Plan [employer] forum\" for a life enrichment for self and child. Also, exercising weekly that lifts her mood and improve her health.    She also " reports having thoughts of SI but no plans to do so; reports low risk; and feels that it may be the result of the estrogen blocker that contribute; and was recommended to take antidepressants by her pcp. PHQ-9 at 13 for moderate depression.       Care Plan Updated: Yes    Does patient express agreement with the above plan? Yes     Diagnosis:  1. Adjustment disorder with mixed anxiety and depressed mood    2. Condition influencing health status    3. Grief counseling    4. MICHELLE (generalized anxiety disorder)        Referral appointment(s) scheduled? Yes       DORIE Roy.

## 2021-05-25 ENCOUNTER — TELEMEDICINE (OUTPATIENT)
Dept: BEHAVIORAL HEALTH | Facility: CLINIC | Age: 41
End: 2021-05-25
Payer: COMMERCIAL

## 2021-05-25 DIAGNOSIS — Z78.9 CONDITION INFLUENCING HEALTH STATUS: ICD-10-CM

## 2021-05-25 DIAGNOSIS — F43.23 ADJUSTMENT DISORDER WITH MIXED ANXIETY AND DEPRESSED MOOD: ICD-10-CM

## 2021-05-25 DIAGNOSIS — Z71.89 GRIEF COUNSELING: ICD-10-CM

## 2021-05-25 DIAGNOSIS — F41.1 GAD (GENERALIZED ANXIETY DISORDER): ICD-10-CM

## 2021-05-25 PROCEDURE — 90834 PSYTX W PT 45 MINUTES: CPT | Mod: 95 | Performed by: MARRIAGE & FAMILY THERAPIST

## 2021-05-25 ASSESSMENT — PATIENT HEALTH QUESTIONNAIRE - PHQ9
5. POOR APPETITE OR OVEREATING: 2 - MORE THAN HALF THE DAYS
CLINICAL INTERPRETATION OF PHQ2 SCORE: 3
SUM OF ALL RESPONSES TO PHQ QUESTIONS 1-9: 14

## 2021-06-23 ENCOUNTER — APPOINTMENT (OUTPATIENT)
Dept: BEHAVIORAL HEALTH | Facility: CLINIC | Age: 41
End: 2021-06-23
Payer: COMMERCIAL

## 2021-07-01 NOTE — PROGRESS NOTES
[facility]   Therapy Progress Note      This visit was conducted via Zoom using secure and encrypted videoconferencing technology.  The patient was in a private location in the [facility].  The patient's identity was confirmed and verbal consent was obtained for this virtual visit.      Name: Tiarra Chavez  MRN: [id number]  : 1980  Age: 41 y.o.  Date of assessment: 2021  PCP: Beatirs Pathak M.D.  Persons in attendance: Patient  Total session time: 45 minutes    Objective Observations:   Participation:Active verbal participation and Open to feedback              Grooming:Casual              Cognition:Fully Oriented              Eye Contact:Good              Mood:Depressed and Anxious              Affect:Full range and Congruent with content              Thought Process:Goal-directed              Speech:Rate within normal limits    Current Risk:   Suicide: NA   Homicide: NA   Self-Harm: NA   Relapse: NA   Safety Plan Reviewed: not applicable    Topics addressed in psychotherapy include: ***    Care Plan Updated: Yes    Does patient express agreement with the above plan? Yes     Diagnosis:  1. Adjustment disorder with mixed anxiety and depressed mood    2. MICHELLE (generalized anxiety disorder)    3. Grief counseling        Referral appointment(s) scheduled? Yes       RODRÍGUEZ Roy

## 2021-07-07 ENCOUNTER — TELEMEDICINE (OUTPATIENT)
Dept: BEHAVIORAL HEALTH | Facility: CLINIC | Age: 41
End: 2021-07-07
Payer: COMMERCIAL

## 2021-07-07 DIAGNOSIS — F41.1 GAD (GENERALIZED ANXIETY DISORDER): ICD-10-CM

## 2021-07-07 DIAGNOSIS — Z71.89 GRIEF COUNSELING: ICD-10-CM

## 2021-07-07 DIAGNOSIS — F43.23 ADJUSTMENT DISORDER WITH MIXED ANXIETY AND DEPRESSED MOOD: ICD-10-CM

## 2021-07-08 NOTE — PROGRESS NOTES
"[facility]   Therapy Progress Note      This visit was conducted via Zoom using secure and encrypted videoconferencing technology.  The patient was in a private location in the [facility].  The patient's identity was confirmed and verbal consent was obtained for this virtual visit.      Name: Tiarra Chavez  MRN: [id number]  : 1980  Age: 41 y.o.  Date of assessment: 2021  PCP: Beatris Pathak M.D.  Persons in attendance: Patient  Total session time: 45 minutes    Objective Observations:  Participation:Active verbal participation and Open to  feedback              Grooming:Casual              Cognition:Fully Oriented              Eye Contact:Good              Mood:Depressed and Anxious              Affect:Full range and Congruent with content              Thought Process:Goal-directed              Speech:Rate within normal limits    Current Risk:   Suicide: NA   Homicide: NA   Self-Harm: NA   Relapse: NA   Safety Plan Reviewed: not applicable    Topics addressed in psychotherapy include:    Ms. Mayen reports that she had a \"belly ache\" for the past week, and touch base with her doctor's office and will being see him next week.     She employment equal opportunity (eeo) investigator from the [facility] finished the report; and she will need to take the next step and have a  review the case resulting needing to higher another ; and is willing to pay the retainer to hopefully finish and win the case.     Patient also continues to work both at [employer] and the [facility] and feeling overwhelm; and being mindful as much as possible to rest when possible and self car.     Follow up on VA case and pros and cons of working at the company and health conditions; work life balance goals.    Care Plan Updated: Yes    Does patient express agreement with the above plan? Yes     Diagnosis:  1. Adjustment disorder with mixed anxiety and depressed mood    2. MICHELLE (generalized anxiety disorder)    3. B12 " deficiency    4. Grief counseling        Referral appointment(s) scheduled? Yes       DORIE Roy.

## 2021-07-13 ENCOUNTER — TELEMEDICINE (OUTPATIENT)
Dept: BEHAVIORAL HEALTH | Facility: CLINIC | Age: 41
End: 2021-07-13
Payer: COMMERCIAL

## 2021-07-13 DIAGNOSIS — F43.23 ADJUSTMENT DISORDER WITH MIXED ANXIETY AND DEPRESSED MOOD: ICD-10-CM

## 2021-07-13 DIAGNOSIS — E53.8 B12 DEFICIENCY: ICD-10-CM

## 2021-07-13 DIAGNOSIS — Z71.89 GRIEF COUNSELING: ICD-10-CM

## 2021-07-13 DIAGNOSIS — F41.1 GAD (GENERALIZED ANXIETY DISORDER): ICD-10-CM

## 2021-07-13 PROCEDURE — 90834 PSYTX W PT 45 MINUTES: CPT | Mod: 95 | Performed by: MARRIAGE & FAMILY THERAPIST

## 2021-08-17 NOTE — PROGRESS NOTES
[facility]   Therapy Progress Note      This visit was conducted via Zoom using secure and encrypted videoconferencing technology.  The patient was in a private location in the state of [place].  The patient's identity was confirmed and verbal consent was obtained for this virtual visit.      Name: Tiarra Chavez  MRN: [id number]  : 1980  Age: 41 y.o.  Date of assessment: 2021  PCP: Beatris Pathak M.D.  Persons in attendance: Patient  Total session time: 45 minutes    Objective Observations:  Participation:Active verbal participation and Open to feedback              Grooming:Casual              Cognition:Fully Oriented              Eye Contact:Good              Mood:Depressed and Anxious              Affect:Full range and Congruent with content              Thought Process:Goal-directed              Speech:Rate within normal limits    Current Risk:  Suicide: NA              Homicide: NA              Self-Harm: NA              Relapse: NA              Safety Plan Reviewed: not applicable     Topics addressed in psychotherapy include:    Ms. Mayen reports that she has been feeling sick and went home early from work on Monday, and on Tuesday came back to work; and occupational health requested her to get covid testing; and then was ask to work remotely. Then, return and informed by another nurse at the [facility] to return to work. 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    Self care goals, relaxation meditations and practicing assertive communication and setting healthy boundaries. Goals to seek accommodations to work remotely, as she is currently in remission, recently been through chemotx.     For her case mediation in January was run by the [facility]; now option through mediation through the EEO (equal employment opportunity) per her . She was encourage to ask for mediation, and possibly settle if  "needed.    Goals for this case is overall for management to treat fairly, and reasonable accommodations in May 2020 for remote work as her result of cxr treatments in 1/2020 and related chemotx and requested for FMLA in March 2020. Additionally VA's policy was unclear and appear not to follow through or inconsistent at times. For example, evaluation process was chaned currently without her signature; with The last evaulation in 10/2019 and reported she did not sign it, but it was signed.    Narrative tx:   VA in 2016 pushed for telework and that she signed it;   however, when she was dx with Cxr in 2020 she requested for reasonable accomodation for telework (\"bring computerhome/lock the door and work from home,\" repeatedly at it has not bee honored. She was open to write a time line of this journey that she has been on with work and health related challenges.      Care Plan Updated: Yes    Does patient express agreement with the above plan? Yes     Diagnosis:  1. Adjustment disorder with mixed anxiety and depressed mood    2. MICHELLE (generalized anxiety disorder)        Referral appointment(s) scheduled? Yes       DORIE Roy.    "

## 2021-08-19 ENCOUNTER — TELEMEDICINE (OUTPATIENT)
Dept: BEHAVIORAL HEALTH | Facility: CLINIC | Age: 41
End: 2021-08-19
Payer: COMMERCIAL

## 2021-08-19 DIAGNOSIS — F43.23 ADJUSTMENT DISORDER WITH MIXED ANXIETY AND DEPRESSED MOOD: ICD-10-CM

## 2021-08-19 DIAGNOSIS — F41.1 GAD (GENERALIZED ANXIETY DISORDER): ICD-10-CM

## 2021-08-19 PROCEDURE — 90834 PSYTX W PT 45 MINUTES: CPT | Mod: 95 | Performed by: MARRIAGE & FAMILY THERAPIST

## 2021-09-30 NOTE — PROGRESS NOTES
[facility]   Therapy Progress Note      This visit was conducted via Zoom using secure and encrypted videoconferencing technology.  The patient was in a private location in the [facility].  The patient's identity was confirmed and verbal consent was obtained for this virtual visit.      Name: Tiarra Chavez  MRN: [id number]  : 1980  Age: 41 y.o.  Date of assessment: 10/04/2021  PCP: Beatris Pathak M.D.  Persons in attendance: Patient  Total session time: 45 minutes    Objective Observations:  Participation:Active verbal participation and Open to  feedback              Grooming:Casual              Cognition:Fully Oriented              Eye Contact:Good              Mood:Depressed and Anxious              Affect:Full range and Congruent with content              Thought Process:Goal-directed              Speech:Rate within normal limits    Current Risk:  Suicide: NA   Homicide: NA   Self-Harm: NA   Relapse: NA   Safety Plan Reviewed: not applicable    Topics addressed in psychotherapy include:     Ms. Mayen reports that she is seeing a new oncologist who is more brilliant and kind; and identify changes in her hormones with Cxr treatment medications.    She also was able to have insight on emotional triggers, is that her case is close to settlement; and being mindful of taking deep breaths and better self care.     Patient also was open to processing thoughts and feelings of DABDA and grief related issues with her health conditions and settlement issues coming to closure.    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 Future dreams and goals to be awarded by the President for her services.       Care Plan Updated: Yes    Does patient express agreement with the above plan? Yes     Diagnosis:  1.  Adjustment disorder with mixed anxiety and depressed mood    2. MICHELLE (generalized anxiety disorder)        Referral appointment(s) scheduled? Yes       DORIE Roy.

## 2021-10-04 ENCOUNTER — TELEMEDICINE (OUTPATIENT)
Dept: BEHAVIORAL HEALTH | Facility: CLINIC | Age: 41
End: 2021-10-04
Payer: COMMERCIAL

## 2021-10-04 DIAGNOSIS — F43.23 ADJUSTMENT DISORDER WITH MIXED ANXIETY AND DEPRESSED MOOD: ICD-10-CM

## 2021-10-04 DIAGNOSIS — F41.1 GAD (GENERALIZED ANXIETY DISORDER): ICD-10-CM

## 2021-10-04 PROCEDURE — 90834 PSYTX W PT 45 MINUTES: CPT | Mod: 95 | Performed by: MARRIAGE & FAMILY THERAPIST

## 2021-11-01 NOTE — PROGRESS NOTES
"[facility]   Therapy Progress Note      This visit was conducted via Zoom using secure and encrypted videoconferencing technology.  The patient was in a [facility] [place].  The patient's identity was confirmed and verbal consent was obtained for this virtual visit.      Name: Tiarra Chavez  MRN: [id number]  : 1980  Age: 41 y.o.  Date of assessment: 2021  PCP: Beatris Pathak M.D.  Persons in attendance: Patient  Total session time: 45 minutes    Objective Observations:    Participation:Active verbal participation and  Open to feedback              Grooming:Casual              Cognition:Fully Oriented              Eye Contact:Good              Mood:Depressed and Anxious              Affect:Full range and Congruent with content              Thought Process:Goal-directed              Speech:Rate within normal limits        Current Risk:  Suicide: NA   Homicide: NA   Self-Harm: NA   Relapse: NA   Safety Plan Reviewed: not applicable     Topics addressed in psychotherapy include:     Ms. Mayen reports that her mediation was held on zoom by  students from [facility] and some concerns of how successful they would be at conveying her case to the  's [facility].  Her  also is arguing for \"pain and suffering and needing telehealth work and related work  accommodations.\"     Patient reports, \"my boss does not care about me, the whole organization seems to not care, just need them to be fair.\" She also indicates feeling hopeless; and not suicidal as she has a Son and would not do this to him.     She request to work from home with telework phone conference and reasonable accomodation as she has a hx of cancer treatments and related medical conditions.    PH-Q: 20 \"Severe depression\"    Care Plan Updated: Yes    Does patient express agreement with the above plan? Yes     Diagnosis:  1. Adjustment disorder with mixed anxiety and depressed mood    2. MICHELLE (generalized " anxiety disorder)    3. Grief counseling    4. Health care maintenance        Referral appointment(s) scheduled? Yes       DORIE Roy.

## 2021-11-02 ENCOUNTER — TELEMEDICINE (OUTPATIENT)
Dept: BEHAVIORAL HEALTH | Facility: CLINIC | Age: 41
End: 2021-11-02
Payer: COMMERCIAL

## 2021-11-02 DIAGNOSIS — F32.2 SEVERE DEPRESSION (HCC): ICD-10-CM

## 2021-11-02 DIAGNOSIS — D51.0 PERNICIOUS ANEMIA: ICD-10-CM

## 2021-11-02 DIAGNOSIS — D75.89 MACROCYTOSIS: ICD-10-CM

## 2021-11-02 DIAGNOSIS — Z00.00 HEALTH CARE MAINTENANCE: ICD-10-CM

## 2021-11-02 DIAGNOSIS — F41.1 GAD (GENERALIZED ANXIETY DISORDER): ICD-10-CM

## 2021-11-02 DIAGNOSIS — F43.23 ADJUSTMENT DISORDER WITH MIXED ANXIETY AND DEPRESSED MOOD: ICD-10-CM

## 2021-11-02 DIAGNOSIS — Z71.89 GRIEF COUNSELING: ICD-10-CM

## 2021-11-02 PROCEDURE — 90834 PSYTX W PT 45 MINUTES: CPT | Mod: 95 | Performed by: MARRIAGE & FAMILY THERAPIST

## 2021-11-02 RX ORDER — CYANOCOBALAMIN 1000 UG/ML
INJECTION, SOLUTION INTRAMUSCULAR; SUBCUTANEOUS
Qty: 12 ML | Refills: 0 | Status: SHIPPED | OUTPATIENT
Start: 2021-11-02 | End: 2022-01-03

## 2021-11-02 ASSESSMENT — PATIENT HEALTH QUESTIONNAIRE - PHQ9
5. POOR APPETITE OR OVEREATING: 3 - NEARLY EVERY DAY
CLINICAL INTERPRETATION OF PHQ2 SCORE: 5
SUM OF ALL RESPONSES TO PHQ QUESTIONS 1-9: 20

## 2021-11-10 ENCOUNTER — APPOINTMENT (OUTPATIENT)
Dept: SLEEP MEDICINE | Facility: MEDICAL CENTER | Age: 41
End: 2021-11-10
Payer: COMMERCIAL

## 2021-11-29 NOTE — PROGRESS NOTES
"Telemedicine Visit: New Patient     This encounter was conducted via Zoom using secure and encrypted video conferencing technology.  The patient was in a private location in the state of (State:[address]). The patient's identity was confirmed and verbal consent was obtained for this virtual visit.    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 Juan Lopez MD for evaluation of possible CASSIE.  He has never previously seen a sleep provider. 2020 breast cancer dx. Had anemia then. Found that sleep was non-restorative. Never enough \"juice.\" Also works part-time at [employer]. Volunteers for the [employer] and is otherwise very busy. Out of work for 11 months 2/2 breast cancer Rx.  Goes to bed at 9 and gets up at 6. Has noc awakenings up to 35 times according to [employer]. Had 4 rounds of chemo. Feels   Tamoxifen is worse than the chemo but going off felt no ifferent.. Has a 9 year old. No regular bed partner.    Symptom Summary:  Snoring: unknown  Very loud snoring: unnknown  Witnessed apneas: unknown  Resuscitative snorts: infrequently  Nocturnal shortness of breath: + and also duringthe day  Non-restorative sleep: +  Insomnia: + sleep maintenance  Nocturnal awakenings: +  Nocturia: + rarely   EDS: rarely  Fatigue: + and nausea  Tiredness: +  Falls asleep accidentally: +   Napping or returning to bed after arising: frequently  Restless legs: -  Limb movements during sleep: +  Nocturnal headaches: -      Significant comorbidities modifying factors include palpitations, mastectomy for breast cancer status post chemo, never smoker, macrocytosis,      ROS  See HPI  Constitutional: Negative for fever, chills and malaise/fatigue.   HENT: Negative for congestion.    Eyes: Negative for pain.   Respiratory: Negative for cough and shortness of breath.    Cardiovascular: Negative for leg swelling.   Gastrointestinal: Negative for nausea, vomiting, " abdominal pain and diarrhea.   Genitourinary: Negative for dysuria and hematuria.   Skin: Negative for rash.   Neurological: Negative for dizziness, focal weakness and headaches.   Endo/Heme/Allergies: Does not bruise/bleed easily.   Psychiatric/Behavioral: Negative for depression.  The patient is not nervous/anxious.      Allergies   Allergen Reactions   • Shellfish Allergy Hives   • Nickel Itching     rash       Current medicines (including changes today)  Current Outpatient Medications   Medication Sig Dispense Refill   • cyanocobalamin (VITAMIN B-12) 1000 MCG/ML Solution INJECT 1 ML INTRAMUSCULARLY  ONCE A WEEK 12 mL 0   • tamoxifen (NOLVADEX) 20 MG tablet Take 20 mg by mouth.     • Multiple Vitamin (MULTI-VITAMIN DAILY PO) Take  by mouth every day.     • Cyanocobalamin (VITAMIN B 12 PO) Take 1 Tab by mouth every day.     • folic acid (FOLVITE) 1 MG TABS Take 1 mg by mouth every day.       No current facility-administered medications for this visit.       She  has a past medical history of Allergic rhinitis (2011), Cancer ([facility]) (), Headache(784.0), Pernicious anemia, Snoring, and Urinary incontinence.  She  has a past surgical history that includes lumpectomy (); vaginal perineal exam repair (2012); other (Left, ); pr mastectomy, simple, complete (Bilateral, 3/26/2020); node biopsy sentinel (Right, 3/26/2020); and breast biopsy (Right, 2020).      Family History   Problem Relation Age of Onset   • Hypertension Mother    • Thyroid Mother         Hypothyroid   • Hyperlipidemia Father    • Cancer Father         T cell lymphoma   • Heart Disease Maternal Grandfather         MI 38 yo, unknown RF.   • Cancer Paternal Uncle         lymphoma     Family Status   Relation Name Status   • Mo Hyperthyroidism Alive        59yo   • Fa     • MGFa  (Not Specified)   • PUn  (Not Specified)       Patient Active Problem List    Diagnosis Date Noted   • CASSIE (obstructive sleep apnea) 2021    • Condition influencing health status 04/28/2021   • B12 deficiency 11/30/2020   • Adjustment disorder with mixed anxiety and depressed mood 10/05/2020   • Grief counseling 10/05/2020   • Malignant neoplasm of lower-inner quadrant of right female breast (HCC) 03/27/2020   • Hypovitaminosis D 10/30/2019   • Macrocytosis 04/26/2019   • Macromastia 04/26/2019   • Pernicious anemia 12/20/2017   • MICHELLE (generalized anxiety disorder) 10/25/2017   • Health care maintenance 10/25/2017          Objective:   Weight 68 kg  BMI 24.21 kg/m²  Respiratory rate 16    Physical Exam:  Constitutional: Alert, no distress, well-groomed.  Skin: No rashes in visible areas.  Eye: Round. Conjunctiva clear, lids normal. No icterus.   ENMT: Lips pink without lesions, good dentition, moist mucous membranes. Phonation normal.  Neck: No masses, no thyromegaly. Moves freely without pain.  CV: Pulse as reported by patient  Respiratory: Unlabored respiratory effort, no cough or audible wheeze  Psych: Alert and oriented x3, normal affect and mood.       Medical Decision Making           The medical record was reviewed in its entirety including the referral notes, records from primary care, consultants notes, hospital records, labs, imaging, microbiology, immunology, and immunizations.  Care gaps identified and reviewed with the patient.    Diagnostic and titration nocturnal polysomnogram's, home sleep apnea tests, continuous nocturnal oximetry results, multiple sleep latency tests, and compliance reports reviewed.        The patient has signs and symptoms consistent with obstructive sleep apnea hypopnea syndrome. Will schedule to have a nocturnal polysomnogram using zolpidem to assist with sleep onset and maintenance should the need arise. Will return after the results are available to determine further diagnostic needs and/or treatment options.    The risks of untreated sleep apnea were discussed with the patient at length. Patients with CASSIE are  at increased risk of cardiovascular disease including coronary artery disease, systemic arterial hypertension, pulmonary arterial hypertension, cardiac arrythmias, and stroke. CASSIE patients have an increased risk of motor vehicle accidents, type 2 diabetes, chronic kidney disease, and non-alcoholic liver disease. The patient was advised to avoid driving a motor vehicle when drowsy.    Positive airway pressure, such as CPAP, is considered first-line and preferred therapy for sleep apnea and may reverse both symptoms and risks.    Have advised the patient to follow up with the appropriate healthcare practitioners for all other medical problems and issues.      The following treatment plan was discussed:     1. CASSIE (obstructive sleep apnea)      Time spent 45 minutes. More than 1/2 the time spent coordinating care, counseling the patient, and reviewing the pathophysiology and treatment options for sleep apnea and the patient specific modifying factors and significant co-morbidities.        Follow-up: RTC after Sleep Study

## 2021-12-02 ENCOUNTER — TELEMEDICINE (OUTPATIENT)
Dept: SLEEP MEDICINE | Facility: MEDICAL CENTER | Age: 41
End: 2021-12-02
Payer: COMMERCIAL

## 2021-12-02 VITALS — HEIGHT: 66 IN | BODY MASS INDEX: 24.11 KG/M2 | OXYGEN SATURATION: 95 % | WEIGHT: 150 LBS

## 2021-12-02 DIAGNOSIS — G47.33 OSA (OBSTRUCTIVE SLEEP APNEA): ICD-10-CM

## 2021-12-02 PROCEDURE — 99204 OFFICE O/P NEW MOD 45 MIN: CPT | Mod: 95 | Performed by: INTERNAL MEDICINE

## 2021-12-02 RX ORDER — ZOLPIDEM TARTRATE 5 MG/1
5 TABLET ORAL NIGHTLY PRN
Qty: 2 TABLET | Refills: 0 | Status: SHIPPED | OUTPATIENT
Start: 2021-12-02 | End: 2021-12-03

## 2021-12-21 ENCOUNTER — TELEPHONE (OUTPATIENT)
Dept: SLEEP MEDICINE | Facility: MEDICAL CENTER | Age: 41
End: 2021-12-21
Payer: COMMERCIAL

## 2022-01-02 DIAGNOSIS — D51.0 PERNICIOUS ANEMIA: ICD-10-CM

## 2022-01-02 DIAGNOSIS — D75.89 MACROCYTOSIS: ICD-10-CM

## 2022-01-03 RX ORDER — CYANOCOBALAMIN 1000 UG/ML
INJECTION, SOLUTION INTRAMUSCULAR; SUBCUTANEOUS
Qty: 12 ML | Refills: 0 | Status: SHIPPED | OUTPATIENT
Start: 2022-01-03 | End: 2023-05-12

## 2022-01-06 ENCOUNTER — HOME STUDY (OUTPATIENT)
Dept: SLEEP MEDICINE | Facility: MEDICAL CENTER | Age: 42
End: 2022-01-06
Attending: INTERNAL MEDICINE
Payer: COMMERCIAL

## 2022-01-06 DIAGNOSIS — G47.33 OSA (OBSTRUCTIVE SLEEP APNEA): ICD-10-CM

## 2022-01-06 PROCEDURE — 95806 SLEEP STUDY UNATT&RESP EFFT: CPT | Performed by: INTERNAL MEDICINE

## 2022-01-09 NOTE — PROCEDURES
Interpretation:    This home sleep study was performed on 1/6/2022 using a quitchen ApneaLink Air type III device.  The total recording time duration was 8 hours 45 minutes, the monitoring time (flow) duration was 8 hours 34 minutes, and the oxygen saturation evaluation duration was 8 hours 32 minutes.  The overall GINA (respiratory event index) was 13.2, the supine GINA was 13.2, the nonsupine GINA was 13.7, and the upright GINA was 0.  The total number of apneas and hypopneas was 113.  Most of the events (106) were hypopneas.  The Cheyne- Champagne respiration time was 0 minutes and the percentage was 0%.  The oxygen desaturation index was 13.2 and the patient experienced 113 total desaturations.    The baseline oxygen saturation was 96%, the average oxygen saturation was 93%, and the lowest oxygen saturation was 82%.  The saturations were less than or equal to 88% for 2% or for 8 minutes.    The patient experienced 8458 breaths or 16.5 breaths/min.  438 snores were recorded.  Her minimum heart rate was 54, her average 74 and the maximum 101 bpm.    Assessment:    Mild to moderate sleep apnea - GINA 13.2; a home sleep apnea test may underestimate the severity of sleep disordered breathing because total sleep time is unknown and monitoring time is used as it surrogate  Minimal nocturnal desaturation - corey saturation 82% - less than or equal to 88% for 2% of the TIB        Recommendation:    If significant signs, symptoms, and or comorbidities warrant, recommend a positive airway pressure titration. Alternative treatments for OSAH include behavioral modification, use of a dental appliance, and nasopharyngeal reconstructive surgery. Behavior modification includes weight loss, eliminating alcohol and sedatives, and avoiding the supine position. If alternative treatments are used, a follow-up diagnostic study is warranted to ensure efficacy.    Clinical correlation is needed.  An empiric trial of auto titrating CPAP may be an  acceptable option.        Dr. Douglas Phelps M.D.

## 2022-01-10 ENCOUNTER — ANESTHESIA (OUTPATIENT)
Dept: SURGERY | Facility: MEDICAL CENTER | Age: 42
End: 2022-01-10
Payer: COMMERCIAL

## 2022-01-10 ENCOUNTER — HOSPITAL ENCOUNTER (OUTPATIENT)
Facility: MEDICAL CENTER | Age: 42
End: 2022-01-10
Attending: SURGERY | Admitting: SURGERY
Payer: COMMERCIAL

## 2022-01-10 ENCOUNTER — ANESTHESIA EVENT (OUTPATIENT)
Dept: SURGERY | Facility: MEDICAL CENTER | Age: 42
End: 2022-01-10
Payer: COMMERCIAL

## 2022-01-10 VITALS
SYSTOLIC BLOOD PRESSURE: 119 MMHG | HEART RATE: 56 BPM | WEIGHT: 155.87 LBS | BODY MASS INDEX: 25.05 KG/M2 | TEMPERATURE: 96.6 F | HEIGHT: 66 IN | OXYGEN SATURATION: 98 % | RESPIRATION RATE: 20 BRPM | DIASTOLIC BLOOD PRESSURE: 80 MMHG

## 2022-01-10 LAB
EXTERNAL QUALITY CONTROL: NORMAL
HCG UR QL: NEGATIVE
PATHOLOGY CONSULT NOTE: NORMAL
SARS-COV+SARS-COV-2 AG RESP QL IA.RAPID: NEGATIVE

## 2022-01-10 PROCEDURE — 87426 SARSCOV CORONAVIRUS AG IA: CPT | Performed by: SURGERY

## 2022-01-10 PROCEDURE — A9270 NON-COVERED ITEM OR SERVICE: HCPCS | Performed by: ANESTHESIOLOGY

## 2022-01-10 PROCEDURE — 700105 HCHG RX REV CODE 258: Performed by: ANESTHESIOLOGY

## 2022-01-10 PROCEDURE — 160009 HCHG ANES TIME/MIN: Performed by: SURGERY

## 2022-01-10 PROCEDURE — 88304 TISSUE EXAM BY PATHOLOGIST: CPT

## 2022-01-10 PROCEDURE — 700105 HCHG RX REV CODE 258: Performed by: SURGERY

## 2022-01-10 PROCEDURE — 700111 HCHG RX REV CODE 636 W/ 250 OVERRIDE (IP): Performed by: SURGERY

## 2022-01-10 PROCEDURE — 160028 HCHG SURGERY MINUTES - 1ST 30 MINS LEVEL 3: Performed by: SURGERY

## 2022-01-10 PROCEDURE — 700101 HCHG RX REV CODE 250: Performed by: ANESTHESIOLOGY

## 2022-01-10 PROCEDURE — 501838 HCHG SUTURE GENERAL: Performed by: SURGERY

## 2022-01-10 PROCEDURE — 160036 HCHG PACU - EA ADDL 30 MINS PHASE I: Performed by: SURGERY

## 2022-01-10 PROCEDURE — 700111 HCHG RX REV CODE 636 W/ 250 OVERRIDE (IP): Performed by: ANESTHESIOLOGY

## 2022-01-10 PROCEDURE — 700102 HCHG RX REV CODE 250 W/ 637 OVERRIDE(OP): Performed by: ANESTHESIOLOGY

## 2022-01-10 PROCEDURE — 160048 HCHG OR STATISTICAL LEVEL 1-5: Performed by: SURGERY

## 2022-01-10 PROCEDURE — 160002 HCHG RECOVERY MINUTES (STAT): Performed by: SURGERY

## 2022-01-10 PROCEDURE — 81025 URINE PREGNANCY TEST: CPT

## 2022-01-10 PROCEDURE — 160046 HCHG PACU - 1ST 60 MINS PHASE II: Performed by: SURGERY

## 2022-01-10 PROCEDURE — 160035 HCHG PACU - 1ST 60 MINS PHASE I: Performed by: SURGERY

## 2022-01-10 PROCEDURE — 160025 RECOVERY II MINUTES (STATS): Performed by: SURGERY

## 2022-01-10 RX ORDER — ONDANSETRON 2 MG/ML
4 INJECTION INTRAMUSCULAR; INTRAVENOUS
Status: DISCONTINUED | OUTPATIENT
Start: 2022-01-10 | End: 2022-01-10 | Stop reason: HOSPADM

## 2022-01-10 RX ORDER — LABETALOL HYDROCHLORIDE 5 MG/ML
5 INJECTION, SOLUTION INTRAVENOUS
Status: DISCONTINUED | OUTPATIENT
Start: 2022-01-10 | End: 2022-01-10 | Stop reason: HOSPADM

## 2022-01-10 RX ORDER — METOPROLOL TARTRATE 1 MG/ML
1 INJECTION, SOLUTION INTRAVENOUS
Status: DISCONTINUED | OUTPATIENT
Start: 2022-01-10 | End: 2022-01-10 | Stop reason: HOSPADM

## 2022-01-10 RX ORDER — SODIUM CHLORIDE 9 MG/ML
500 INJECTION, SOLUTION INTRAVENOUS
Status: DISCONTINUED | OUTPATIENT
Start: 2022-01-10 | End: 2022-01-10 | Stop reason: HOSPADM

## 2022-01-10 RX ORDER — SODIUM CHLORIDE, SODIUM LACTATE, POTASSIUM CHLORIDE, CALCIUM CHLORIDE 600; 310; 30; 20 MG/100ML; MG/100ML; MG/100ML; MG/100ML
INJECTION, SOLUTION INTRAVENOUS CONTINUOUS
Status: DISCONTINUED | OUTPATIENT
Start: 2022-01-10 | End: 2022-01-10 | Stop reason: HOSPADM

## 2022-01-10 RX ORDER — NAPROXEN SODIUM 220 MG
220 TABLET ORAL 2 TIMES DAILY PRN
COMMUNITY
End: 2023-05-12

## 2022-01-10 RX ORDER — SODIUM FLUORIDE1.1%, POTASSIUM NITRATE 5% 5.8; 57.5 MG/ML; MG/ML
1 GEL, DENTIFRICE DENTAL
COMMUNITY
Start: 2021-12-13 | End: 2023-05-12

## 2022-01-10 RX ORDER — ACETAMINOPHEN 500 MG
1000 TABLET ORAL ONCE
Status: COMPLETED | OUTPATIENT
Start: 2022-01-10 | End: 2022-01-10

## 2022-01-10 RX ORDER — SODIUM CHLORIDE 9 MG/ML
INJECTION, SOLUTION INTRAVENOUS CONTINUOUS
Status: DISCONTINUED | OUTPATIENT
Start: 2022-01-10 | End: 2022-01-10 | Stop reason: HOSPADM

## 2022-01-10 RX ORDER — LIDOCAINE HYDROCHLORIDE 20 MG/ML
INJECTION, SOLUTION EPIDURAL; INFILTRATION; INTRACAUDAL; PERINEURAL PRN
Status: DISCONTINUED | OUTPATIENT
Start: 2022-01-10 | End: 2022-01-10 | Stop reason: SURG

## 2022-01-10 RX ORDER — HYDRALAZINE HYDROCHLORIDE 20 MG/ML
5 INJECTION INTRAMUSCULAR; INTRAVENOUS
Status: DISCONTINUED | OUTPATIENT
Start: 2022-01-10 | End: 2022-01-10 | Stop reason: HOSPADM

## 2022-01-10 RX ORDER — GABAPENTIN 300 MG/1
300 CAPSULE ORAL PRN
COMMUNITY
Start: 2022-01-03 | End: 2022-05-03

## 2022-01-10 RX ORDER — SODIUM CHLORIDE, SODIUM LACTATE, POTASSIUM CHLORIDE, CALCIUM CHLORIDE 600; 310; 30; 20 MG/100ML; MG/100ML; MG/100ML; MG/100ML
INJECTION, SOLUTION INTRAVENOUS
Status: DISCONTINUED | OUTPATIENT
Start: 2022-01-10 | End: 2022-01-10 | Stop reason: SURG

## 2022-01-10 RX ORDER — ERGOCALCIFEROL 1.25 MG/1
50000 CAPSULE ORAL SEE ADMIN INSTRUCTIONS
COMMUNITY
Start: 2021-08-01 | End: 2023-08-16

## 2022-01-10 RX ORDER — BUPIVACAINE HYDROCHLORIDE 2.5 MG/ML
INJECTION, SOLUTION EPIDURAL; INFILTRATION; INTRACAUDAL
Status: DISCONTINUED | OUTPATIENT
Start: 2022-01-10 | End: 2022-01-10 | Stop reason: HOSPADM

## 2022-01-10 RX ORDER — OXYCODONE HCL 5 MG/5 ML
10 SOLUTION, ORAL ORAL
Status: DISCONTINUED | OUTPATIENT
Start: 2022-01-10 | End: 2022-01-10 | Stop reason: HOSPADM

## 2022-01-10 RX ORDER — OXYCODONE HCL 5 MG/5 ML
5 SOLUTION, ORAL ORAL
Status: DISCONTINUED | OUTPATIENT
Start: 2022-01-10 | End: 2022-01-10 | Stop reason: HOSPADM

## 2022-01-10 RX ADMIN — FENTANYL CITRATE 100 MCG: 50 INJECTION, SOLUTION INTRAMUSCULAR; INTRAVENOUS at 10:19

## 2022-01-10 RX ADMIN — SODIUM CHLORIDE, POTASSIUM CHLORIDE, SODIUM LACTATE AND CALCIUM CHLORIDE: 600; 310; 30; 20 INJECTION, SOLUTION INTRAVENOUS at 10:18

## 2022-01-10 RX ADMIN — PROPOFOL 150 MG: 10 INJECTION, EMULSION INTRAVENOUS at 10:19

## 2022-01-10 RX ADMIN — SODIUM CHLORIDE, POTASSIUM CHLORIDE, SODIUM LACTATE AND CALCIUM CHLORIDE: 600; 310; 30; 20 INJECTION, SOLUTION INTRAVENOUS at 09:55

## 2022-01-10 RX ADMIN — LIDOCAINE HYDROCHLORIDE 50 MG: 20 INJECTION, SOLUTION EPIDURAL; INFILTRATION; INTRACAUDAL at 10:19

## 2022-01-10 RX ADMIN — ACETAMINOPHEN 1000 MG: 500 TABLET ORAL at 09:55

## 2022-01-10 ASSESSMENT — PAIN DESCRIPTION - PAIN TYPE
TYPE: SURGICAL PAIN;ACUTE PAIN
TYPE: ACUTE PAIN;SURGICAL PAIN

## 2022-01-10 NOTE — OR NURSING
1145 - Arrived to Phase II, VSS, denies nausea, pain 0/10. Surgical site L chest with transparent film, small amount serosanguinous drainage. Tolerating PO. Alarms on and set to appropriate parameters. Call light within reach, rounding in place.    1215 - Discharge instructions reviewed with pt, getting dressed.    1230 - Ambulating in hallway, IV removed.    1235 - Ride has arrived, pt discharged via wheelchair and CNA escort.

## 2022-01-10 NOTE — ANESTHESIA PREPROCEDURE EVALUATION
Case: [id number] Date/Time: 01/10/22 1130    Procedure: EXCISION, MASS - CHEST WALL    Pre-op diagnosis: PERSONAL HISTORY OF PRIMARY MALIGNANT NEOPLASM OF BREAST    Location: [facility] OR 08 / SURGERY [facility]    Surgeons: Sophy Holbrook M.D.          Relevant Problems   ANESTHESIA   (positive) CASSIE (obstructive sleep apnea)       Physical Exam    Airway   Mallampati: II  TM distance: >3 FB  Neck ROM: full       Cardiovascular - normal exam  Rhythm: regular  Rate: normal  (-) murmur     Dental - normal exam           Pulmonary - normal exam  Breath sounds clear to auscultation     Abdominal    Neurological - normal exam                 Anesthesia Plan    ASA 2       Plan - general       Airway plan will be LMA        Plan Factors:   Patient was previously instructed to abstain from smoking on day of procedure.  Patient did not smoke on day of procedure.      Induction: intravenous    Postoperative Plan: Postoperative administration of opioids is intended.    Pertinent diagnostic labs and testing reviewed    Informed Consent:    Anesthetic plan and risks discussed with patient.    Use of blood products discussed with: patient whom consented to blood products.

## 2022-01-10 NOTE — ANESTHESIA PROCEDURE NOTES
Airway    Date/Time: 1/10/2022 10:22 AM  Performed by: Janeth Brannon M.D.  Authorized by: Janeth Brannon M.D.     Location:  OR  Urgency:  Elective  Indications for Airway Management:  Anesthesia      Spontaneous Ventilation: absent    Sedation Level:  Deep  Preoxygenated: Yes    Mask Difficulty Assessment:  0 - not attempted  Final Airway Type:  Supraglottic airway  Final Supraglottic Airway:  Standard LMA    SGA Size:  4  Number of Attempts at Approach:  1

## 2022-01-10 NOTE — ANESTHESIA POSTPROCEDURE EVALUATION
Patient: Tiarra Kaplan Chavez    Procedure Summary     Date: 01/10/22 Room / Location: [facility] 08 / SURGERY [facility]    Anesthesia Start: 1018 Anesthesia Stop: 1043    Procedure: EXCISION, MASS - CHEST WALL (Left Chest) Diagnosis: (PERSONAL HISTORY OF PRIMARY MALIGNANT NEOPLASM OF BREAST)    Surgeons: Sophy Holbrook M.D. Responsible Provider: Janeth Brannon M.D.    Anesthesia Type: general ASA Status: 2          Final Anesthesia Type: general  Last vitals  BP   Blood Pressure: (!) 88/51    Temp   36 °C (96.8 °F)    Pulse   61   Resp   (!) 35    SpO2   99 %      Anesthesia Post Evaluation    Patient location during evaluation: PACU  Patient participation: complete - patient participated  Level of consciousness: awake and alert    Airway patency: patent  Anesthetic complications: no  Cardiovascular status: hemodynamically stable  Respiratory status: acceptable  Hydration status: euvolemic    PONV: none          No complications documented.     Nurse Pain Score: 0 (NPRS)

## 2022-01-10 NOTE — ANESTHESIA TIME REPORT
Anesthesia Start and Stop Event Times     Date Time Event    1/10/2022 1018 Anesthesia Start     1043 Anesthesia Stop        Responsible Staff  01/10/22    Name Role Begin End    Janeth Brannon M.D. Anesth 1018 1043        Preop Diagnosis (Free Text):  Pre-op Diagnosis     PERSONAL HISTORY OF PRIMARY MALIGNANT NEOPLASM OF BREAST        Preop Diagnosis (Codes):    Premium Reason  Non-Premium    Comments:

## 2022-01-10 NOTE — OP REPORT
DATE OF SERVICE:  01/10/2022     PREOPERATIVE DIAGNOSIS:  Left chest wall mass with history of right-sided   breast cancer, status post mastectomy.     POSTOPERATIVE DIAGNOSIS:  Left chest wall mass with history of right-sided   breast cancer, status post mastectomy.     PROCEDURE:  Excision of 1 cm chest wall mass of the left chest.     SURGEON:  Sophy Jonas MD     ASSISTANT:  JOANN Rivas     ANESTHESIA:  Laryngeal mask.     ANESTHESIOLOGIST:  Janeth Brannon MD     INDICATIONS:  The patient is a 41-year-old female who had right breast cancer   requiring treatment as it was bilateral mastectomy.  She now has developed a   chest wall mass on the left chest.  She is being brought at this time for   Excision.The indications for a surgical assistant in this surgery were indicated due to complexity of the procedure. Their role included aiding in incision, retraction, holding devices and closure of the wound.        FINDINGS:  A 1 cm cystic mass removed in its entirety.     DESCRIPTION OF PROCEDURE:  The patient was identified and consented, she was   brought to the operating room and placed in supine position.  The patient   underwent laryngeal mask anesthetic clearance.  The patient's chest was   prepped and draped in sterile fashion.  A small transverse incision was made   over the mass using electrocautery. Subcutaneous tissue was dissected down and   excised using electrocautery in its entirety. Wound was irrigated.    Hemostasis acquired.  Wound was closed with 3-0 Vicryl subcutaneous layer and   skin was closed with running 4-0 Vicryl.  Steri-Strip and dry dressing placed   on the wound.  The patient was extubated and taken to recovery room in stable   condition.  All sponge and needle counts were correct.        ______________________________  SOPHY JONAS MD    [facility]      DD:  01/10/2022 10:31  DT:  01/10/2022 10:52    Job#:  [id number]    CC:MD Sharif Verma DO

## 2022-01-10 NOTE — DISCHARGE INSTRUCTIONS
ACTIVITY: Rest and take it easy for the first 24 hours.  A responsible adult is recommended to remain with you during that time.  It is normal to feel sleepy.  We encourage you to not do anything that requires balance, judgment or coordination.    MILD FLU-LIKE SYMPTOMS ARE NORMAL. YOU MAY EXPERIENCE GENERALIZED MUSCLE ACHES, THROAT IRRITATION, HEADACHE AND/OR SOME NAUSEA.    FOR 24 HOURS DO NOT:  Drive, operate machinery or run household appliances.  Drink beer or alcoholic beverages.   Make important decisions or sign legal documents.    SPECIAL INSTRUCTIONS:     DIET: To avoid nausea, slowly advance diet as tolerated, avoiding spicy or greasy foods for the first day.  Add more substantial food to your diet according to your physician's instructions.  Babies can be fed formula or breast milk as soon as they are hungry.  INCREASE FLUIDS AND FIBER TO AVOID CONSTIPATION.    SURGICAL DRESSING/BATHING: Keep dressings clean, dry, and intact. Ok to shower over dressing. Pat dry. Do not submerge the site, no bath or hot tub, until cleared by your physician.    FOLLOW-UP APPOINTMENT:  A follow-up appointment should be arranged with your doctor in 1-2 weeks; call to schedule.    You should CALL YOUR PHYSICIAN if you develop:  Fever greater than 101 degrees F.  Pain not relieved by medication, or persistent nausea or vomiting.  Excessive bleeding (blood soaking through dressing) or unexpected drainage from the wound.  Extreme redness or swelling around the incision site, drainage of pus or foul smelling drainage.  Inability to urinate or empty your bladder within 8 hours.  Problems with breathing or chest pain.    You should call 911 if you develop problems with breathing or chest pain.  If you are unable to contact your doctor or surgical center, you should go to the nearest emergency room or urgent care center.  Physician's telephone #: Dr. Holbrook, [phone]    If any questions arise, call your doctor.  If your doctor  is not available, please feel free to call the Surgical Center at [phone].     A registered nurse may call you a few days after your surgery to see how you are doing after your procedure.    MEDICATIONS: Resume taking daily medication.  Take prescribed pain medication with food.  If no medication is prescribed, you may take non-aspirin pain medication if needed.  PAIN MEDICATION CAN BE VERY CONSTIPATING.  Take a stool softener or laxative such as senokot, pericolace, or milk of magnesia if needed.    If your physician has prescribed pain medication that includes Acetaminophen (Tylenol), do not take additional Acetaminophen (Tylenol) while taking the prescribed medication.    Depression / Suicide Risk    As you are discharged from this [facility] facility, it is important to learn how to keep safe from harming yourself.    Recognize the warning signs:  · Abrupt changes in personality, positive or negative- including increase in energy   · Giving away possessions  · Change in eating patterns- significant weight changes-  positive or negative  · Change in sleeping patterns- unable to sleep or sleeping all the time   · Unwillingness or inability to communicate  · Depression  · Unusual sadness, discouragement and loneliness  · Talk of wanting to die  · Neglect of personal appearance   · Rebelliousness- reckless behavior  · Withdrawal from people/activities they love  · Confusion- inability to concentrate     If you or a loved one observes any of these behaviors or has concerns about self-harm, here's what you can do:  · Talk about it- your feelings and reasons for harming yourself  · Remove any means that you might use to hurt yourself (examples: pills, rope, extension cords, firearm)  · Get professional help from the community (Mental Health, Substance Abuse, psychological counseling)  · Do not be alone:Call your Safe Contact- someone whom you trust who will be there for you.  · Call your local CRISIS HOTLINE  216-2229 or 308-837-2338  · Call your local Children's Mobile Crisis Response Team [place] (863) 574-0219 or www.[employer].Broadcastr  · Call the toll free National Suicide Prevention Hotlines   · National Suicide Prevention Lifeline 565-550-VAYM (7515)  · National [employer] Line Network 800-SUICIDE (857-7197)

## 2022-01-11 NOTE — PROGRESS NOTES
CC: HST results        HPI:  Tiarra Chavez is a 41 [facility] kindly referred by Dr. Juan Lopez MD and for seen via telemedicine on 12/2/2021.  Symptoms included infrequent resuscitative snorts, shortness of breath, nonrestorative sleep, nocturnal awakenings, tiredness, and napping.    Her home sleep apnea test was performed on 1/6/2020 Brittany showed mild to moderate obstructive sleep apnea with an GINA of 13.2, corey saturation of 82%, and saturations less than or equal to 88% for 2% of the monitoring time.  Significant comorbidities modifying factors include palpitations, mastectomy for breast cancer status post chemo, never smoker, macrocytosis, up-to-date with 2021 influenza vaccination, up-to-date with SARS-CoV-2 vaccination, B12 deficiency, and vitamin D deficiency.        Patient Active Problem List    Diagnosis Date Noted   • CASSIE (obstructive sleep apnea) 12/02/2021   • Condition influencing health status 04/28/2021   • B12 deficiency 11/30/2020   • Adjustment disorder with mixed anxiety and depressed mood 10/05/2020   • Grief counseling 10/05/2020   • Malignant neoplasm of lower-inner quadrant of right female breast (HCC) 03/27/2020   • Hypovitaminosis D 10/30/2019   • Macrocytosis 04/26/2019   • Macromastia 04/26/2019   • Pernicious anemia 12/20/2017   • MICHELLE (generalized anxiety disorder) 10/25/2017   • Health care maintenance 10/25/2017       Past Medical History:   Diagnosis Date   • Allergic rhinitis 4/7/2011   • Cancer (HCC) 2020    breast right   • Headache(784.0)     When in school, not a problem now.  With sleep deprivation.   • Pernicious anemia    • Snoring    • Urinary incontinence     minor        Past Surgical History:   Procedure Laterality Date   • MASS EXCISION GENERAL Left 1/10/2022    Procedure: EXCISION, MASS - CHEST WALL;  Surgeon: Sophy Holbrook M.D.;  Location: SURGERY [facility];  Service: General   • PB MASTECTOMY, SIMPLE, COMPLETE Bilateral 3/26/2020     "Procedure: MASTECTOMY;  Surgeon: Sophy Holbrook M.D.;  Location: SURGERY [facility];  Service: General   • NODE BIOPSY SENTINEL Right 3/26/2020    Procedure: BIOPSY, LYMPH NODE, SENTINEL;  Surgeon: Sophy Holbrook M.D.;  Location: SURGERY [facility];  Service: General   • BREAST BIOPSY Right 01/06/2020    Rt Breast Bx   • VAGINAL PERINEAL EXAM REPAIR  5/14/2012    Performed by HUSSAIN RANGEL at LABOR AND DELIVERY   • LUMPECTOMY  2001    \"[facility]\"   • OTHER Left 1981    laceration repair  left leg       Family History   Problem Relation Age of Onset   • Hypertension Mother    • Thyroid Mother         Hypothyroid   • Hyperlipidemia Father    • Cancer Father         T cell lymphoma   • Heart Disease Maternal Grandfather         MI 40 yo, unknown RF.   • Cancer Paternal Uncle         lymphoma       Social History     Socioeconomic History   • Marital status: Single     Spouse name: Not on file   • Number of children: Not on file   • Years of education: Not on file   • Highest education level: Not on file   Occupational History   • Not on file   Tobacco Use   • Smoking status: Never Smoker   • Smokeless tobacco: Never Used   Vaping Use   • Vaping Use: Never used   Substance and Sexual Activity   • Alcohol use: Never   • Drug use: Yes     Comment: CBD once a month as needed   • Sexual activity: Not Currently     Partners: Male     Birth control/protection: Condom, Pill   Other Topics Concern   • Not on file   Social History Narrative   • Not on file     Social Determinants of Health     Financial Resource Strain:    • Difficulty of Paying Living Expenses: Not on file   Food Insecurity:    • Worried About Running Out of Food in the Last Year: Not on file   • Ran Out of Food in the Last Year: Not on file   Transportation Needs:    • Lack of Transportation (Medical): Not on file   • Lack of Transportation (Non-Medical): Not on file   Physical Activity:    • Days of Exercise per Week: Not on file   • " "Minutes of Exercise per Session: Not on file   Stress:    • Feeling of Stress : Not on file   Social Connections:    • Frequency of Communication with Friends and Family: Not on file   • Frequency of Social Gatherings with Friends and Family: Not on file   • Attends [employer] Services: Not on file   • Active Member of Clubs or Organizations: Not on file   • Attends Club or Organization Meetings: Not on file   • Marital Status: Not on file   Intimate Partner Violence:    • Fear of Current or Ex-Partner: Not on file   • Emotionally Abused: Not on file   • Physically Abused: Not on file   • Sexually Abused: Not on file   Housing Stability:    • Unable to Pay for Housing in the Last Year: Not on file   • Number of Places Lived in the Last Year: Not on file   • Unstable Housing in the Last Year: Not on file       Current Outpatient Medications   Medication Sig Dispense Refill   • naproxen (ALEVE) 220 MG tablet Take 220 mg by mouth 2 times a day as needed (For pain).     • ergocalciferol (DRISDOL) 91532 UNIT capsule Take 50,000 Units by mouth see administration instructions. On Sunday and Wed     • gabapentin (NEURONTIN) 300 MG Cap Take 300 mg by mouth as needed. Indications: Neuropathic Pain     • SODIUM FLUORIDE 5000 SENSITIVE 1.1-5 % Gel Take 1 Application by mouth at bedtime.     • cyanocobalamin (VITAMIN B-12) 1000 MCG/ML Solution INJECT 1 ML INTRAMUSCULARLY  ONCE A WEEK (Patient taking differently: Inject 1,000 mcg as directed every 7 days. INJECT 1 ML INTRAMUSCULARLY  ONCE A WEEK, on Monday) 12 mL 0   • tamoxifen (NOLVADEX) 20 MG tablet Take 20 mg by mouth every 48 hours.     • Multiple Vitamin (MULTI-VITAMIN DAILY PO) Take 1 Tablet by mouth every day.     • Cyanocobalamin (VITAMIN B 12 PO) Take 1 Tab by mouth every day.     • folic acid (FOLVITE) 1 MG TABS Take 1 mg by mouth every day.       No current facility-administered medications for this visit.    \"CURRENT RX\"    ALLERGIES: Shellfish allergy and " Nickel    ROS    Constitutional: Denies fever, chills, sweats,  weight loss, fatigue  Cardiovascular: Denies chest pain, tightness, palpitations, swelling in legs/feet, fainting, difficulty breathing when lying down but gets better when sitting up.   Respiratory: Denies shortness of breath, cough, sputum, wheezing, painful breathing, coughing up blood.   Sleep: per HPI  Gastrointestinal: Denies  difficulty swallowing, nausea, abdominal pain, diarrhea, constipation, heartburn.  Musculoskeletal: Denies painful joints, sore muscles, back pain.        PHYSICAL EXAM      There were no vitals taken for this visit.  Appearance: Well-nourished, well-developed, no acute distress  Eyes:  PERRLA, EOMI  ENMT: masked  Neck: Supple, trachea midline, no masses  Respiratory effort:  No intercostal retractions or use of accessory muscles  Lung auscultation:  No wheezes rhonchi rubs or rales  Cardiac: No murmurs, rubs, or gallops; regular rhythm, normal rate; no edema  Abdomen:  No tenderness, no organomegaly.  Musculoskeletal:  Grossly normal; gait and station normal; digits and nails normal  Skin:  No rashes, petechiae, cyanosis  Neurologic: without focal signs; oriented to person, time, place, and purpose; judgement intact  Psychiatric:  No depression, anxiety, agitation      Medical Decision Making       The medical record was reviewed in its entirety including the referral notes, records from primary care, consultants notes, hospital records, lab, imaging, microbiology, immunology, and immunizations.  Care gaps identified and reviewed with the patient.    Diagnostic and titration nocturnal polysomnogram's, home sleep apnea tests, continuous nocturnal oximetry results, multiple sleep latency tests, and compliance reports reviewed.  There are no diagnoses linked to this encounter.    PLAN:   Her home sleep apnea test was performed on 1/6/2020 June showed mild to moderate obstructive sleep apnea with an GINA of 13.2, corey  saturation of 82%, and saturations less than or equal to 88% for 2% of the monitoring time.    The risks of untreated sleep apnea were discussed with the patient at length. Patients with CASSIE are at increased risk of cardiovascular disease including coronary artery disease, systemic arterial hypertension, pulmonary arterial hypertension, cardiac arrythmias, and stroke. CASSIE patients have an increased risk of motor vehicle accidents, type 2 diabetes, chronic kidney disease, and non-alcoholic liver disease. The patient was advised to avoid driving a motor vehicle when drowsy.    Positive airway pressure will favorably impact many of the adverse conditions and effects provoked by CASSIE.    Have advised the patient to follow up with the appropriate healthcare practitioners for all other medical problems and issues.    Mask wearing, handwashing, and social distancing protocols reviewed and encouraged.    No follow-ups on file.      Total time 30 minutes

## 2022-01-12 ENCOUNTER — TELEMEDICINE (OUTPATIENT)
Dept: SLEEP MEDICINE | Facility: MEDICAL CENTER | Age: 42
End: 2022-01-12
Payer: COMMERCIAL

## 2022-01-12 VITALS — BODY MASS INDEX: 24.33 KG/M2 | WEIGHT: 155 LBS | HEIGHT: 67 IN

## 2022-01-12 DIAGNOSIS — G47.33 OSA (OBSTRUCTIVE SLEEP APNEA): ICD-10-CM

## 2022-01-12 PROCEDURE — 99214 OFFICE O/P EST MOD 30 MIN: CPT | Mod: 95 | Performed by: INTERNAL MEDICINE

## 2022-01-12 NOTE — PROGRESS NOTES
Telemedicine Visit: New Patient     This encounter was conducted via Zoom using secure and encrypted video conferencing technology.   The patient was in a private location  (POS  10  in the state of [place].The patient's identity was confirmed and verbal consent was obtained for this virtual visit.    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  Symptoms included infrequent resuscitative snorts, shortness of breath, nonrestorative sleep, nocturnal awakenings, tiredness, and napping.    Her home sleep apnea test was performed on 1/6/2020 June showed mild to moderate obstructive sleep apnea with an GINA of 13.2, corey saturation of 82%, and saturations less than or equal to 88% for 2% of the monitoring time.      Significant comorbidities modifying factors include palpitations, mastectomy for breast cancer status post chemo, recent excision of a chest wall mass (path showed fibroadipose tissue, no malignancy),never smoker, macrocytosis, up-to-date with 2021 influenza vaccination, up-to-date with SARS-CoV-2 vaccination, B12 deficiency, and vitamin D deficiency.    ROS  See HPI  Constitutional: Negative for fever, chills and malaise/fatigue.   HENT: Negative for congestion.    Eyes: Negative for pain.   Respiratory: Negative for cough and shortness of breath.    Cardiovascular: Negative for leg swelling.   Gastrointestinal: Negative for nausea, vomiting, abdominal pain and diarrhea.   Genitourinary: Negative for dysuria and hematuria.   Skin: Negative for rash.   Neurological: Negative for dizziness, focal weakness and headaches.   Endo/Heme/Allergies: Does not bruise/bleed easily.   Psychiatric/Behavioral: Negative for depression.  The patient is not nervous/anxious.      Allergies   Allergen Reactions   • Shellfish Allergy Hives   • Nickel Itching     rash       Current medicines (including  changes today)  Current Outpatient Medications   Medication Sig Dispense Refill   • naproxen (ALEVE) 220 MG tablet Take 220 mg by mouth 2 times a day as needed (For pain).     • ergocalciferol (DRISDOL) 53243 UNIT capsule Take 50,000 Units by mouth see administration instructions. On  and Wed     • gabapentin (NEURONTIN) 300 MG Cap Take 300 mg by mouth as needed. Indications: Neuropathic Pain     • SODIUM FLUORIDE 5000 SENSITIVE 1.1-5 % Gel Take 1 Application by mouth at bedtime.     • cyanocobalamin (VITAMIN B-12) 1000 MCG/ML Solution INJECT 1 ML INTRAMUSCULARLY  ONCE A WEEK (Patient taking differently: Inject 1,000 mcg as directed every 7 days. INJECT 1 ML INTRAMUSCULARLY  ONCE A WEEK, on Monday) 12 mL 0   • tamoxifen (NOLVADEX) 20 MG tablet Take 20 mg by mouth every 48 hours.     • Multiple Vitamin (MULTI-VITAMIN DAILY PO) Take 1 Tablet by mouth every day.     • Cyanocobalamin (VITAMIN B 12 PO) Take 1 Tab by mouth every day.     • folic acid (FOLVITE) 1 MG TABS Take 1 mg by mouth every day.       No current facility-administered medications for this visit.       She  has a past medical history of Allergic rhinitis (2011), Cancer ([facility]) (), Headache(784.0), Pernicious anemia, Snoring, and Urinary incontinence.  She  has a past surgical history that includes lumpectomy (); vaginal perineal exam repair (2012); other (Left, ); pr mastectomy, simple, complete (Bilateral, 3/26/2020); node biopsy sentinel (Right, 3/26/2020); breast biopsy (Right, 2020); and mass excision general (Left, 1/10/2022).      Family History   Problem Relation Age of Onset   • Hypertension Mother    • Thyroid Mother         Hypothyroid   • Hyperlipidemia Father    • Cancer Father         T cell lymphoma   • Heart Disease Maternal Grandfather         MI 40 yo, unknown RF.   • Cancer Paternal Uncle         lymphoma     Family Status   Relation Name Status   • Mo Hyperthyroidism Alive        59yo   • Fa      • MGFa  (Not Specified)   • PUnc  (Not Specified)       Patient Active Problem List    Diagnosis Date Noted   • CASSIE (obstructive sleep apnea) 12/02/2021   • Condition influencing health status 04/28/2021   • B12 deficiency 11/30/2020   • Adjustment disorder with mixed anxiety and depressed mood 10/05/2020   • Grief counseling 10/05/2020   • Malignant neoplasm of lower-inner quadrant of right female breast (HCC) 03/27/2020   • Hypovitaminosis D 10/30/2019   • Macrocytosis 04/26/2019   • Macromastia 04/26/2019   • Pernicious anemia 12/20/2017   • MICHELLE (generalized anxiety disorder) 10/25/2017   • Health care maintenance 10/25/2017          Objective:   Weight 70.7 kg  BMI 25.16 kg/m²  Respiratory rate 20  Last blood pressure 119/80  Last heart rate 56    Physical Exam:  Constitutional: Alert, no distress, well-groomed.  Skin: No rashes in visible areas.  Eye: Round. Conjunctiva clear, lids normal. No icterus.   ENMT: Lips pink without lesions, good dentition, moist mucous membranes. Phonation normal.  Neck: No masses, no thyromegaly. Moves freely without pain.  CV: Pulse as reported by patient  Respiratory: Unlabored respiratory effort, no cough or audible wheeze  Psych: Alert and oriented x3, normal affect and mood.       Medical Decision Making           The medical record was reviewed in its entirety including the referral notes, records from primary care, consultants notes, hospital records, labs, imaging, microbiology, immunology, and immunizations.  Care gaps identified and reviewed with the patient.    Diagnostic and titration nocturnal polysomnogram's, home sleep apnea tests, continuous nocturnal oximetry results, multiple sleep latency tests, and compliance reports reviewed.    Her home sleep apnea test was performed on 1/6/2022 showed mild to moderate obstructive sleep apnea with an GINA of 13.2, corey saturation of 82%, and saturations less than or equal to 88% for 2% of the monitoring time.    Options  include application of positive airway pressure, a dental device mandibular repositioning, behavior modification, or nasopharyngeal reconstructive surgery.  We could try auto titrating CPAP or order an attended positive airway pressure titration.  We will order a trial of auto titrating CPAP 5-15 cm water using a mask of choice and heated modification followed by clinical and compliance review in 31 to 90 days after she receives her equipment.  Chances are quite good that she will do well with this.    If she develops treatment emergent central apnea we would need to have her come in for an ASV titration.          The risks of untreated sleep apnea were discussed with the patient at length. Patients with CASSIE are at increased risk of cardiovascular disease including coronary artery disease, systemic arterial hypertension, pulmonary arterial hypertension, cardiac arrythmias, and stroke. CASSIE patients have an increased risk of motor vehicle accidents, type 2 diabetes, chronic kidney disease, and non-alcoholic liver disease. The patient was advised to avoid driving a motor vehicle when drowsy.    Positive airway pressure, such as CPAP, is considered first-line and preferred therapy for sleep apnea and may reverse both symptoms and risks.    Have advised the patient to follow up with the appropriate healthcare practitioners for all other medical problems and issues.      The following treatment plan was discussed:     There are no diagnoses linked to this encounter.    Time spent 45 minutes. More than 1/2 the time spent coordinating care, counseling the patient, and reviewing the pathophysiology and treatment options for sleep apnea and the patient specific modifying factors and significant co-morbidities.        Follow-up: RTC after Sleep Study

## 2022-01-21 ENCOUNTER — PATIENT MESSAGE (OUTPATIENT)
Dept: SLEEP MEDICINE | Facility: MEDICAL CENTER | Age: 42
End: 2022-01-21

## 2022-01-21 DIAGNOSIS — G47.33 OSA (OBSTRUCTIVE SLEEP APNEA): ICD-10-CM

## 2022-01-24 ENCOUNTER — TELEMEDICINE (OUTPATIENT)
Dept: BEHAVIORAL HEALTH | Facility: CLINIC | Age: 42
End: 2022-01-24
Payer: COMMERCIAL

## 2022-01-24 DIAGNOSIS — F41.1 GAD (GENERALIZED ANXIETY DISORDER): ICD-10-CM

## 2022-01-24 DIAGNOSIS — F43.23 ADJUSTMENT DISORDER WITH MIXED ANXIETY AND DEPRESSED MOOD: ICD-10-CM

## 2022-01-24 PROCEDURE — 90837 PSYTX W PT 60 MINUTES: CPT | Performed by: SOCIAL WORKER

## 2022-01-24 ASSESSMENT — PATIENT HEALTH QUESTIONNAIRE - PHQ9
5. POOR APPETITE OR OVEREATING: 2 - MORE THAN HALF THE DAYS
CLINICAL INTERPRETATION OF PHQ2 SCORE: 3
SUM OF ALL RESPONSES TO PHQ QUESTIONS 1-9: 15

## 2022-01-24 NOTE — PROGRESS NOTES
"[facility]   Therapy Progress Note        Name: Tiarra Chavez  MRN: [id number]  : 1980  Age: 41 y.o.  Date of assessment: 2022  PCP: Sharif Wen D.O.  Persons in attendance: Patient  Total session time: 55 minutes      Topics addressed in psychotherapy include: Pt to video indiv appt. This is anew pt to this writer, so history was also gathered.   Pt reports she is doing ok, not at work for 9 days, previously had not had a day off since 2021. 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  Pt asked to work remotely, however is in midst of lawsuit and was denied. She fought med error in past with [facility]. Pt states she has recently been overly emotional, crying and cant make it stop.   Pt is single parent with 8 yo son, father and she parent together, due to schedules he takes son most nights, no child support in place, however he pays after school care, and he stepped up when she was in cancer tx. Son obese, mo \"does everything,\" having sme memory issues possibly due to bilateral breast cancer tx , on estrogen replacement and cancer drugs. Attends 3 support groups on line, feels she is watching excessive TV, has sleep apnea, middle insomnia, and gets 6 hrs sleep/night  Pt reports suicidal behaviors at 12 and 25, worked on suicide line for 12 yrs, no plan, ideation only. Pt has friend network, mother and bro in [place], sister [place], do not share personal information. Sister will get son if something happens.Txting, groups, rotary club, feel connected, variety of responses.   Referred to Breast cancer/[facility].   Talk therapy did not work in past, pt requesting homework.     Objective Observations:   Participation:Active verbal participation and Engaged   Grooming:Casual   Cognition:Fully Oriented   Eye Contact:Good   Mood:Depressed and Anxious   Affect:Flexible, Congruent with content and Tearful   Thought Process:Goal-directed   Speech:Rate " within normal limits and Volume within normal limits    Current Risk:   Suicide: low   Homicide: NA   Self-Harm: NA   Relapse: NA   Safety Plan Reviewed: NA    Care Plan Updated: No    Does patient express agreement with the above plan? Yes     Diagnosis:  1. Adjustment Disorder with mixed anxiety and depression  2. MICHELLE  3. Grief/loss  Therapeutic Intervention(s): Goal-setting, Parenting/familial roles addressed, Stressors assessed and Supportive psychotherapy    Treatment Goal(s)/Objective(s) addressed: developing strategies     Progress toward Treatment Goals: No change    Referral appointment(s) scheduled? Yes , breast cancer support      Iwona Acuna, LCSW, MSW

## 2022-02-02 RX ORDER — MODAFINIL 100 MG/1
100 TABLET ORAL DAILY
Qty: 30 TABLET | Refills: 0 | Status: SHIPPED | OUTPATIENT
Start: 2022-02-02 | End: 2022-03-04

## 2022-02-14 ENCOUNTER — TELEPHONE (OUTPATIENT)
Dept: SLEEP MEDICINE | Facility: MEDICAL CENTER | Age: 42
End: 2022-02-14
Payer: COMMERCIAL

## 2022-02-14 NOTE — TELEPHONE ENCOUNTER
MEDICATION PRIOR AUTHORIZATION NEEDED:    1. Name of Medication: Modafinil 100 mg    2. Requested By (Name of Pharmacy): [facility]     3. Is insurance on file current? yes    4. What is the name & phone number of the 3rd party payor? Aj   Telemetry

## 2022-05-03 ENCOUNTER — OFFICE VISIT (OUTPATIENT)
Dept: CARDIOLOGY | Facility: MEDICAL CENTER | Age: 42
End: 2022-05-03
Payer: COMMERCIAL

## 2022-05-03 ENCOUNTER — NON-PROVIDER VISIT (OUTPATIENT)
Dept: CARDIOLOGY | Facility: MEDICAL CENTER | Age: 42
End: 2022-05-03
Attending: INTERNAL MEDICINE
Payer: COMMERCIAL

## 2022-05-03 VITALS
RESPIRATION RATE: 14 BRPM | SYSTOLIC BLOOD PRESSURE: 130 MMHG | WEIGHT: 155 LBS | OXYGEN SATURATION: 98 % | DIASTOLIC BLOOD PRESSURE: 80 MMHG | HEART RATE: 75 BPM | HEIGHT: 67 IN | BODY MASS INDEX: 24.33 KG/M2

## 2022-05-03 DIAGNOSIS — R00.2 PALPITATIONS: Chronic | ICD-10-CM

## 2022-05-03 PROCEDURE — 99213 OFFICE O/P EST LOW 20 MIN: CPT | Performed by: INTERNAL MEDICINE

## 2022-05-03 ASSESSMENT — ENCOUNTER SYMPTOMS: PALPITATIONS: 1

## 2022-05-03 NOTE — PROGRESS NOTES
"Chief Complaint   Patient presents with   • Palpitations       Subjective     Tiarra Chavez is a 41 y.o. female who presents today for follow-up of her history of palpitations with history of breast cancer    She has been having some different complaints and palpitations she noticed that her resting heart rate was going up on her Fitbit sense of flushing over the chest starting in her right arm    Past Medical History:   Diagnosis Date   • Allergic rhinitis 4/7/2011   • Cancer (HCC) 2020    breast right   • Headache(784.0)     When in school, not a problem now.  With sleep deprivation.   • Palpitations    • Pernicious anemia    • Snoring    • Urinary incontinence     minor     Past Surgical History:   Procedure Laterality Date   • MASS EXCISION GENERAL Left 1/10/2022    Procedure: EXCISION, MASS - CHEST WALL;  Surgeon: Sophy Holbrook M.D.;  Location: SURGERY [facility];  Service: General   • PB MASTECTOMY, SIMPLE, COMPLETE Bilateral 3/26/2020    Procedure: MASTECTOMY;  Surgeon: Sophy Holbrook M.D.;  Location: SURGERY [facility];  Service: General   • NODE BIOPSY SENTINEL Right 3/26/2020    Procedure: BIOPSY, LYMPH NODE, SENTINEL;  Surgeon: Sophy Holbrook M.D.;  Location: SURGERY [facility];  Service: General   • BREAST BIOPSY Right 01/06/2020    Rt Breast Bx   • VAGINAL PERINEAL EXAM REPAIR  5/14/2012    Performed by HUSSAIN RANGEL at LABOR AND DELIVERY   • LUMPECTOMY  2001    \"[facility]\"   • OTHER Left 1981    laceration repair  left leg     Family History   Problem Relation Age of Onset   • Hypertension Mother    • Thyroid Mother         Hypothyroid   • Hyperlipidemia Father    • Cancer Father         T cell lymphoma   • Heart Disease Maternal Grandfather         MI 38 yo, unknown RF.   • Cancer Paternal Uncle         lymphoma     Social History     Socioeconomic History   • Marital status: Single     Spouse name: Not on file   • Number of children: Not on file   • Years of education: Not " on file   • Highest education level: Not on file   Occupational History   • Not on file   Tobacco Use   • Smoking status: Never Smoker   • Smokeless tobacco: Never Used   Vaping Use   • Vaping Use: Never used   Substance and Sexual Activity   • Alcohol use: Never   • Drug use: Yes     Types: Marijuana, Oral     Comment: couple times a week    • Sexual activity: Not Currently     Partners: Male     Birth control/protection: Condom, Pill   Other Topics Concern   • Not on file   Social History Narrative   • Not on file     Social Determinants of Health     Financial Resource Strain: Not on file   Food Insecurity: Not on file   Transportation Needs: Not on file   Physical Activity: Not on file   Stress: Not on file   Social Connections: Not on file   Intimate Partner Violence: Not on file   Housing Stability: Not on file     Allergies   Allergen Reactions   • Shellfish Allergy Hives   • Nickel Itching     rash     Outpatient Encounter Medications as of 5/3/2022   Medication Sig Dispense Refill   • naproxen (ANAPROX) 220 MG tablet Take 220 mg by mouth 2 times a day as needed (For pain).     • ergocalciferol (DRISDOL) 39631 UNIT capsule Take 50,000 Units by mouth see administration instructions. On Sunday and Wed     • SODIUM FLUORIDE 5000 SENSITIVE 1.1-5 % Gel Take 1 Application by mouth at bedtime.     • cyanocobalamin (VITAMIN B-12) 1000 MCG/ML Solution INJECT 1 ML INTRAMUSCULARLY  ONCE A WEEK (Patient taking differently: Inject 1,000 mcg as directed every 7 days. INJECT 1 ML INTRAMUSCULARLY  ONCE A WEEK, on Monday) 12 mL 0   • tamoxifen (NOLVADEX) 20 MG tablet Take 20 mg by mouth every 48 hours.     • Multiple Vitamin (MULTI-VITAMIN DAILY PO) Take 1 Tablet by mouth every day.     • Cyanocobalamin (VITAMIN B 12 PO) Take 1 Tab by mouth every day.     • folic acid (FOLVITE) 1 MG TABS Take 1 mg by mouth every day.     • gabapentin (NEURONTIN) 300 MG Cap Take 300 mg by mouth as needed. Indications: Neuropathic Pain  "(Patient not taking: Reported on 5/3/2022)       No facility-administered encounter medications on file as of 5/3/2022.     Review of Systems   Cardiovascular: Positive for chest pain and palpitations.              Objective     /80 (BP Location: Left arm, Patient Position: Sitting, BP Cuff Size: Adult)   Pulse 75   Resp 14   Ht 1.689 m (5' 6.5\")   Wt 70.3 kg (155 lb)   SpO2 98%   BMI 24.64 kg/m²     Physical Exam           Assessment & Plan     1. Palpitations  Cardiac Event Monitor       Medical Decision Making: Today's Assessment/Status/Plan:        It was my pleasure to meet with Ms. Chavez.    Blood pressure is well controlled.  She will continue to monitor and eat hearty healthy diet.  zio for palpiations    Can safely exercise    We will follow up with Ms. Chavez on the results of the testing over the phone. We will determine further follow-up from there.    Perhaps in the future she can establish with [facility]    It is my pleasure to participate in the care of Ms. Chavez.  Please do not hesitate to contact me with questions or concerns.    Sharif Encinas MD PhD FACC  Cardiologist [facility]    Please note that this dictation was created using voice recognition software. There may be errors I did not discover before finalizing the note.     5/3/2022  8:48 AM                "

## 2022-05-03 NOTE — PROGRESS NOTES
Patient enrolled in 14day XT Zio Patch program per Dr. Encinas.  In clinic hookup by Daryl  >Currently pending EOS.  Monitor serial number: [id number]

## 2022-05-20 ENCOUNTER — TELEPHONE (OUTPATIENT)
Dept: CARDIOLOGY | Facility: MEDICAL CENTER | Age: 42
End: 2022-05-20
Payer: COMMERCIAL

## 2022-05-23 ENCOUNTER — TELEPHONE (OUTPATIENT)
Dept: CARDIOLOGY | Facility: MEDICAL CENTER | Age: 42
End: 2022-05-23
Payer: COMMERCIAL

## 2022-05-23 NOTE — TELEPHONE ENCOUNTER
----- Message from Elly Triana R.N. sent at 5/20/2022  4:47 PM PDT -----  Hi Gila,     Please contact patient to review CW's recommendations.    Thank you!    ----- Message -----  From: Sharif Encinas M.D.  Sent: 5/20/2022   1:44 PM PDT  To: Elly Triana R.N.    The holter test looks good, please let her know     Thank you

## 2022-05-23 NOTE — LETTER
May 23, 2022        Tiarra [facility] [place]  [address] [id number]          Dear Tiarra,    We have received the results of your recent:    HOLTER MONITOR     Your test came back within normal limits.  Please follow up as previously discussed with your physician.      Feel free to call us with any questions.        Sincerely,          Gila Medical Assistant for Dr. Encinas  Electronically Signed

## 2022-06-27 ENCOUNTER — TELEMEDICINE (OUTPATIENT)
Dept: BEHAVIORAL HEALTH | Facility: CLINIC | Age: 42
End: 2022-06-27
Payer: COMMERCIAL

## 2022-06-27 DIAGNOSIS — F41.1 GAD (GENERALIZED ANXIETY DISORDER): ICD-10-CM

## 2022-06-27 DIAGNOSIS — F43.23 ADJUSTMENT DISORDER WITH MIXED ANXIETY AND DEPRESSED MOOD: ICD-10-CM

## 2022-06-27 PROCEDURE — 90837 PSYTX W PT 60 MINUTES: CPT | Mod: GT | Performed by: SOCIAL WORKER
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
## 2022-07-25 ENCOUNTER — HOSPITAL ENCOUNTER (OUTPATIENT)
Dept: LAB | Facility: MEDICAL CENTER | Age: 42
End: 2022-07-25
Attending: INTERNAL MEDICINE
Payer: COMMERCIAL

## 2022-07-25 ENCOUNTER — OFFICE VISIT (OUTPATIENT)
Dept: MEDICAL GROUP | Age: 42
End: 2022-07-25
Payer: COMMERCIAL

## 2022-07-25 VITALS
BODY MASS INDEX: 25.11 KG/M2 | TEMPERATURE: 97.1 F | SYSTOLIC BLOOD PRESSURE: 106 MMHG | WEIGHT: 160 LBS | HEART RATE: 70 BPM | OXYGEN SATURATION: 99 % | HEIGHT: 67 IN | DIASTOLIC BLOOD PRESSURE: 72 MMHG

## 2022-07-25 DIAGNOSIS — C50.311 MALIGNANT NEOPLASM OF LOWER-INNER QUADRANT OF RIGHT BREAST OF FEMALE, ESTROGEN RECEPTOR POSITIVE (HCC): ICD-10-CM

## 2022-07-25 DIAGNOSIS — Z00.00 HEALTH CARE MAINTENANCE: ICD-10-CM

## 2022-07-25 DIAGNOSIS — Z79.899 HIGH RISK MEDICATION USE: ICD-10-CM

## 2022-07-25 DIAGNOSIS — Z17.0 MALIGNANT NEOPLASM OF LOWER-INNER QUADRANT OF RIGHT BREAST OF FEMALE, ESTROGEN RECEPTOR POSITIVE (HCC): ICD-10-CM

## 2022-07-25 DIAGNOSIS — Z00.00 ANNUAL PHYSICAL EXAM: ICD-10-CM

## 2022-07-25 DIAGNOSIS — D75.89 MACROCYTOSIS: ICD-10-CM

## 2022-07-25 DIAGNOSIS — D51.0 PERNICIOUS ANEMIA: ICD-10-CM

## 2022-07-25 DIAGNOSIS — R53.82 CHRONIC FATIGUE: ICD-10-CM

## 2022-07-25 PROBLEM — N62 MACROMASTIA: Status: RESOLVED | Noted: 2019-04-26 | Resolved: 2022-07-25

## 2022-07-25 PROBLEM — E53.8 B12 DEFICIENCY: Status: RESOLVED | Noted: 2020-11-30 | Resolved: 2022-07-25

## 2022-07-25 PROBLEM — G47.33 OSA (OBSTRUCTIVE SLEEP APNEA): Status: RESOLVED | Noted: 2021-12-02 | Resolved: 2022-07-25

## 2022-07-25 PROBLEM — Z71.89 GRIEF COUNSELING: Status: RESOLVED | Noted: 2020-10-05 | Resolved: 2022-07-25

## 2022-07-25 PROBLEM — E55.9 HYPOVITAMINOSIS D: Status: RESOLVED | Noted: 2019-10-30 | Resolved: 2022-07-25

## 2022-07-25 LAB
FOLATE SERPL-MCNC: >40 NG/ML
VIT B12 SERPL-MCNC: 763 PG/ML (ref 211–911)

## 2022-07-25 PROCEDURE — 99396 PREV VISIT EST AGE 40-64: CPT | Performed by: INTERNAL MEDICINE

## 2022-07-25 PROCEDURE — 82607 VITAMIN B-12: CPT

## 2022-07-25 PROCEDURE — 36415 COLL VENOUS BLD VENIPUNCTURE: CPT

## 2022-07-25 PROCEDURE — 82746 ASSAY OF FOLIC ACID SERUM: CPT

## 2022-07-25 RX ORDER — HYDROCORTISONE ACETATE 25 MG/1
SUPPOSITORY RECTAL
Qty: 30 SUPPOSITORY | Refills: 0 | Status: SHIPPED | OUTPATIENT
Start: 2022-07-25

## 2022-07-25 RX ORDER — HYDROCORTISONE ACETATE 25 MG/1
SUPPOSITORY RECTAL
COMMUNITY
Start: 2022-07-14 | End: 2022-07-25 | Stop reason: SDUPTHER

## 2022-07-25 RX ORDER — VENLAFAXINE HYDROCHLORIDE 150 MG/1
CAPSULE, EXTENDED RELEASE ORAL
Qty: 90 CAPSULE | Refills: 0 | Status: SHIPPED | OUTPATIENT
Start: 2022-07-25 | End: 2023-05-22

## 2022-07-25 RX ORDER — VENLAFAXINE HYDROCHLORIDE 150 MG/1
CAPSULE, EXTENDED RELEASE ORAL
COMMUNITY
Start: 2022-06-06 | End: 2022-07-25 | Stop reason: SDUPTHER

## 2022-07-25 NOTE — PROGRESS NOTES
CHIEF COMPLAINT  Chief Complaint   Patient presents with   • Annual Exam     HPI  Tiarra Chavez is a 42 y.o. female who presents today for the following     Recommendations:  Regular exercise at least 4 days a week  Diet: advised balanced diet    Immunizations:  TdaP:  Due  PNA: Due     GYN  Previous PAP:   UTD  Abnormal PAP: no  Last Mammography: UTD     Pernicious anemia, B12 deficiency, macrocytosis, chronic fatigue  Dg: in 2015  C/o chronic fatigue: negative TSH, CBC, Vitamin D   Denies:  - Anorexia, sweating, pallor  - Extremity numbness or tingling  Rx: B12 injections Q 7 days                      FH: neg  Reviewed labs.      Right breast invasive ductal adenocarcinoma, ER/OH positive  Interval course:  - started tamoxifen in 1/2021, oncology f/u     Background  41 y/o, F  Primary dg: Breast cancer stage 1B  · TNM pT3, PN0, cM0, [place] ID G2  · Er/Pr (+)  · HER 2 protein overexpression: equivocal; HER2 Gene amplification: (-)  · BRCA (-)     Stage 1B right breast invasive ductal carcinoma  3/26/2020:   - S/p bilateral mastectomy and lymph node dissection on the right side              - showed 7 multiply 4.5 mL replied 3 cm, grade 2, margins uninvolved              - distance from closest margin 3 mm from the superficial throat/anterior margin  -7 lymph nodes benign; lymphovascular invasion present  -ER 90%, OH 90%, Ki 67 10-15%.     PET scan, 2/24/2020:  Patchy uptake in the sternum, most in the manubrio-sternal junction/sternal angle. No change is seen on correlate CT. This is indeterminant and follow-up is recommended.     CT chest, abdomen, pelvis, 2/12/20   1. Nonspecific 1.1 cm low-attenuation mass in the left hepatic lobe could be cysts or hemangioma. Metastatic disease cannot be entirely excluded. Further evaluation with MRI recommended.  2. No suspicious pulmonary nodule.     MRI abdomen, 3/13/20:  1.1 cm lesion in the LT lobe consistent with hemangioma     Treatment:  - St post radiotherapy.  -  Ongoing chemotherapy, 4 cycles every 3 weeks.  - Tamoxifen  - F/u by oncology Dr Hahn.     Dg:  bx, 1/6//2020 -pathology  A. Right breast:          Invasive ductal carcinoma, grade 2.          Tumor measures 7.0 x 4.5 x 3.0 cm.          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          All surgical margins of resection, uninvolved by in situ and           invasive carcinoma.          Skin and nipple without histopathologic abnormality, uninvolved           by tumor.     B. Right axillary lymph node:          Four lymph nodes, negative for metastatic carcinoma (0/4).   C. Right axillary tissue:          Three lymph nodes, negative for metastatic carcinoma (0/3).   D. Left breast:          Fibroglandular breast tissue demonstrating a hyalinized           fibroadenoma (1.5 cm) with biopsy site changes, focus of           atypical lobular hyperplasia (ALH) and fibrocystic change           including apocrine metaplasia and adenosis.          Skin and nipple without histopathologic abnormality.          No in situ or invasive carcinoma identified.     Synoptic Report for Invasive Carcinoma of the Breast:          -Procedure: Total mastectomy        -Specimen Laterality: Right        -Tumor Site: Upper inner to upper outer quadrants        -Tumor Size: 7.0 x 4.5 x 3.0 cm        -Histologic Type: Invasive ductal carcinoma        -Histologic Grade (Rodanthe Histologic Score):         Glandular (Acinar)/Tubular Differentiation: Score 3         Nuclear Pleomorphism: Score 2         Mitotic Rate: Score 2         Overall Grade: 2        -Tumor Focality: Single focus        -Ductal Carcinoma In Situ (DCIS): Present, intermediate grade,         solid type with comedo-type necrosis admixed with invasive         component and separate focus in the retroareolar area        -Lobular Carcinoma In Situ (LCIS): No LCIS in  specimen        -Tumor Extension: Not applicable        -Margins:         Invasive Carcinoma Margins: Uninvolved by invasive carcinoma          Distance from closest margin: 3 mm from the superficial/anterior         margin         DCIS Margins: Uninvolved by DCIS          Distance from closest margin: Cannot be determined but is at         least greater than 2 mm from all margins        -Regional Lymph Nodes: All nodes negative         Total number of lymph nodes examined: 7         Number of sentinel lymph node: 4        -Treatment Effect: No known presurgical therapy        -Lymph-Vascular Invasion: Present        -Pathologic Staging:         Primary Tumor: pT3         Regional Lymph Nodes: pN0(sn)         Distant Metastasis: Not applicable        -Additional Pathologic Findings: Fibrocystic change, fibroadenoma         and focal ALH (right breast)        -Ancillary Studies: Performed on previous core biopsy, case         [id number] from 01/06/2020:         Estrogen Receptor (ER): Positive, moderate to strong, 90%         Progesterone Receptor (PgR): Positive, moderate to strong, 90%         Ki-67:   10-15%         HER2:         Immunoperoxidase Studies: Equivocal (2+)         In Situ Hybridization for HER2: Negative        -Microcalcifications: Present, predominantly associated with DCIS    Reviewed PMH, PSH, FH, SH, ALL, HCM/IMM, no changes  Reviewed MEDS, no changes    Patient Active Problem List    Diagnosis Date Noted   • Palpitations    • Condition influencing health status 04/28/2021   • Adjustment disorder with mixed anxiety and depressed mood 10/05/2020   • Malignant neoplasm of lower-inner quadrant of right female breast (HCC) 03/27/2020   • Macrocytosis 04/26/2019   • Pernicious anemia 12/20/2017   • MICHELLE (generalized anxiety disorder) 10/25/2017   • Health care maintenance 10/25/2017     CURRENT MEDICATIONS  Current Outpatient Medications   Medication Sig Dispense Refill   • hydrocortisone (ANUSOL-HC) 25  MG Suppos Anucort-HC 25 mg suppository  INSERT 1 SUPPOSITORY RECTALLY ONCE DAILY AS NEEDED 30 Suppository 0   • venlafaxine (EFFEXOR-XR) 150 MG extended-release capsule venlafaxine  mg capsule,extended release 24 hr  TAKE 1 CAPSULE BY MOUTH ONCE DAILY 90 Capsule 0   • naproxen (ANAPROX) 220 MG tablet Take 220 mg by mouth 2 times a day as needed (For pain).     • ergocalciferol (DRISDOL) 12946 UNIT capsule Take 50,000 Units by mouth see administration instructions. On Sunday and Wed     • SODIUM FLUORIDE 5000 SENSITIVE 1.1-5 % Gel Take 1 Application by mouth at bedtime.     • cyanocobalamin (VITAMIN B-12) 1000 MCG/ML Solution INJECT 1 ML INTRAMUSCULARLY  ONCE A WEEK (Patient taking differently: Inject 1,000 mcg as directed every 7 days. INJECT 1 ML INTRAMUSCULARLY  ONCE A WEEK, on Monday) 12 mL 0   • tamoxifen (NOLVADEX) 20 MG tablet Take 20 mg by mouth every 48 hours.     • Multiple Vitamin (MULTI-VITAMIN DAILY PO) Take 1 Tablet by mouth every day.     • Cyanocobalamin (VITAMIN B 12 PO) Take 1 Tab by mouth every day.     • folic acid (FOLVITE) 1 MG TABS Take 1 mg by mouth every day.       No current facility-administered medications for this visit.     ALLERGIES  Allergies: Shellfish allergy and Nickel  PAST MEDICAL HISTORY  Past Medical History:   Diagnosis Date   • Cancer ([facility]) 2020    breast right   • Allergic rhinitis 4/7/2011   • Headache(784.0)     When in school, not a problem now.  With sleep deprivation.   • Palpitations    • Pernicious anemia    • Snoring    • Urinary incontinence     minor     SURGICAL HISTORY  She  has a past surgical history that includes lumpectomy (2001); vaginal perineal exam repair (5/14/2012); other (Left, 1981); pr mastectomy, simple, complete (Bilateral, 3/26/2020); node biopsy sentinel (Right, 3/26/2020); breast biopsy (Right, 01/06/2020); and mass excision general (Left, 1/10/2022).  SOCIAL HISTORY  Social History     Tobacco Use   • Smoking status: Never Smoker   •  "Smokeless tobacco: Never Used   Vaping Use   • Vaping Use: Never used   Substance Use Topics   • Alcohol use: Never   • Drug use: Yes     Types: Marijuana, Oral     Comment: couple times a week      Social History     Social History Narrative   • Not on file     FAMILY HISTORY  Family History   Problem Relation Age of Onset   • Hypertension Mother    • Thyroid Mother         Hypothyroid   • Hyperlipidemia Father    • Cancer Father         T cell lymphoma   • Heart Disease Maternal Grandfather         MI 40 yo, unknown RF.   • Cancer Paternal Uncle         lymphoma     Family Status   Relation Name Status   • Mo Hyperthyroidism Alive        57yo   • Fa     • MGFa  (Not Specified)   • PUnc  (Not Specified)       ROS   Constitutional: Negative for fever, chills, fatigue.  HENT: Negative for congestion, sore throat.  Eyes: Negative for vision problems.   Respiratory: Negative for cough, shortness of breath.  Cardiovascular: Negative for chest pain, palpitations.   Gastrointestinal: Negative for heartburn, nausea, abdominal pain.   Genitourinary: Negative for dysuria.  Musculoskeletal: Negative for significant myalgia, back and joint pain.   Skin: Negative for rash.   Neuro: Negative for dizziness, weakness and headaches.   Endo/Heme/Allergies: Does not bruise/bleed easily.   Psychiatric/Behavioral: Treated for depression.    PHYSICAL EXAM   Blood Pressure 106/72 (BP Location: Left arm, Patient Position: Sitting, BP Cuff Size: Adult)   Pulse 70   Temperature 36.2 °C (97.1 °F) (Temporal)   Height 1.689 m (5' 6.5\")   Weight 72.6 kg (160 lb)   Oxygen Saturation 99%  Body mass index is 25.44 kg/m².  General:  NAD, well appearing  HEENT:   NC/AT, PERRLA, EOMI.  Cardiovascular: unlabored breathing, no peripheral cyanosis or swelling.  Lungs:   no respiratory distress.  Abdomen: non- distended.  Extremities:  No LE swelling.  Skin:  Warm, dry.  No erythema. No rash.   Neurologic: Alert & oriented x 3. CN II-XII " grossly intact. No focal deficits.  Psychiatric:  Affect normal, mood normal, judgment normal.    Labs     Labs are reviewed and discussed with a patient  Lab Results   Component Value Date/Time    CHOLSTRLTOT 166 07/03/2018 07:53 AM    CHOLSTRLTOT 145 03/16/2010 08:20 AM    LDL 87 07/03/2018 07:53 AM    LDL 71 03/16/2010 08:20 AM    HDL 59 07/03/2018 07:53 AM    HDL 59 03/16/2010 08:20 AM    TRIGLYCERIDE 98 07/03/2018 07:53 AM    TRIGLYCERIDE 76 03/16/2010 08:20 AM       Lab Results   Component Value Date/Time    SODIUM 139 07/03/2018 07:53 AM    SODIUM 131 (L) 05/20/2012 03:00 AM    POTASSIUM 4.0 07/03/2018 07:53 AM    POTASSIUM 3.7 05/20/2012 03:00 AM    CHLORIDE 101 07/03/2018 07:53 AM    CHLORIDE 100 05/20/2012 03:00 AM    CO2 23 07/03/2018 07:53 AM    CO2 21 05/20/2012 03:00 AM    GLUCOSE 87 07/03/2018 07:53 AM    GLUCOSE 109 (H) 05/20/2012 03:00 AM    BUN 14 07/03/2018 07:53 AM    BUN 7 (L) 05/20/2012 03:00 AM    CREATININE 0.77 07/03/2018 07:53 AM    CREATININE 0.67 05/20/2012 03:00 AM    CREATININE 0.80 03/16/2010 08:20 AM    BUNCREATRAT 18 07/03/2018 07:53 AM    BUNCREATRAT 18 03/16/2010 08:20 AM    GLOMRATE >59 03/16/2010 08:20 AM     Lab Results   Component Value Date/Time    ALKPHOSPHAT 64 07/03/2018 07:53 AM    ALKPHOSPHAT 86 05/20/2012 03:00 AM    ASTSGOT 16 07/03/2018 07:53 AM    ASTSGOT 27 05/20/2012 03:00 AM    ALTSGPT 10 07/03/2018 07:53 AM    ALTSGPT 39 05/20/2012 03:00 AM    TBILIRUBIN 0.7 07/03/2018 07:53 AM    TBILIRUBIN 0.9 05/20/2012 03:00 AM      Lab Results   Component Value Date/Time    HBA1C 5.0 09/25/2019 01:24 PM     Lab Results   Component Value Date/Time    TSH 3.150 09/25/2019 01:24 PM    TSH 1.350 07/03/2018 07:53 AM     No results found for: FREET4    Lab Results   Component Value Date/Time    WBC 4.4 11/18/2020 04:12 AM    WBC 9.0 03/27/2020 04:17 AM    WBC 4.4 03/16/2010 08:20 AM    RBC 4.17 11/18/2020 04:12 AM    RBC 2.50 (L) 03/27/2020 04:17 AM    RBC 4.26 03/16/2010 08:20 AM     HEMOGLOBIN 13.9 11/18/2020 04:12 AM    HEMOGLOBIN 8.8 (L) 03/27/2020 04:17 AM    HEMATOCRIT 42.5 11/18/2020 04:12 AM    HEMATOCRIT 26.9 (L) 03/27/2020 04:17 AM     (H) 11/18/2020 04:12 AM    .6 (H) 03/27/2020 04:17 AM     (H) 03/16/2010 08:20 AM    MCH 33.3 (H) 11/18/2020 04:12 AM    MCH 35.2 (H) 03/27/2020 04:17 AM    MCH 35.0 (H) 03/16/2010 08:20 AM    MCHC 32.7 11/18/2020 04:12 AM    MCHC 32.7 (L) 03/27/2020 04:17 AM    MPV 9.3 03/27/2020 04:17 AM    NEUTSPOLYS 68 11/18/2020 04:12 AM    NEUTSPOLYS 69.60 06/15/2016 05:23 PM    LYMPHOCYTES 25 11/18/2020 04:12 AM    LYMPHOCYTES 20.40 (L) 06/15/2016 05:23 PM    MONOCYTES 5 11/18/2020 04:12 AM    MONOCYTES 7.70 06/15/2016 05:23 PM    EOSINOPHILS 2 11/18/2020 04:12 AM    EOSINOPHILS 1.20 06/15/2016 05:23 PM    BASOPHILS 0 11/18/2020 04:12 AM    BASOPHILS 0.60 06/15/2016 05:23 PM      Imaging     Reviewed per HPI    Assessment and Plan     Tiarra Chavez is a 42 y.o. female    1. Annual physical exam  Reviewed PMH, PSH, FH, SH, ALL, MEDS, HCM/IMM.   Advised healthy habits, diet, exercise.    2. Health care maintenance  Per HPI    3. Pernicious anemia  Pending labs, continue current treatment  - VIT B12,  FOLIC ACID  4. Macrocytosis  - VIT B12,  FOLIC ACID  - FOLATE; Future    5. Chronic fatigue  Reviewed and discussed labs, wnl    6. Malignant neoplasm of lower-inner quadrant of right breast of female, estrogen receptor positive (HCC)   -No recurrence, continue current treatment/tamoxifen and oncology follow-up  7. High risk medication use  - Comp Metabolic Panel; Future    Counseling:   - Smoking:  Nonsmoker    Followup: Return in about 6 months (around 1/25/2023) for LABS.    All questions are answered.    Please note that this dictation was created using voice recognition software, and that there might be errors of lio and possibly content.

## 2022-08-04 LAB
ALBUMIN SERPL-MCNC: 4.5 G/DL (ref 3.8–4.8)
ALBUMIN/GLOB SERPL: 1.6 {RATIO} (ref 1.2–2.2)
ALP SERPL-CCNC: 49 IU/L (ref 44–121)
ALT SERPL-CCNC: 12 IU/L (ref 0–32)
AST SERPL-CCNC: 19 IU/L (ref 0–40)
BILIRUB SERPL-MCNC: 0.4 MG/DL (ref 0–1.2)
BUN SERPL-MCNC: 10 MG/DL (ref 6–24)
BUN/CREAT SERPL: 12 (ref 9–23)
CALCIUM SERPL-MCNC: 9.1 MG/DL (ref 8.7–10.2)
CHLORIDE SERPL-SCNC: 105 MMOL/L (ref 96–106)
CO2 SERPL-SCNC: 24 MMOL/L (ref 20–29)
CREAT SERPL-MCNC: 0.84 MG/DL (ref 0.57–1)
EGFRCR SERPLBLD CKD-EPI 2021: 88 ML/MIN/1.73
GLOBULIN SER CALC-MCNC: 2.8 G/DL (ref 1.5–4.5)
GLUCOSE SERPL-MCNC: 104 MG/DL (ref 65–99)
POTASSIUM SERPL-SCNC: 4 MMOL/L (ref 3.5–5.2)
PROT SERPL-MCNC: 7.3 G/DL (ref 6–8.5)
SODIUM SERPL-SCNC: 141 MMOL/L (ref 134–144)

## 2022-08-10 ENCOUNTER — APPOINTMENT (OUTPATIENT)
Dept: BEHAVIORAL HEALTH | Facility: CLINIC | Age: 42
End: 2022-08-10
Payer: COMMERCIAL

## 2022-09-12 ENCOUNTER — TELEMEDICINE (OUTPATIENT)
Dept: BEHAVIORAL HEALTH | Facility: CLINIC | Age: 42
End: 2022-09-12
Payer: COMMERCIAL

## 2022-09-12 DIAGNOSIS — F41.1 GAD (GENERALIZED ANXIETY DISORDER): ICD-10-CM

## 2022-09-12 PROCEDURE — 90837 PSYTX W PT 60 MINUTES: CPT | Mod: GT | Performed by: SOCIAL WORKER
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
## 2022-10-18 ENCOUNTER — TELEMEDICINE (OUTPATIENT)
Dept: BEHAVIORAL HEALTH | Facility: CLINIC | Age: 42
End: 2022-10-18
Payer: COMMERCIAL

## 2022-10-18 DIAGNOSIS — F41.1 GAD (GENERALIZED ANXIETY DISORDER): ICD-10-CM

## 2022-10-18 PROCEDURE — 90837 PSYTX W PT 60 MINUTES: CPT | Mod: GT | Performed by: SOCIAL WORKER

## 2022-10-18 NOTE — PROGRESS NOTES
[facility]   Therapy Progress Note    This visit was conducted via Zoom using secure and encrypted videoconferencing technology.  The patient was in her home in the [facility].  The patient's identity was confirmed and verbal consent was obtained for this virtual visit.    Name: Tiarra Chavez  MRN: [id number]  : 1980  Age: 42 y.o.  Date of assessment: 10/18/2022  PCP: Beatris Pathak M.D..  Persons in attendance: Patient  Total session time: 55 minutes    Topics addressed in psychotherapy include: Pt to [facility] video appt. Pt reports medications allow for decrease in extremes in mood, however pt can be non compliant (as been off med 2 weeks). Processed work related stressors, parenting/coparenting,  job opportunities. Plan: See monthly.    Objective Observations:   Participation:Active verbal participation, Engaged, and Open to feedback   Grooming:Casual   Cognition:Fully Oriented   Eye Contact:Good   Mood:Euthymic   Affect:Flexible   Thought Process:Logical   Speech:Rate within normal limits and Volume within normal limits    Current Risk:   Suicide: NA   Homicide: NA   Self-Harm: NA   Relapse: NA     Safety Plan Reviewed: no    Care Plan Updated: No    Does patient express agreement with the above plan? Yes     Diagnosis:  MICHELLE    Therapeutic Intervention(s): Distress tolerance skills, Goal-setting, and Interpersonal effectiveness skills    Treatment Goal(s)/Objective(s) addressed:      Progress toward Treatment Goals: Moderate improvement    Referral appointment(s) scheduled? No       Iwona Acuna L.C.S.W.

## 2022-12-07 ENCOUNTER — APPOINTMENT (OUTPATIENT)
Dept: BEHAVIORAL HEALTH | Facility: CLINIC | Age: 42
End: 2022-12-07
Payer: COMMERCIAL

## 2023-01-17 ENCOUNTER — TELEMEDICINE (OUTPATIENT)
Dept: BEHAVIORAL HEALTH | Facility: CLINIC | Age: 43
End: 2023-01-17
Payer: COMMERCIAL

## 2023-01-17 DIAGNOSIS — F41.1 GAD (GENERALIZED ANXIETY DISORDER): ICD-10-CM

## 2023-01-17 PROCEDURE — 90837 PSYTX W PT 60 MINUTES: CPT | Mod: GT | Performed by: SOCIAL WORKER

## 2023-01-17 NOTE — PROGRESS NOTES
"[facility]   Therapy Progress Note    This visit was conducted via Zoom using secure and encrypted videoconferencing technology.  The patient was in her home in the [facility].  The patient's identity was confirmed and verbal consent was obtained for this virtual visit.    Name: Tiarra Chavez  MRN: [id number]  : 1980  Age: 42 y.o.  Date of assessment: 2023  PCP: Beatris Pathak M.D..  Persons in attendance: Patient  Total session time: 56 minutes    Topics addressed in psychotherapy include: Pt to [facility] video appt. Some anxiety, overthinking and scattered thoughts. Pt has 1A aggressive cancer, monitoring 3-4 months, previously on B12 and folic acid, \"feeling exhausted dealing with stuff.\" Problem solving new work complications with raise,seeing someone, explored hx in past relationships.  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  Plan: see monthly.     Objective Observations:   Participation:Active verbal participation and Engaged   Grooming:Casual   Cognition:Fully Oriented   Eye Contact:Good   Mood:Euthymic   Affect:Congruent with content   Thought Process:Goal-directed   Speech:Rate within normal limits and Volume within normal limits    Current Risk:   Suicide: NA   Homicide: NA   Self-Harm: NA   Relapse: NA   Safety Plan Reviewed: NA    Care Plan Updated: No    Does patient express agreement with the above plan? Yes     Diagnosis:  No diagnosis found.    Therapeutic Intervention(s): Interpersonal effectiveness skills, Stressors assessed, and Supportive psychotherapy    Treatment Goal(s)/Objective(s) addressed: anxiety/job     Progress toward Treatment Goals: Moderate improvement    Referral appointment(s) scheduled? No       Iwona Acuna L.C.S.W.    "

## 2023-02-14 ENCOUNTER — TELEMEDICINE (OUTPATIENT)
Dept: BEHAVIORAL HEALTH | Facility: CLINIC | Age: 43
End: 2023-02-14
Payer: COMMERCIAL

## 2023-02-14 DIAGNOSIS — F41.1 GAD (GENERALIZED ANXIETY DISORDER): ICD-10-CM

## 2023-02-14 PROCEDURE — 90837 PSYTX W PT 60 MINUTES: CPT | Mod: GT | Performed by: SOCIAL WORKER

## 2023-02-14 ASSESSMENT — PATIENT HEALTH QUESTIONNAIRE - PHQ9
5. POOR APPETITE OR OVEREATING: 0 - NOT AT ALL
CLINICAL INTERPRETATION OF PHQ2 SCORE: 2
SUM OF ALL RESPONSES TO PHQ QUESTIONS 1-9: 4

## 2023-02-14 NOTE — PROGRESS NOTES
[facility]   Therapy Progress Note    This visit was conducted via Zoom using secure and encrypted videoconferencing technology.  The patient was in her home in the state of [place].  The patient's identity was confirmed and verbal consent was obtained for this virtual visit.    Name: Tiarra Chavez  MRN: [id number]  : 1980  Age: 42 y.o.  Date of assessment: 2023  PCP: Beatris Pathak M.D..  Persons in attendance: Patient  Total session time: 55 minutes    Topics addressed in psychotherapy include: Pt to video appt.  Processed friend death/loss.  Opened up 2nd EOC which brings up stress/trauma from last case. Pt is taking opportunity to be more consistent with sleep schedule and sleep hygiene, not as consistent with strength training and yoga. Returned from 1 wk [place]. Plan: see monthly.     Objective Observations:   Participation:Active verbal participation and Engaged   Grooming:Casual   Cognition:Fully Oriented   Eye Contact:Good   Mood:Euthymic   Affect:Congruent with content   Thought Process:Goal-directed   Speech:Rate within normal limits and Volume within normal limits    Current Risk:   Suicide: low   Homicide: NA   Self-Harm: NA   Relapse: low   Safety Plan Reviewed: no    Care Plan Updated: No    Does patient express agreement with the above plan? Yes     Diagnosis:  No diagnosis found.    Therapeutic Intervention(s): Interpersonal effectiveness skills    Treatment Goal(s)/Objective(s) addressed: MICHELLE     Progress toward Treatment Goals: Moderate improvement    Referral appointment(s) scheduled? No       Iwona Acuna L.C.S.W.

## 2023-02-27 NOTE — PROGRESS NOTES
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  Pretreatment symptoms included infrequent resuscitative snorts, shortness of breath, nonrestorative sleep, nocturnal awakenings, tiredness, and napping.     Her home sleep apnea test was performed on 1/6/2020 June showed mild to moderate obstructive sleep apnea with an GINA of 13.2, corey saturation of 82%, and saturations less than or equal to 88% for 2% of the monitoring time.    She messaged in October that she had not achieved the improvement that she anticipated and wondered if a higher CPAP pressure was in order.        Significant comorbidities modifying factors include palpitations, mastectomy for breast cancer status post chemo,  excision of a chest wall mass (path showed fibroadipose tissue, no malignancy),never smoker, macrocytosis, up-to-date with 2021 influenza vaccination, up-to-date with SARS-CoV-2 vaccination, B12 deficiency, and vitamin D deficiency.               Patient Active Problem List    Diagnosis Date Noted    Palpitations     Condition influencing health status 04/28/2021    Adjustment disorder with mixed anxiety and depressed mood 10/05/2020    Malignant neoplasm of lower-inner quadrant of right female breast (HCC) 03/27/2020    Macrocytosis 04/26/2019    Pernicious anemia 12/20/2017    MICHELLE (generalized anxiety disorder) 10/25/2017    Health care maintenance 10/25/2017       Past Medical History:   Diagnosis Date    Allergic rhinitis 4/7/2011    Cancer (HCC) 2020    breast right    Headache(784.0)     When in school, not a problem now.  With sleep deprivation.    Palpitations     Pernicious anemia     Snoring     Urinary incontinence     minor        Past Surgical History:   Procedure Laterality Date    MASS EXCISION GENERAL Left 1/10/2022    Procedure: EXCISION, MASS - CHEST WALL;   "Surgeon: Sophy Holbrook M.D.;  Location: SURGERY [facility];  Service: General    PB MASTECTOMY, SIMPLE, COMPLETE Bilateral 3/26/2020    Procedure: MASTECTOMY;  Surgeon: Sophy Holbrook M.D.;  Location: SURGERY [facility];  Service: General    NODE BIOPSY SENTINEL Right 3/26/2020    Procedure: BIOPSY, LYMPH NODE, SENTINEL;  Surgeon: Sophy Holbrook M.D.;  Location: SURGERY [facility];  Service: General    BREAST BIOPSY Right 01/06/2020    Rt Breast Bx    VAGINAL PERINEAL EXAM REPAIR  5/14/2012    Performed by HUSSAIN RANGEL at LABOR AND DELIVERY    LUMPECTOMY  2001    \"[facility]    OTHER Left 1981    laceration repair  left leg       Family History   Problem Relation Age of Onset    Hypertension Mother     Thyroid Mother         Hypothyroid    Hyperlipidemia Father     Cancer Father         T cell lymphoma    Heart Disease Maternal Grandfather         MI 38 yo, unknown RF.    Cancer Paternal Uncle         lymphoma       Social History     Socioeconomic History    Marital status: Single     Spouse name: Not on file    Number of children: Not on file    Years of education: Not on file    Highest education level: Not on file   Occupational History    Not on file   Tobacco Use    Smoking status: Never    Smokeless tobacco: Never   Vaping Use    Vaping Use: Never used   Substance and Sexual Activity    Alcohol use: Never    Drug use: Yes     Types: Marijuana, Oral     Comment: couple times a week     Sexual activity: Not Currently     Partners: Male     Birth control/protection: Condom, Pill   Other Topics Concern    Not on file   Social History Narrative    Not on file     Social Determinants of Health     Financial Resource Strain: Not on file   Food Insecurity: Not on file   Transportation Needs: Not on file   Physical Activity: Not on file   Stress: Not on file   Social Connections: Not on file   Intimate Partner Violence: Not on file   Housing Stability: Not on file       Current Outpatient " "Medications   Medication Sig Dispense Refill    hydrocortisone (ANUSOL-HC) 25 MG Suppos Anucort-HC 25 mg suppository  INSERT 1 SUPPOSITORY RECTALLY ONCE DAILY AS NEEDED 30 Suppository 0    venlafaxine (EFFEXOR-XR) 150 MG extended-release capsule venlafaxine  mg capsule,extended release 24 hr  TAKE 1 CAPSULE BY MOUTH ONCE DAILY 90 Capsule 0    naproxen (ANAPROX) 220 MG tablet Take 220 mg by mouth 2 times a day as needed (For pain).      ergocalciferol (DRISDOL) 82349 UNIT capsule Take 50,000 Units by mouth see administration instructions. On Sunday and Wed      SODIUM FLUORIDE 5000 SENSITIVE 1.1-5 % Gel Take 1 Application by mouth at bedtime.      cyanocobalamin (VITAMIN B-12) 1000 MCG/ML Solution INJECT 1 ML INTRAMUSCULARLY  ONCE A WEEK (Patient taking differently: Inject 1,000 mcg as directed every 7 days. INJECT 1 ML INTRAMUSCULARLY  ONCE A WEEK, on Monday) 12 mL 0    tamoxifen (NOLVADEX) 20 MG tablet Take 20 mg by mouth every 48 hours.      Multiple Vitamin (MULTI-VITAMIN DAILY PO) Take 1 Tablet by mouth every day.      Cyanocobalamin (VITAMIN B 12 PO) Take 1 Tab by mouth every day.      folic acid (FOLVITE) 1 MG TABS Take 1 mg by mouth every day.       No current facility-administered medications for this visit.    \"CURRENT RX\"    ALLERGIES: Shellfish allergy and Nickel    ROS  ***  Constitutional: Denies fever, chills, sweats,  weight loss, fatigue  Cardiovascular: Denies chest pain, tightness, palpitations, swelling in legs/feet, fainting, difficulty breathing when lying down but gets better when sitting up.   Respiratory: Denies shortness of breath, cough, sputum, wheezing, painful breathing, coughing up blood.   Sleep: per HPI  Gastrointestinal: Denies  difficulty swallowing, nausea, abdominal pain, diarrhea, constipation, heartburn.  Musculoskeletal: Denies painful joints, sore muscles, back pain.        PHYSICAL EXAM  ***    There were no vitals taken for this visit.  Appearance: Well-nourished, " well-developed, no acute distress  Eyes:  PERRLA, EOMI  ENMT: masked  Neck: Supple, trachea midline, no masses  Respiratory effort:  No intercostal retractions or use of accessory muscles  Lung auscultation:  No wheezes rhonchi rubs or rales  Cardiac: No murmurs, rubs, or gallops; regular rhythm, normal rate; no edema  Abdomen:  No tenderness, no organomegaly.  Musculoskeletal:  Grossly normal; gait and station normal; digits and nails normal  Skin:  No rashes, petechiae, cyanosis  Neurologic: without focal signs; oriented to person, time, place, and purpose; judgement intact  Psychiatric:  No depression, anxiety, agitation      Medical Decision Making       The medical record was reviewed in its entirety including the referral notes, records from primary care, consultants notes, hospital records, lab, imaging, microbiology, immunology, and immunizations.  Care gaps identified and reviewed with the patient.    Diagnostic and titration nocturnal polysomnogram's, home sleep apnea tests, continuous nocturnal oximetry results, multiple sleep latency tests, and compliance reports reviewed.  There are no diagnoses linked to this encounter.    PLAN:   Has been advised to continue the current ***, clean equipment frequently, and get new mask and supplies as allowed by insurance and DME. Advised about potential problems including nasal obstruction/stuffiness, mask leak or discomfort , frequent awakenings, chill or dryness of the upper airway, noise, inconvenience, and lack of benefit. Recommend an earlier appointment, if significant treatment barriers develop.    The risks of untreated sleep apnea were discussed with the patient at length. Patients with CASSIE are at increased risk of cardiovascular disease including systemic arterial hypertension, pulmonary arterial hypertension (transient or fixed), TIA, and an elevated risk of stroke, heart attack, sudden death, or arrhythmia between the hours of 11 PM and 6 AM. CASSIE patients  have an increased risk of motor vehicle accidents, type 2 diabetes, GERD, repetitive mechanical trauma to pharyngeal muscles with inflammation and denervation, frequent EEG arousals leading to nonrestorative sleep, excessive daytime sleepiness, fatigue, depression, poor pain control, irritability, and lower quality of life.  The patient was advised to avoid driving a motor vehicle when drowsy.    Positive airway pressure will favorably impact many of the adverse conditions and effects provoked by CASSIE.    Have advised the patient to follow up with the appropriate healthcare practitioners for all other medical problems and issues.    No follow-ups on file.    Total time 30 minutes

## 2023-02-28 ENCOUNTER — TELEMEDICINE (OUTPATIENT)
Dept: SLEEP MEDICINE | Facility: MEDICAL CENTER | Age: 43
End: 2023-02-28
Attending: INTERNAL MEDICINE
Payer: COMMERCIAL

## 2023-02-28 ENCOUNTER — PATIENT MESSAGE (OUTPATIENT)
Dept: SLEEP MEDICINE | Facility: MEDICAL CENTER | Age: 43
End: 2023-02-28

## 2023-02-28 VITALS — WEIGHT: 158 LBS | HEIGHT: 67 IN | BODY MASS INDEX: 24.8 KG/M2

## 2023-02-28 DIAGNOSIS — G47.33 OSA (OBSTRUCTIVE SLEEP APNEA): ICD-10-CM

## 2023-02-28 DIAGNOSIS — G47.10 HYPERSOMNOLENCE: ICD-10-CM

## 2023-02-28 PROCEDURE — 99213 OFFICE O/P EST LOW 20 MIN: CPT | Mod: 95 | Performed by: INTERNAL MEDICINE

## 2023-02-28 PROCEDURE — 99213 OFFICE O/P EST LOW 20 MIN: CPT | Performed by: INTERNAL MEDICINE

## 2023-02-28 RX ORDER — MODAFINIL 100 MG/1
100 TABLET ORAL DAILY
Qty: 30 TABLET | Refills: 1 | Status: SHIPPED | OUTPATIENT
Start: 2023-02-28 | End: 2023-03-30

## 2023-02-28 NOTE — PROGRESS NOTES
Telemedicine Visit: Established Patient     This encounter was conducted via Zoom using secure and encrypted video conferencing technology.  The patient was in a private location  (POS 02) in the state of [place]  The patient's identity was confirmed and verbal consent was obtained for this virtual visit.         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  Pretreatment symptoms included infrequent resuscitative snorts, shortness of breath, nonrestorative sleep, nocturnal awakenings, tiredness, and napping.     Her home sleep apnea test was performed on 1/6/2020 June showed mild to moderate obstructive sleep apnea with an GINA of 13.2, corey saturation of 82%, and saturations less than or equal to 88% for 2% of the monitoring time.     She messaged in October that she had not achieved the improvement that she anticipated and wondered if a higher CPAP pressure was in order.  Discussed at length.  She remains more fatigued and sleepy with reduced energy levels.  We discussed changing the pressure but this would not be beneficial as she her sleep apnea is well controlled on APAP have offered modafinil 100 mg daily and she agrees to give this a try.  We will give her 1 refill and she can send a Sarta message to us in a month or 2.  If that does work for her we can give her another prescription with 5 refills.    Today, 2/20/2023, her 30-day compliance is 100% for 7 hours and 26 minutes.  On APAP 5-15 cm water her average residual estimated apnea-hypopnea index is a normal 0.8.  Her median leak is 0.4 L/min with a 95th percentile of 11.7 L/min.        Significant comorbidities modifying factors include palpitations, mastectomy for breast cancer status post chemo,  excision of a chest wall mass (path showed fibroadipose tissue, no  malignancy),never smoker, macrocytosis, up-to-date with age-appropriate vaccinations, B12 deficiency, and vitamin D deficiency.  ROS   Denies any recent fevers or chills. No nausea or vomiting. No chest pains or shortness of breath.     Allergies   Allergen Reactions    Shellfish Allergy Hives    Nickel Itching     rash    Trazodone        Current medicines (including changes today)  Current Outpatient Medications   Medication Sig Dispense Refill    hydrocortisone (ANUSOL-HC) 25 MG Suppos Anucort-HC 25 mg suppository  INSERT 1 SUPPOSITORY RECTALLY ONCE DAILY AS NEEDED 30 Suppository 0    venlafaxine (EFFEXOR-XR) 150 MG extended-release capsule venlafaxine  mg capsule,extended release 24 hr  TAKE 1 CAPSULE BY MOUTH ONCE DAILY (Patient taking differently: 75 mg. venlafaxine  mg capsule,extended release 24 hr  TAKE 1 CAPSULE BY MOUTH ONCE DAILY) 90 Capsule 0    naproxen (ANAPROX) 220 MG tablet Take 220 mg by mouth 2 times a day as needed (For pain).      ergocalciferol (DRISDOL) 10113 UNIT capsule Take 50,000 Units by mouth see administration instructions. On Sunday and Wed      SODIUM FLUORIDE 5000 SENSITIVE 1.1-5 % Gel Take 1 Application by mouth at bedtime.      cyanocobalamin (VITAMIN B-12) 1000 MCG/ML Solution INJECT 1 ML INTRAMUSCULARLY  ONCE A WEEK (Patient taking differently: Inject 1,000 mcg as directed every 7 days. INJECT 1 ML INTRAMUSCULARLY  ONCE A WEEK, on Monday) 12 mL 0    tamoxifen (NOLVADEX) 20 MG tablet Take 20 mg by mouth every 48 hours.      Multiple Vitamin (MULTI-VITAMIN DAILY PO) Take 1 Tablet by mouth every day.      Cyanocobalamin (VITAMIN B 12 PO) Take 1 Tab by mouth every day.      folic acid (FOLVITE) 1 MG TABS Take 1 mg by mouth every day.       No current facility-administered medications for this visit.       Patient Active Problem List    Diagnosis Date Noted    Palpitations     Condition influencing health status 04/28/2021    Adjustment disorder with mixed anxiety and  depressed mood 10/05/2020    Malignant neoplasm of lower-inner quadrant of right female breast (HCC) 03/27/2020    Macrocytosis 04/26/2019    Pernicious anemia 12/20/2017    MICHELLE (generalized anxiety disorder) 10/25/2017    Health care maintenance 10/25/2017       Family History   Problem Relation Age of Onset    Hypertension Mother     Thyroid Mother         Hypothyroid    Hyperlipidemia Father     Cancer Father         T cell lymphoma    Heart Disease Maternal Grandfather         MI 38 yo, unknown RF.    Cancer Paternal Uncle         lymphoma       She  has a past medical history of Allergic rhinitis (4/7/2011), Cancer (HCC) (2020), Headache(784.0), Palpitations, Pernicious anemia, Snoring, and Urinary incontinence.  She  has a past surgical history that includes lumpectomy (2001); vaginal perineal exam repair (5/14/2012); other (Left, 1981); pr mastectomy, simple, complete (Bilateral, 3/26/2020); node biopsy sentinel (Right, 3/26/2020); breast biopsy (Right, 01/06/2020); and mass excision general (Left, 1/10/2022).       Objective:   Vitals obtained by patient:  Respirations through observation: 16, Height: 5'61/2, and Weight: 158      Physical Exam:  Constitutional: Alert, no distress, well-groomed.  Skin: No rashes in visible areas.  Eye: Round. Conjunctiva clear, lids normal. No icterus.   ENMT: Lips pink without lesions, good dentition, moist mucous membranes. Phonation normal.  Neck: No masses, no thyromegaly. Moves freely without pain.  CV: Pulse as reported by patient  Respiratory: Unlabored respiratory effort, no cough or audible wheeze  Psych: Alert and oriented x3, normal affect and mood.       Medical Decision Making       The medical record was reviewed in its entirety including the referral notes, records from primary care, consultants notes, hospital records, labs, imaging, microbiology, immunology, and immunizations.  Care gaps identified and reviewed with the patient.    Diagnostic and titration  nocturnal polysomnogram's, home sleep apnea tests, continuous nocturnal oximetry results, multiple sleep latency tests, and compliance reports reviewed.    1. CASSIE (obstructive sleep apnea)  - DME Mask and Supplies      Has been advised to continue the current PAP, clean equipment frequently, and get new mask and supplies as allowed by insurance and DME. Advised about potential problems including nasal obstruction/stuffiness, mask leak or discomfort , frequent awakenings, chill or dryness of the upper airway, noise, inconvenience, and lack of benefit. Recommend an earlier appointment, if significant treatment barriers develop.        She messaged in October that she had not achieved the improvement that she anticipated and wondered if a higher CPAP pressure was in order.  Discussed at length.  She remains more fatigued and sleepy with reduced energy levels.  We discussed changing the pressure but this would not be beneficial as she her sleep apnea is well controlled on APAP have offered modafinil 100 mg daily and she agrees to give this a try.  We will give her 1 refill and she can send a Siteminis message to us in a month or 2.  If that does work for her we can give her another prescription with 5 refills.      The risks of untreated sleep apnea were discussed with the patient at length. Patients with CASSIE are at increased risk of cardiovascular disease including coronary artery disease, systemic arterial hypertension, pulmonary arterial hypertension, cardiac arrythmias, and stroke. CASSIE patients have an increased risk of motor vehicle accidents, type 2 diabetes, chronic kidney disease, and non-alcoholic liver disease. The patient was advised to avoid driving a motor vehicle when drowsy.    Positive airway pressure will favorably impact many of the adverse conditions and effects provoked by CASSIE.     Have advised the patient to follow up with the appropriate healthcare practitioners for all other medical problems and  issues.    Mask wearing, handwashing, and social distancing protocols reviewed and encouraged.    Time spent 25 minutes. More than 1/2 the time spent coordinating care, counseling the patient, and reviewing the pathophysiology and treatment options for sleep apnea and the patient specific modifying factors and significant co-morbidities.      Follow-up:  Return in about 1 year (around 2/28/2024).

## 2023-03-10 ENCOUNTER — TELEPHONE (OUTPATIENT)
Dept: SLEEP MEDICINE | Facility: MEDICAL CENTER | Age: 43
End: 2023-03-10
Payer: COMMERCIAL

## 2023-03-10 NOTE — TELEPHONE ENCOUNTER
MEDICATION PRIOR AUTHORIZATION NEEDED:    1. Name of Medication: Modafinil 100 mg    2. Requested By (Name of Pharmacy): [facility]     3. Is insurance on file current? yes    4. What is the name & phone number of the 3rd party payor? [employer]

## 2023-03-14 ENCOUNTER — TELEPHONE (OUTPATIENT)
Dept: SLEEP MEDICINE | Facility: MEDICAL CENTER | Age: 43
End: 2023-03-14

## 2023-03-23 ENCOUNTER — TELEMEDICINE (OUTPATIENT)
Dept: BEHAVIORAL HEALTH | Facility: CLINIC | Age: 43
End: 2023-03-23
Payer: COMMERCIAL

## 2023-03-23 DIAGNOSIS — F41.1 GAD (GENERALIZED ANXIETY DISORDER): ICD-10-CM

## 2023-03-23 PROCEDURE — 90834 PSYTX W PT 45 MINUTES: CPT | Mod: GT | Performed by: PSYCHIATRY & NEUROLOGY

## 2023-03-23 NOTE — PROGRESS NOTES
"[facility]   Therapy Progress Note    Therapy was provided on this date in coordination with the [facility] approved Clinical Supervisor under the direct supervision of  who was on site during this visit.     Therapist reviewed informed consent, limits of confidentiality and [facility] policies; patient expressed understanding and agreed to voluntarily proceed with evaluation and treatment.    This visit was conducted via Zoom using secure and encrypted videoconferencing technology.  The patient was in a private location in the [facility].  The patient's identity was confirmed and verbal consent was obtained for this virtual visit.  Place of Service: POS 10 -The patient is at Home during their visit in the [facility].    Name: Tiarra Chavez  MRN: [id number]  : 1980  Age: 42 y.o.  Date of assessment: 3/23/2023  PCP: Beatris Pathak M.D.  Persons in attendance: Patient and Bony Phillip  Total session time: 50 minutes    Topics addressed in psychotherapy include:     Data: Client comes as a transfer from [place]  w/breast cancer 3 yrs ago. 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 Client reports she does better w/homework assignments \"talk therapy doesn't work well for me.\" Single mom to 10yr old boy, co-parents w/the father. Previous boyfriend  by suicide. Client reports lots of health and job related stress. 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     Assessment: Client has good insight and judgement. Client responds well to logical and realistic conversation. Client responded well to supportive psychotherapy.     Plan: Therapist provided supportive psychotherapy. Plan of care is to be determined as this session was utilized to build rapport and gather history.     Objective Observations:   Participation:Active verbal participation   Grooming:Casual   Cognition:Alert and Fully Oriented   Eye " Contact:Limited   Mood:Euthymic   Affect:Congruent with content   Thought Process:Logical   Speech:Rate within normal limits and Volume within normal limits    Current Risk:   Suicide:  Not indicated   Homicide:  Not indicated   Self-Harm:  Not indicated   Relapse:  Not indicated   Safety Plan Reviewed: NA        Diagnosis:  1. MICHELLE (generalized anxiety disorder)              Valery Phillip

## 2023-04-25 ENCOUNTER — APPOINTMENT (OUTPATIENT)
Dept: BEHAVIORAL HEALTH | Facility: CLINIC | Age: 43
End: 2023-04-25
Payer: COMMERCIAL

## 2023-05-12 ENCOUNTER — OFFICE VISIT (OUTPATIENT)
Dept: MEDICAL GROUP | Facility: LAB | Age: 43
End: 2023-05-12
Payer: COMMERCIAL

## 2023-05-12 VITALS
WEIGHT: 162 LBS | HEIGHT: 66 IN | TEMPERATURE: 96.8 F | DIASTOLIC BLOOD PRESSURE: 86 MMHG | OXYGEN SATURATION: 96 % | HEART RATE: 75 BPM | RESPIRATION RATE: 16 BRPM | SYSTOLIC BLOOD PRESSURE: 124 MMHG | BODY MASS INDEX: 26.03 KG/M2

## 2023-05-12 DIAGNOSIS — Z17.0 MALIGNANT NEOPLASM OF LOWER-INNER QUADRANT OF RIGHT BREAST OF FEMALE, ESTROGEN RECEPTOR POSITIVE (HCC): ICD-10-CM

## 2023-05-12 DIAGNOSIS — Z76.89 ENCOUNTER TO ESTABLISH CARE WITH NEW DOCTOR: ICD-10-CM

## 2023-05-12 DIAGNOSIS — C50.311 MALIGNANT NEOPLASM OF LOWER-INNER QUADRANT OF RIGHT BREAST OF FEMALE, ESTROGEN RECEPTOR POSITIVE (HCC): ICD-10-CM

## 2023-05-12 DIAGNOSIS — R53.83 OTHER FATIGUE: ICD-10-CM

## 2023-05-12 DIAGNOSIS — G47.30 SLEEP APNEA, UNSPECIFIED TYPE: ICD-10-CM

## 2023-05-12 DIAGNOSIS — D51.0 PERNICIOUS ANEMIA: ICD-10-CM

## 2023-05-12 PROCEDURE — 99214 OFFICE O/P EST MOD 30 MIN: CPT | Performed by: FAMILY MEDICINE

## 2023-05-12 PROCEDURE — 1125F AMNT PAIN NOTED PAIN PRSNT: CPT | Performed by: FAMILY MEDICINE

## 2023-05-12 PROCEDURE — 3074F SYST BP LT 130 MM HG: CPT | Performed by: FAMILY MEDICINE

## 2023-05-12 PROCEDURE — 3079F DIAST BP 80-89 MM HG: CPT | Performed by: FAMILY MEDICINE

## 2023-05-12 RX ORDER — LIDOCAINE 50 MG/G
PATCH TOPICAL
COMMUNITY
Start: 2023-04-24 | End: 2023-05-12

## 2023-05-12 RX ORDER — CYCLOBENZAPRINE HCL 10 MG
10 TABLET ORAL EVERY EVENING
COMMUNITY
Start: 2023-04-07 | End: 2023-05-12

## 2023-05-12 RX ORDER — BACLOFEN 5 MG/1
TABLET ORAL
COMMUNITY
End: 2023-05-12

## 2023-05-12 RX ORDER — LIDOCAINE 50 MG/G
PATCH TOPICAL
COMMUNITY
Start: 2023-05-01

## 2023-05-12 RX ORDER — MODAFINIL 100 MG/1
100 TABLET ORAL DAILY
COMMUNITY
Start: 2023-04-17 | End: 2023-05-12

## 2023-05-12 RX ORDER — ONDANSETRON 4 MG/1
4 TABLET, FILM COATED ORAL 3 TIMES DAILY PRN
COMMUNITY
Start: 2023-04-10

## 2023-05-12 RX ORDER — CYCLOBENZAPRINE HCL 10 MG
TABLET ORAL
COMMUNITY

## 2023-05-12 NOTE — PROGRESS NOTES
CC: Here to establish care, she has multiple medical concerns, but her main concern today is increased fatigability    HPI: Established patient, new to me, accompanied with her girlfriend today  Tiarra presents today to establish care, reviewed the following today:    1. Encounter to establish care with new doctor  42 years old female with past medical history significant for diagnosis of breast cancer status post bilateral mastectomy and chemotherapy.  History of chronic fatigue, history of pernicious anemia, and sleep apnea.  Works as a pharmacist at [employer].  Lives with her son.    2. Other fatigue  New to me  Patient has history of chronic fatigue, but for the past 3 to 4 months for fatigue has been increasing and she is feeling tired all the time with the need to sleep all day long, affecting her lifestyle and her job performance in general.  It is also affecting her mood and emotions, she has not been less tolerant to her son who is 10 years old and he is the one who is helping her all the time to do the chores at home.    3. Malignant neoplasm of lower-inner quadrant of right breast of female, estrogen receptor positive  History of breast cancer diagnosed in 2020, status post bilateral mastectomy, she continues to take  Tamoxifen and she continues to follow-up with oncology.    4. Pernicious anemia  Chronic, patient on B12 supplements, discussed with the patient to recheck labs    5. Sleep apnea, unspecified type  Chronic, diagnosed several years ago, now she is having increased fatigability, she continues to use her CPAP.  Recently was prescribed with stimulants by her sleep specialist but she stopped them because its not helping her increased fatigability    Patient Active Problem List    Diagnosis Date Noted    Encounter to establish care with new doctor 05/12/2023    Palpitations     Condition influencing health status 04/28/2021    Adjustment disorder with mixed anxiety and depressed mood 10/05/2020     Malignant neoplasm of lower-inner quadrant of right female breast (HCC) 03/27/2020    Macrocytosis 04/26/2019    Pernicious anemia 12/20/2017    MICHELLE (generalized anxiety disorder) 10/25/2017    Health care maintenance 10/25/2017       Current Outpatient Medications   Medication Sig Dispense Refill    lidocaine (LIDODERM) 5 % Patch APPLY 1 PATCH TOPICALLY TO CLEAN, DRY SKIN. LEAVE ON FOR 24 HOURS THEN REMOVE.  MUST WAIT AT LEAST 12 HOURS BEFORE APPLYING PATCH(ES) AGAIN.      ondansetron (ZOFRAN) 4 MG Tab tablet Take 4 mg by mouth 3 times a day as needed.      cyclobenzaprine (FLEXERIL) 10 mg Tab cyclobenzaprine 10 mg tablet   TAKE 1 TABLET BY MOUTH IN THE EVENING      CALCIUM-VITAMIN D PO Take 500 mg by mouth every day.      hydrocortisone (ANUSOL-HC) 25 MG Suppos Anucort-HC 25 mg suppository  INSERT 1 SUPPOSITORY RECTALLY ONCE DAILY AS NEEDED 30 Suppository 0    venlafaxine (EFFEXOR-XR) 150 MG extended-release capsule venlafaxine  mg capsule,extended release 24 hr  TAKE 1 CAPSULE BY MOUTH ONCE DAILY (Patient taking differently: 75 mg. venlafaxine  mg capsule,extended release 24 hr  TAKE 1 CAPSULE BY MOUTH ONCE DAILY) 90 Capsule 0    ergocalciferol (DRISDOL) 70914 UNIT capsule Take 50,000 Units by mouth see administration instructions. On Sunday and Wed      tamoxifen (NOLVADEX) 20 MG tablet Take 20 mg by mouth every 48 hours.      Multiple Vitamin (MULTI-VITAMIN DAILY PO) Take 1 Tablet by mouth every day.      Cyanocobalamin (VITAMIN B 12 PO) Take 1 Tab by mouth every day.      folic acid (FOLVITE) 1 MG TABS Take 1 mg by mouth every day.       No current facility-administered medications for this visit.         Allergies as of 05/12/2023 - Reviewed 05/12/2023   Allergen Reaction Noted    Shellfish allergy Hives 03/15/2010    Nickel Itching 03/24/2020    Trazodone  09/26/2022        ROS: Denies any chest pain, Shortness of breath, Changes bowel or bladder, Lower extremity edema.    Physical  Exam:  Gen.: Well-developed, well-nourished, no apparent distress,pleasant and cooperative with the examination  Skin:  Warm and dry with good turgor. No rashes or suspicious lesions in visible areas  Eye: PERRLA, conjunctiva and sclera clear, lids normal  HEENT: Normocephalic/atraumatic, sinuses nontender with palpation, TMs clear, nares patent with pink mucosa and clear rhinorrhea, lips without lesions, oropharynx clear.  Neck: Trachea midline,no masses or adenopathy  Thyroid: normal consistency and size. No masses or nodules. Not tender with palpation.  Cor: Regular rate and rhythm without murmur, gallop or rub.  Lungs: Respirations unlabored.Clear to auscultation with equal breath sounds bilaterally. No wheezes, rhonchi.  Abdomen: Soft nontender without hepatosplenomegaly or masses appreciated, normoactive bowel sounds. No hernias.  Extremities: No cyanosis, clubbing or edema, Symmetrical without deformities or malformations. Pulses 2+ and symmetrical both upper and lower extremities  Lymphatic: No abnormal adenopathy of the neck groin or axillae.  Psych: Alert and oriented x 3.Normal affect, judgement,insight and memory.        Assessment and Plan.   42 y.o. female here to establish care    1. Encounter to establish care with new doctor  Reviewed medical history as above, will do health maintenance topic next visit in update and address    2. Other fatigue  History of chronic fatigue, with acute exacerbations of her fatigue increased fatigability that is affecting her lifestyle, for the past 3 to 4 months, advised to do full work-up and come back for further assessment, continue hydration and pushing fluids  - CBC WITH DIFFERENTIAL; Future  - Comp Metabolic Panel; Future  - TSH WITH REFLEX TO FT4; Future  - VITAMIN D,25 HYDROXY (DEFICIENCY); Future  - VITAMIN B12; Future  - FOLATE; Future  - Lipid Profile; Future    3. Malignant neoplasm of lower-inner quadrant of right breast of female, estrogen receptor  positive (HCC)  Chronic, continues to follow-up with oncology, status post chemotherapy and bilateral mastectomy, on tamoxifen at this time    4. Pernicious anemia  Chronic, by history.  Recheck B12 level and CBC    5. Sleep apnea, unspecified type  Chronic, uncontrolled fatigue as described above, recheck CPAP setting and possibly to do another sleep study.  Follow-up with sleep specialist.  referral to Pulmonary and Sleep Medicine

## 2023-05-16 LAB
25(OH)D3+25(OH)D2 SERPL-MCNC: 116 NG/ML (ref 30–100)
ALBUMIN SERPL-MCNC: 4.1 G/DL (ref 3.8–4.8)
ALBUMIN/GLOB SERPL: 1.7 {RATIO} (ref 1.2–2.2)
ALP SERPL-CCNC: 47 IU/L (ref 44–121)
ALT SERPL-CCNC: 15 IU/L (ref 0–32)
AST SERPL-CCNC: 18 IU/L (ref 0–40)
BASOPHILS # BLD AUTO: 0 X10E3/UL (ref 0–0.2)
BASOPHILS NFR BLD AUTO: 0 %
BILIRUB SERPL-MCNC: 0.3 MG/DL (ref 0–1.2)
BUN SERPL-MCNC: 9 MG/DL (ref 6–24)
BUN/CREAT SERPL: 12 (ref 9–23)
CALCIUM SERPL-MCNC: 9.2 MG/DL (ref 8.7–10.2)
CHLORIDE SERPL-SCNC: 106 MMOL/L (ref 96–106)
CHOLEST SERPL-MCNC: 141 MG/DL (ref 100–199)
CO2 SERPL-SCNC: 21 MMOL/L (ref 20–29)
CREAT SERPL-MCNC: 0.75 MG/DL (ref 0.57–1)
EGFRCR SERPLBLD CKD-EPI 2021: 101 ML/MIN/1.73
EOSINOPHIL # BLD AUTO: 0.1 X10E3/UL (ref 0–0.4)
EOSINOPHIL NFR BLD AUTO: 2 %
ERYTHROCYTE [DISTWIDTH] IN BLOOD BY AUTOMATED COUNT: 11.1 % (ref 11.7–15.4)
FOLATE SERPL-MCNC: >20 NG/ML
GLOBULIN SER CALC-MCNC: 2.4 G/DL (ref 1.5–4.5)
GLUCOSE SERPL-MCNC: 98 MG/DL (ref 70–99)
HCT VFR BLD AUTO: 38.6 % (ref 34–46.6)
HDLC SERPL-MCNC: 43 MG/DL
HGB BLD-MCNC: 13 G/DL (ref 11.1–15.9)
IMM GRANULOCYTES # BLD AUTO: 0 X10E3/UL (ref 0–0.1)
IMM GRANULOCYTES NFR BLD AUTO: 0 %
IMMATURE CELLS  115398: ABNORMAL
LABORATORY COMMENT REPORT: NORMAL
LDLC SERPL CALC-MCNC: 78 MG/DL (ref 0–99)
LYMPHOCYTES # BLD AUTO: 1.4 X10E3/UL (ref 0.7–3.1)
LYMPHOCYTES NFR BLD AUTO: 28 %
MCH RBC QN AUTO: 33.8 PG (ref 26.6–33)
MCHC RBC AUTO-ENTMCNC: 33.7 G/DL (ref 31.5–35.7)
MCV RBC AUTO: 100 FL (ref 79–97)
MONOCYTES # BLD AUTO: 0.4 X10E3/UL (ref 0.1–0.9)
MONOCYTES NFR BLD AUTO: 8 %
MORPHOLOGY BLD-IMP: ABNORMAL
NEUTROPHILS # BLD AUTO: 3 X10E3/UL (ref 1.4–7)
NEUTROPHILS NFR BLD AUTO: 62 %
NRBC BLD AUTO-RTO: ABNORMAL %
PLATELET # BLD AUTO: 353 X10E3/UL (ref 150–450)
POTASSIUM SERPL-SCNC: 4 MMOL/L (ref 3.5–5.2)
PROT SERPL-MCNC: 6.5 G/DL (ref 6–8.5)
RBC # BLD AUTO: 3.85 X10E6/UL (ref 3.77–5.28)
SODIUM SERPL-SCNC: 142 MMOL/L (ref 134–144)
TRIGL SERPL-MCNC: 110 MG/DL (ref 0–149)
TSH SERPL DL<=0.005 MIU/L-ACNC: 1.39 UIU/ML (ref 0.45–4.5)
VIT B12 SERPL-MCNC: 772 PG/ML (ref 232–1245)
VLDLC SERPL CALC-MCNC: 20 MG/DL (ref 5–40)
WBC # BLD AUTO: 4.8 X10E3/UL (ref 3.4–10.8)

## 2023-05-21 NOTE — PROGRESS NOTES
CC: Reevaluation of ongoing fatigue and sleepiness        HPI:  Tiarra Chavez is a 43 y.o.female  kindly referred by Jamil Johnston M.D. who wanted her sleep apnea reassessed as she is having uncontrolled fatigue and sleepiness despite using CPAP and last seen via telemedicine on 2/28/2023.  At that time her 30-day compliance was 100% for 7 hours and 26 minutes.  On APAP 5-15 cm water her average residual estimated apnea-hypopnea index was normal 0.8.  Her median leak was 0.4 L/min with a 95th percentile of 11.7 L/min    Despite a normalized AHI, she had complaints of ongoing fatigue, EDS, and reduced energy levels.  We prescribed modafinil 100 mg daily to see if that would help but apparently it did not and gave her blurry vision. She continues with her current symptoms.      Her labs were reviewed.  Her CBC was normal save for slight elevation in MCV and MCH and reduction in the RDW, her CHEM panel and lipid panels were entirely normal without any abnormalities, but for 25 hydroxy vitamin D 25 was 116.0 ng/mL which is elevated beyond the high normal to 100 ng/mL.    APAP 5-15 cm water was ordered after a 1/6/2020 HST showed an GINA of 13.2 with a corey saturation of 82%.       Significant comorbidities modifying factors include palpitations, mastectomy for breast cancer status post chemo (followed by Dr. Hahn),  excision of a chest wall mass (path showed fibroadipose tissue, no malignancy),never smoker, macrocytosis, up-to-date with age-appropriate vaccinations, B12 deficiency for which she receives B12 injections every 7 days, and vitamin D deficiency.      Patient Active Problem List    Diagnosis Date Noted    Encounter to establish care with new doctor 05/12/2023    Palpitations     Condition influencing health status 04/28/2021    Adjustment disorder with mixed anxiety and depressed mood 10/05/2020    Malignant neoplasm of lower-inner quadrant of right female breast (HCC) 03/27/2020    Macrocytosis  "04/26/2019    Pernicious anemia 12/20/2017    MICHELLE (generalized anxiety disorder) 10/25/2017    Health care maintenance 10/25/2017       Past Medical History:   Diagnosis Date    Allergic rhinitis 4/7/2011    Cancer (HCC) 2020    breast right    Headache(784.0)     When in school, not a problem now.  With sleep deprivation.    Palpitations     Pernicious anemia     Snoring     Urinary incontinence     minor        Past Surgical History:   Procedure Laterality Date    MASS EXCISION GENERAL Left 1/10/2022    Procedure: EXCISION, MASS - CHEST WALL;  Surgeon: Sophy Holbrook M.D.;  Location: SURGERY [facility];  Service: General    PB MASTECTOMY, SIMPLE, COMPLETE Bilateral 3/26/2020    Procedure: MASTECTOMY;  Surgeon: Sophy Holbrook M.D.;  Location: SURGERY [facility];  Service: General    NODE BIOPSY SENTINEL Right 3/26/2020    Procedure: BIOPSY, LYMPH NODE, SENTINEL;  Surgeon: Sophy Holbrook M.D.;  Location: SURGERY [facility];  Service: General    BREAST BIOPSY Right 01/06/2020    Rt Breast Bx    VAGINAL PERINEAL EXAM REPAIR  5/14/2012    Performed by UHSSAIN RANGEL at [facility] DEL[facility]ERY    LUMPECTOMY  2001    \"[facility]    OTHER Left 1981    laceration repair  left leg       Family History   Problem Relation Age of Onset    Hypertension Mother     Thyroid Mother         Hypothyroid    Hyperlipidemia Father     Cancer Father         T cell lymphoma    Cancer Paternal Uncle         lymphoma    Heart Disease Maternal Grandfather         MI 38 yo, unknown RF.       Social History     Socioeconomic History    Marital status: Single     Spouse name: Not on file    Number of children: Not on file    Years of education: Not on file    Highest education level: Not on file   Occupational History     Comment: pharmacict   Tobacco Use    Smoking status: Never    Smokeless tobacco: Never   Vaping Use    Vaping Use: Never used   Substance and Sexual Activity    Alcohol use: Never    Drug use: Not Currently "     Types: Marijuana, Oral     Comment: couple times a week     Sexual activity: Yes     Partners: Male     Birth control/protection: Condom, Pill   Other Topics Concern    Not on file   Social History Narrative    Not on file     Social Determinants of Health     Financial Resource Strain: Not on file   Food Insecurity: Not on file   Transportation Needs: Not on file   Physical Activity: Not on file   Stress: Not on file   Social Connections: Not on file   Intimate Partner Violence: Not on file   Housing Stability: Not on file       Current Outpatient Medications   Medication Sig Dispense Refill    lidocaine (LIDODERM) 5 % Patch APPLY 1 PATCH TOPICALLY TO CLEAN, DRY SKIN. LEAVE ON FOR 24 HOURS THEN REMOVE.  MUST WAIT AT LEAST 12 HOURS BEFORE APPLYING PATCH(ES) AGAIN.      ondansetron (ZOFRAN) 4 MG Tab tablet Take 4 mg by mouth 3 times a day as needed.      cyclobenzaprine (FLEXERIL) 10 mg Tab cyclobenzaprine 10 mg tablet   TAKE 1 TABLET BY MOUTH IN THE EVENING      CALCIUM-VITAMIN D PO Take 500 mg by mouth every day.      hydrocortisone (ANUSOL-HC) 25 MG Suppos Anucort-HC 25 mg suppository  INSERT 1 SUPPOSITORY RECTALLY ONCE DAILY AS NEEDED 30 Suppository 0    venlafaxine (EFFEXOR-XR) 150 MG extended-release capsule venlafaxine  mg capsule,extended release 24 hr  TAKE 1 CAPSULE BY MOUTH ONCE DAILY (Patient taking differently: 75 mg. venlafaxine  mg capsule,extended release 24 hr  TAKE 1 CAPSULE BY MOUTH ONCE DAILY) 90 Capsule 0    tamoxifen (NOLVADEX) 20 MG tablet Take 20 mg by mouth every 48 hours.      Multiple Vitamin (MULTI-VITAMIN DAILY PO) Take 1 Tablet by mouth every day.      Cyanocobalamin (VITAMIN B 12 PO) Take 1 Tab by mouth every day.      folic acid (FOLVITE) 1 MG TABS Take 1 mg by mouth every day.      ergocalciferol (DRISDOL) 11310 UNIT capsule Take 50,000 Units by mouth see administration instructions. On Sunday and Wed       No current facility-administered medications for this visit.  "   \"CURRENT RX\"    ALLERGIES: Shellfish allergy, Nickel, and Trazodone    ROS    Constitutional: Denies fever, chills, sweats,  weight loss, fatigue  Cardiovascular: Denies chest pain, tightness, palpitations, swelling in legs/feet, fainting, difficulty breathing when lying down but gets better when sitting up.   Respiratory: Denies shortness of breath, cough, sputum, wheezing, painful breathing, coughing up blood.   Sleep: per HPI  Gastrointestinal: Denies  difficulty swallowing, nausea, abdominal pain, diarrhea, constipation, heartburn.  Musculoskeletal: Denies painful joints, sore muscles, back pain.        PHYSICAL EXAM      /80 (BP Location: Left arm, Patient Position: Sitting, BP Cuff Size: Adult)   Pulse 96   Resp 16   Ht 1.689 m (5' 6.5\")   Wt 73.5 kg (162 lb)   SpO2 95%   BMI 25.76 kg/m²   Appearance: Well-nourished, well-developed, no acute distress  Eyes:  PERRLA, EOMI  ENMT: without change  Neck: Supple, trachea midline, no masses  Respiratory effort:  No intercostal retractions or use of accessory muscles  Lung auscultation:  No wheezes rhonchi rubs or rales  Cardiac: No murmurs, rubs, or gallops; regular rhythm, normal rate; no edema  Abdomen:  No tenderness, no organomegaly.  Musculoskeletal:  Grossly normal; gait and station normal; digits and nails normal  Skin:  No rashes, petechiae, cyanosis  Neurologic: without focal signs; oriented to person, time, place, and purpose; judgement intact  Psychiatric:  No depression, anxiety, agitation      Medical Decision Making       The medical record was reviewed in its entirety including the referral notes, records from primary care, consultants notes, hospital records, lab, imaging, microbiology, immunology, and immunizations.  Care gaps identified and reviewed with the patient.    Diagnostic and titration nocturnal polysomnogram's, home sleep apnea tests, continuous nocturnal oximetry results, multiple sleep latency tests, and compliance reports " reviewed.  1. CASSIE (obstructive sleep apnea)  - Overnight Home Sleep Study; Future      PLAN:   Has been advised to continue the current pap, clean equipment frequently, and get new mask and supplies as allowed by insurance and DME. Advised about potential problems including nasal obstruction/stuffiness, mask leak or discomfort , frequent awakenings, chill or dryness of the upper airway, noise, inconvenience, and lack of benefit. Recommend an earlier appointment, if significant treatment barriers develop.    The risks of untreated sleep apnea were discussed with the patient at length. Patients with CASSIE are at increased risk of cardiovascular disease including systemic arterial hypertension, pulmonary arterial hypertension (transient or fixed), TIA, and an elevated risk of stroke, heart attack, sudden death, or arrhythmia between the hours of 11 PM and 6 AM. CASSIE patients have an increased risk of motor vehicle accidents, type 2 diabetes, GERD, repetitive mechanical trauma to pharyngeal muscles with inflammation and denervation, frequent EEG arousals leading to nonrestorative sleep, excessive daytime sleepiness, fatigue, depression, poor pain control, irritability, and lower quality of life.  The patient was advised to avoid driving a motor vehicle when drowsy.    Positive airway pressure will favorably impact many of the adverse conditions and effects provoked by CASSIE.    Have advised the patient to follow up with the appropriate healthcare practitioners for all other medical problems and issues.    Return for after sleep study.    Total time 30 minutes    Given her profound fatigue, will schedule her for a repeat HST.  This time we will use the WatchPAT technology which is much more accurate.    I am also concerned that her medications may be contributing to her fatigue and sleepiness.  Her vitamin D level is elevated and have requested she reduce her dose.  It also reports that she discussed her tamoxifen dose with  oncology as it may be an important factor as well.

## 2023-05-22 ENCOUNTER — OFFICE VISIT (OUTPATIENT)
Dept: MEDICAL GROUP | Facility: LAB | Age: 43
End: 2023-05-22
Payer: COMMERCIAL

## 2023-05-22 ENCOUNTER — OFFICE VISIT (OUTPATIENT)
Dept: SLEEP MEDICINE | Facility: MEDICAL CENTER | Age: 43
End: 2023-05-22
Attending: INTERNAL MEDICINE
Payer: COMMERCIAL

## 2023-05-22 VITALS
HEIGHT: 66 IN | WEIGHT: 162 LBS | OXYGEN SATURATION: 96 % | HEART RATE: 88 BPM | DIASTOLIC BLOOD PRESSURE: 60 MMHG | SYSTOLIC BLOOD PRESSURE: 110 MMHG | BODY MASS INDEX: 26.03 KG/M2 | TEMPERATURE: 96.8 F

## 2023-05-22 VITALS
SYSTOLIC BLOOD PRESSURE: 124 MMHG | HEIGHT: 67 IN | WEIGHT: 162 LBS | OXYGEN SATURATION: 95 % | BODY MASS INDEX: 25.43 KG/M2 | RESPIRATION RATE: 16 BRPM | DIASTOLIC BLOOD PRESSURE: 80 MMHG | HEART RATE: 96 BPM

## 2023-05-22 DIAGNOSIS — C50.311 MALIGNANT NEOPLASM OF LOWER-INNER QUADRANT OF RIGHT BREAST OF FEMALE, ESTROGEN RECEPTOR POSITIVE (HCC): ICD-10-CM

## 2023-05-22 DIAGNOSIS — G47.33 OSA (OBSTRUCTIVE SLEEP APNEA): ICD-10-CM

## 2023-05-22 DIAGNOSIS — D75.89 MACROCYTOSIS: ICD-10-CM

## 2023-05-22 DIAGNOSIS — Z17.0 MALIGNANT NEOPLASM OF LOWER-INNER QUADRANT OF RIGHT BREAST OF FEMALE, ESTROGEN RECEPTOR POSITIVE (HCC): ICD-10-CM

## 2023-05-22 DIAGNOSIS — E67.3 HIGH VITAMIN D LEVEL: ICD-10-CM

## 2023-05-22 DIAGNOSIS — R53.82 CHRONIC FATIGUE: ICD-10-CM

## 2023-05-22 PROBLEM — F41.9 ANXIETY: Status: ACTIVE | Noted: 2023-02-09

## 2023-05-22 PROBLEM — C50.919 MALIGNANT NEOPLASM OF FEMALE BREAST (HCC): Status: ACTIVE | Noted: 2023-02-09

## 2023-05-22 PROCEDURE — 3078F DIAST BP <80 MM HG: CPT | Performed by: FAMILY MEDICINE

## 2023-05-22 PROCEDURE — 3074F SYST BP LT 130 MM HG: CPT | Performed by: INTERNAL MEDICINE

## 2023-05-22 PROCEDURE — 99213 OFFICE O/P EST LOW 20 MIN: CPT | Performed by: INTERNAL MEDICINE

## 2023-05-22 PROCEDURE — 3074F SYST BP LT 130 MM HG: CPT | Performed by: FAMILY MEDICINE

## 2023-05-22 PROCEDURE — 99214 OFFICE O/P EST MOD 30 MIN: CPT | Performed by: FAMILY MEDICINE

## 2023-05-22 PROCEDURE — 99214 OFFICE O/P EST MOD 30 MIN: CPT | Performed by: INTERNAL MEDICINE

## 2023-05-22 PROCEDURE — 3079F DIAST BP 80-89 MM HG: CPT | Performed by: INTERNAL MEDICINE

## 2023-05-22 RX ORDER — DULOXETIN HYDROCHLORIDE 20 MG/1
20 CAPSULE, DELAYED RELEASE ORAL DAILY
Qty: 30 CAPSULE | Refills: 3 | Status: SHIPPED | OUTPATIENT
Start: 2023-05-22 | End: 2023-07-07

## 2023-05-22 ASSESSMENT — FIBROSIS 4 INDEX
FIB4 SCORE: 0.58
FIB4 SCORE: 0.58

## 2023-05-22 NOTE — PROGRESS NOTES
Chief Complaint:   Chief Complaint   Patient presents with    Follow-Up     Labs. Fatigue.       HPI: Established patient for follow-up, discussed the following today:  Tiarra Chavez is a 43 y.o. female who presents for::    1. Chronic fatigue  Chronic ongoing, recently seen sleep specialist, and we will recheck with another sleep test.  Continues to have chronic fatigue    2. Macrocytosis follow-up, discussed the following today: After reviewing all her lab work results with her  Chronic ongoing, history of pernicious anemia, normal hemoglobin, no concerns other than chronic fatigue    3. Malignant neoplasm of lower-inner quadrant of right breast of female, estrogen receptor positive   Chronic, status post bilateral mastectomy patient continues to follow-up with oncologist    4. High vitamin D level  High levels of vitamin D, discussed with the patient to stop supplements.          Past medical history, family history, social history and medications reviewed and updated in the record.  Today  Current medications, problem list and allergies reviewed in [facility] today  Health maintenance topics are reviewed and updated.    Patient Active Problem List    Diagnosis Date Noted    Encounter to establish care with new doctor 05/12/2023    Anxiety 02/09/2023    Malignant neoplasm of female breast (HCC) 02/09/2023    Palpitations     Condition influencing health status 04/28/2021    Adjustment disorder with mixed anxiety and depressed mood 10/05/2020    Malignant neoplasm of lower-inner quadrant of right female breast (HCC) 03/27/2020    Macrocytosis 04/26/2019    Pernicious anemia 12/20/2017    MICHELLE (generalized anxiety disorder) 10/25/2017    Health care maintenance 10/25/2017     Family History   Problem Relation Age of Onset    Hypertension Mother     Thyroid Mother         Hypothyroid    Hyperlipidemia Father     Cancer Father         T cell lymphoma    Cancer Paternal Uncle         lymphoma    Heart Disease Maternal  Grandfather         MI 40 yo, unknown RF.     Social History     Socioeconomic History    Marital status: Single     Spouse name: Not on file    Number of children: Not on file    Years of education: Not on file    Highest education level: Not on file   Occupational History     Comment: pharmacict   Tobacco Use    Smoking status: Never    Smokeless tobacco: Never   Vaping Use    Vaping Use: Never used   Substance and Sexual Activity    Alcohol use: Never    Drug use: Not Currently     Types: Marijuana, Oral     Comment: couple times a week     Sexual activity: Yes     Partners: Male     Birth control/protection: Condom, Pill   Other Topics Concern    Not on file   Social History Narrative    Not on file     Social Determinants of Health     Financial Resource Strain: Not on file   Food Insecurity: Not on file   Transportation Needs: Not on file   Physical Activity: Not on file   Stress: Not on file   Social Connections: Not on file   Intimate Partner Violence: Not on file   Housing Stability: Not on file       Current Outpatient Medications   Medication Sig Dispense Refill    DULoxetine (CYMBALTA) 20 MG Cap DR Particles Take 1 Capsule by mouth every day. 30 Capsule 3    lidocaine (LIDODERM) 5 % Patch APPLY 1 PATCH TOPICALLY TO CLEAN, DRY SKIN. LEAVE ON FOR 24 HOURS THEN REMOVE.  MUST WAIT AT LEAST 12 HOURS BEFORE APPLYING PATCH(ES) AGAIN.      ondansetron (ZOFRAN) 4 MG Tab tablet Take 4 mg by mouth 3 times a day as needed.      cyclobenzaprine (FLEXERIL) 10 mg Tab cyclobenzaprine 10 mg tablet   TAKE 1 TABLET BY MOUTH IN THE EVENING      CALCIUM-VITAMIN D PO Take 500 mg by mouth every day.      hydrocortisone (ANUSOL-HC) 25 MG Suppos Anucort-HC 25 mg suppository  INSERT 1 SUPPOSITORY RECTALLY ONCE DAILY AS NEEDED 30 Suppository 0    ergocalciferol (DRISDOL) 99787 UNIT capsule Take 50,000 Units by mouth see administration instructions. On Sunday and Wed      tamoxifen (NOLVADEX) 20 MG tablet Take 20 mg by mouth every  "48 hours.      Multiple Vitamin (MULTI-VITAMIN DAILY PO) Take 1 Tablet by mouth every day.      Cyanocobalamin (VITAMIN B 12 PO) Take 1 Tab by mouth every day.      folic acid (FOLVITE) 1 MG TABS Take 1 mg by mouth every day.       No current facility-administered medications for this visit.         Review Of Systems  As documented in HPI above  PHYSICAL EXAMINATION:    /60 (BP Location: Left arm, Patient Position: Sitting, BP Cuff Size: Adult)   Pulse 88   Temp 36 °C (96.8 °F) (Temporal)   Ht 1.676 m (5' 6\")   Wt 73.5 kg (162 lb)   SpO2 96%   BMI 26.15 kg/m²   Gen.: Well-developed, well-nourished, no apparent distress, pleasant and cooperative with the examination  HEENT: Normocephalic/atraumatic,   Extremities: No cyanosis, clubbing or edema       ASSESSMENT/Plan:  1. Chronic fatigue  Chronic ongoing, unresolved.  We will wait for sleep study result as per notes from sleep specialist, reviewed today.  Discussed with the patient starting Cymbalta to treat chronic fatigue tiredness and pain, patient will discuss it further with her endocrinologist follow-up in 6 weeks  DULoxetine (CYMBALTA) 20 MG Cap DR Particles      2. Macrocytosis  Chronic, possibly related to history of pernicious anemia, advised to continue supplements with B12, will consider B12 injection to see if that can help with chronic fatigue and improvement of her MCV      3. Malignant neoplasm of lower-inner quadrant of right breast of female, estrogen receptor positive (HCC)  Chronic, status post bilateral mastectomy follow-up with oncology as directed      4. High vitamin D level  Patient to hold her vitamin D supplements.         Please note that this dictation was created using voice recognition software. I have worked with consultants from the vendor as well as technical experts from [employer] to optimize the interface. I have made every reasonable attempt to correct obvious errors, but I expect that there are errors of grammar " and possibly content that I did not discover before finalizing the note.

## 2023-06-06 ENCOUNTER — HOME STUDY (OUTPATIENT)
Dept: SLEEP MEDICINE | Facility: MEDICAL CENTER | Age: 43
End: 2023-06-06
Attending: INTERNAL MEDICINE
Payer: COMMERCIAL

## 2023-06-06 DIAGNOSIS — G47.33 OSA (OBSTRUCTIVE SLEEP APNEA): ICD-10-CM

## 2023-06-06 PROCEDURE — 95800 SLP STDY UNATTENDED: CPT | Performed by: INTERNAL MEDICINE

## 2023-06-08 NOTE — PROCEDURES
DIAGNOSTIC HOME SLEEP TEST (HST) REPORT WatchPAT      PATIENT ID:  NAME:  Tiarra Chavez  MRN:               [id number]  YOB: 1980  Date of study: 06/06/2023  Sleep Time: 7 hours 7 minutes  Portable monitor/device used: type IV (portable monitor/device)      Impression:        1.  No significant obstructive sleep apnea hypopnea with a PAT apnea hypopnea index (pAHI) of 3.8 (events per hour), a supine pAHI of 4.4,   and a PAT respiratory disturbance index (pRDI) of 5.7 (events per hour.)  These findings are based on 7 channels recording of PAT signal with sleep staging, heart rate, pulse oximetry, actigraphy, body position, snoring and respiratory movement.  The slightly elevated pRDI is consistent with mild upper airways resistance syndrome.    2. Oxygenation: O2 Sat. mean O2 sat was 95%,  the corey was 91%,  and the maximum O2 was 98%.  The O2 sat was at or  below 88% for 0.0 minutes of evaluation time. Oxygen Desaturation (>=4%) Index was 1.7 (events per hour.)  The mean HR was 78 BPM.      TECHNICAL DESCRIPTION: The patient underwent home sleep apnea testing using peripheral arterial tone signal technology(WatchPAT™). Monitoring was done with 7 channels recording of PAT signal with sleep staging, heart rate, pulse oximetry, actigraphy, body position, snoring and respiratory movement. Prior to using the device, the patient received verbal and written instructions for its application and was provided with the help desk phone number for additional telephonic instruction with 24-hour availability of qualified personnel to answer questions.      Respiratory events:    pRDI: Total 40 (REM index 11.7 /hr, NREM index 4.5 /hr, All Night 5.7 /hr)    3% pAHI: Total 27 (REM index 10.8 /hr, NREM index 2.5 /hr, All Night 3.8 /hr)    3% pAHIc: Total 1 (REM index 0.0 /hr, NREM index 0.2 /hr, All Night 0.1 /hr)    4% pAHI: Total 11 (All Night 1.6 /hr)    General sleep summary: . Total Recording Time is 8 hours 54 minutes  and Total Sleep Time is 7 hours 7 minutes. The patient spent 208.0 minutes in the supine position and 219.5 minutes in the nonsupine position.  Snoring exceeded the 45 dB threshold for 11.8 minutes or 2.8% of the TST.      Recommendations:    The patient has neither significant obstructive sleep apnea nor significant nocturnal hypoxemia.  There is no current indication for the use of CPAP or supplemental nocturnal oxygen.      NOTES:     The AHI is the number of apneas (cessation of airflow for 10 seconds or longer) and hypopneas (shallow breaths accompanied by at least a 3% drop in the oxygen saturation) that occur per hour. Less than 5 per hour is normal, 5-15 per hour is mild, 15-30 per hour moderate, and greater than 30 per hour severe.    Patients with sleep apnea are at increased risk of cardiovascular disease including systemic arterial hypertension, pulmonary arterial hypertension, (transient or fixed), transient ischemic attacks, and an elevated risk of stroke, heart attack, sudden death, or arrhythmia between the hours of 11 PM and 6 AM.  Sleep apnea patients have an increased risk of motor vehicle accidents, type 2 diabetes, gastroesophageal reflux, repetitive mechanical trauma to pharyngeal muscles with inflammation and denervation, frequent EEG arousals leading to nonrestorative sleep, excessive daytime somnolence, fatigue, depression, poor pain control, irritability, and a lower quality of life.      Dr. Douglas Phelps M.D.

## 2023-06-12 ENCOUNTER — APPOINTMENT (OUTPATIENT)
Dept: BEHAVIORAL HEALTH | Facility: CLINIC | Age: 43
End: 2023-06-12
Payer: COMMERCIAL

## 2023-06-21 ENCOUNTER — OFFICE VISIT (OUTPATIENT)
Dept: SLEEP MEDICINE | Facility: MEDICAL CENTER | Age: 43
End: 2023-06-21
Attending: PHYSICIAN ASSISTANT
Payer: COMMERCIAL

## 2023-06-21 VITALS
SYSTOLIC BLOOD PRESSURE: 116 MMHG | OXYGEN SATURATION: 96 % | BODY MASS INDEX: 25.23 KG/M2 | HEIGHT: 66 IN | WEIGHT: 157 LBS | HEART RATE: 70 BPM | DIASTOLIC BLOOD PRESSURE: 74 MMHG | RESPIRATION RATE: 16 BRPM

## 2023-06-21 DIAGNOSIS — G47.10 HYPERSOMNOLENCE: ICD-10-CM

## 2023-06-21 DIAGNOSIS — F41.9 ANXIETY: ICD-10-CM

## 2023-06-21 PROCEDURE — 99213 OFFICE O/P EST LOW 20 MIN: CPT | Performed by: PHYSICIAN ASSISTANT

## 2023-06-21 PROCEDURE — 3074F SYST BP LT 130 MM HG: CPT | Performed by: PHYSICIAN ASSISTANT

## 2023-06-21 PROCEDURE — 3078F DIAST BP <80 MM HG: CPT | Performed by: PHYSICIAN ASSISTANT

## 2023-06-21 PROCEDURE — 99212 OFFICE O/P EST SF 10 MIN: CPT | Performed by: PHYSICIAN ASSISTANT

## 2023-06-21 ASSESSMENT — ENCOUNTER SYMPTOMS
SPUTUM PRODUCTION: 0
FEVER: 0
COUGH: 1
WHEEZING: 0
SINUS PAIN: 0
WEIGHT LOSS: 0
DIZZINESS: 1
SHORTNESS OF BREATH: 1
HEARTBURN: 0
INSOMNIA: 1
ORTHOPNEA: 0
CHILLS: 0
SORE THROAT: 1
HEADACHES: 0
PALPITATIONS: 0
TREMORS: 0

## 2023-06-21 ASSESSMENT — FIBROSIS 4 INDEX: FIB4 SCORE: 0.58

## 2023-06-21 NOTE — PROGRESS NOTES
"Chief Complaint   Patient presents with    Apnea    Results     SS       HPI:  Tiarra Chavez is a 43 y.o. year old female here today for follow-up on  hypersomnolence .  Patient was initially started on PAP therapy in January 2022 following sleep study demonstrating mild to moderate sleep apnea.  She experienced increased symptoms and completed an overnight home sleep study which did not demonstrate sleep apnea despite her increase symptoms.  She did stop using her CPAP device and she is here to review those results today.  She is experiencing significant decrease in quality of life secondary to fatigue with inability to participate in her normal activity/hobbies.  She is a single mom.    Past Medical History: Palpitations, generalized anxiety, pernicious anemia, right-sided breast cancer in 2020.  Patient reports having lived in [facility] and moving to [facility] in childhood, subsequently living in [place] and then in [facility].  She is pharmacist.    Vitals:  /74   Pulse 70   Resp 16   Ht 1.676 m (5' 6\")   Wt 71.2 kg (157 lb)   SpO2 96%     Recent Imaging:     Including CT chest abdomen and pelvis obtained in 2021.  Demonstrating atelectasis left lower lobe, 10.5 mm hypodensity lateral segment left lobe of the liver unchanged.  Surgical breast, no evidence of metastatic disease fibroid uterus.    Previous echocardiogram obtained 11/5/2020 demonstrated normal ventricular chamber size, wall thickness, systolic function and diastolic function.  LVEF estimated 65%.  Normal right ventricular size and systolic function, trace tricuspid regurgitation with estimated RVSP of 25 mmHg.    Device obtained April 2022   DME provider [facility]  Mask interface small resmed  Airfit F30i    Home sleep study performed 1/6/2022 on a ResMed apnea link device demonstrated low O2 sat of 82%, average sat 893%.  With sats less than or equal to 88% for 8 minutes.  Snoring episodes recorded at 438.  Per pulmonologist " interpretation study consistent with mild to moderate obstructive sleep apnea, GINA of 13.2.    Home sleep test WatchPAT obtained 6/6/2023 demonstrated per pulmonologist report no significant CASSIE H with pAHI of 3.8/h, 4.4/h supine.  Low O2 sats 91% with baseline 95%.  Snoring reported as exceeding 45 dB or so for 11.8 minutes.  Patient reports not sleeping well for the study.    Sleep schedule goes to bed between 5 PM and 10 PM and wakens around 7 AM.   Symptoms experiences daytime somnolence , reports morning headaches , and naps during the day     Dixon Sleepiness Scale Total Score: 19 (6/21/2023 10:08 AM)           Review of Systems   Constitutional:  Positive for malaise/fatigue (severe, stopped extra curricular activity). Negative for chills, fever and weight loss.   HENT:  Positive for sore throat (in am) and tinnitus (left altered). Negative for congestion, hearing loss, nosebleeds and sinus pain.    Eyes:         Presc glasses   Respiratory:  Positive for cough (in am last week) and shortness of breath (episodic). Negative for sputum production and wheezing.    Cardiovascular:  Positive for chest pain (band like, negative cardiac work up). Negative for palpitations, orthopnea and leg swelling.   Gastrointestinal:  Negative for heartburn.        No dentures, no missing teeth, wears mouth guard for grinding    Neurological:  Positive for dizziness. Negative for tremors and headaches.   Psychiatric/Behavioral:  The patient has insomnia (maintaining sleep).        Past Medical History:   Diagnosis Date    Allergic rhinitis 4/7/2011    Cancer (HCC) 2020    breast right    Headache(784.0)     When in school, not a problem now.  With sleep deprivation.    Palpitations     Pernicious anemia     Snoring     Urinary incontinence     minor       Past Surgical History:   Procedure Laterality Date    MASS EXCISION GENERAL Left 1/10/2022    Procedure: EXCISION, MASS - CHEST WALL;  Surgeon: Sophy Holbrook M.D.;   "Location: SURGERY [facility];  Service: General    PB MASTECTOMY, SIMPLE, COMPLETE Bilateral 3/26/2020    Procedure: MASTECTOMY;  Surgeon: Sophy Holbrook M.D.;  Location: SURGERY [facility];  Service: General    NODE BIOPSY SENTINEL Right 3/26/2020    Procedure: BIOPSY, LYMPH NODE, SENTINEL;  Surgeon: Sophy Holbrook M.D.;  Location: SURGERY [facility];  Service: General    BREAST BIOPSY Right 01/06/2020    Rt Breast Bx    VAGINAL PERINEAL EXAM REPAIR  5/14/2012    Performed by HUSSAIN RANGEL at LABOR AND DELIVERY    LUMPECTOMY  2001    \"[facility]    OTHER Left 1981    laceration repair  left leg       Family History   Problem Relation Age of Onset    Hypertension Mother     Thyroid Mother         Hypothyroid    Hyperlipidemia Father     Cancer Father         T cell lymphoma    Cancer Paternal Uncle         lymphoma    Heart Disease Maternal Grandfather         MI 38 yo, unknown RF.       Social History     Socioeconomic History    Marital status: Single     Spouse name: Not on file    Number of children: Not on file    Years of education: Not on file    Highest education level: Not on file   Occupational History     Comment: pharmacict   Tobacco Use    Smoking status: Never    Smokeless tobacco: Never   Vaping Use    Vaping Use: Never used   Substance and Sexual Activity    Alcohol use: Never    Drug use: Not Currently     Types: Marijuana, Oral     Comment: couple times a week     Sexual activity: Yes     Partners: Male     Birth control/protection: Condom, Pill   Other Topics Concern    Not on file   Social History Narrative    Not on file     Social Determinants of Health     Financial Resource Strain: Not on file   Food Insecurity: Not on file   Transportation Needs: Not on file   Physical Activity: Not on file   Stress: Not on file   Social Connections: Not on file   Intimate Partner Violence: Not on file   Housing Stability: Not on file       Allergies as of 06/21/2023 - Reviewed " 06/21/2023   Allergen Reaction Noted    Shellfish allergy Hives 03/15/2010    Nickel Itching 03/24/2020    Trazodone  09/26/2022          Current medications as of today   Current Outpatient Medications   Medication Sig Dispense Refill    DULoxetine (CYMBALTA) 20 MG Cap DR Particles Take 1 Capsule by mouth every day. 30 Capsule 3    lidocaine (LIDODERM) 5 % Patch APPLY 1 PATCH TOPICALLY TO CLEAN, DRY SKIN. LEAVE ON FOR 24 HOURS THEN REMOVE.  MUST WAIT AT LEAST 12 HOURS BEFORE APPLYING PATCH(ES) AGAIN.      ondansetron (ZOFRAN) 4 MG Tab tablet Take 4 mg by mouth 3 times a day as needed.      cyclobenzaprine (FLEXERIL) 10 mg Tab cyclobenzaprine 10 mg tablet   TAKE 1 TABLET BY MOUTH IN THE EVENING      CALCIUM-VITAMIN D PO Take 500 mg by mouth every day.      hydrocortisone (ANUSOL-HC) 25 MG Suppos Anucort-HC 25 mg suppository  INSERT 1 SUPPOSITORY RECTALLY ONCE DAILY AS NEEDED 30 Suppository 0    tamoxifen (NOLVADEX) 20 MG tablet Take 20 mg by mouth every 48 hours.      Multiple Vitamin (MULTI-VITAMIN DAILY PO) Take 1 Tablet by mouth every day.      Cyanocobalamin (VITAMIN B 12 PO) Take 1 Tab by mouth every day.      folic acid (FOLVITE) 1 MG TABS Take 1 mg by mouth every day.      ergocalciferol (DRISDOL) 84760 UNIT capsule Take 50,000 Units by mouth see administration instructions. On Sunday and Wed       No current facility-administered medications for this visit.         Physical Exam:   Gen:           Alert and oriented, No apparent distress. Mood and affect appropriate, normal interaction with examiner.   Hearing:     Grossly intact.  Nose:          Normal, no lesions or deformities.  Dentition:    Good dentition.   Oropharynx:   Tongue normal, posterior pharynx without erythema or exudate.  Mallampati Classification: II  Neck:        Supple, trachea midline, no masses.  Respiratory Effort: No intercostal retractions or use of accessory muscles.   Gait and Station: Normal.  Digits and Nails: No clubbing,  cyanosis, petechiae, or nodes.   Skin:        No rashes, lesions or ulcers noted.               Ext:           No cyanosis or edema.      Immunizations:  Flu: 8/1/2022  PCV 20: 9/15/2022  Moderna SARS CoV2 Vaccine: 8/15/2021, 2/5/2021, 1/8/2021,  Pfizer booster SARS-CoV-2 vaccine: 9/15/2020    Assessment / Plan:  1. Hypersomnolence  - Polysomnography, 4 or More    2. Anxiety  Concern regarding previous diagnosis of sleep apnea and more recent test results.  Support given.      Patient continues to experience significant daytime sleepiness and fatigue.  HSTs often underestimate sleep apnea in women. A negative HST should be followed by a laboratory sleep study (polysomnography) if the clinical suspicion for sleep apnea is high. In women, the common co-occurrence of insomnia and sleep apnea may increase the likelihood of a false negative home sleep study.  Recommendation is for a formal in lab sleep study which patient has not had and short-term follow-up to review these results.      Follow-up:   Return in about 4 weeks (around 7/19/2023) for Return with Paulina Chao PA-C.    Please note that this dictation was created using voice recognition software. I have made every reasonable attempt to correct obvious errors, but it is possible there are errors of grammar and possibly content that I did not discover before finalizing the note.

## 2023-06-21 NOTE — PATIENT INSTRUCTIONS
1-patient continues to experience significant daytime sleepiness and fatigue  2-reviewed overnight home sleep test  3-this type of testing may significantly underestimate hypopneas in women  4-formal in lab sleep test ordered  5-follow up in 4 weeks to review

## 2023-07-07 ENCOUNTER — OFFICE VISIT (OUTPATIENT)
Dept: MEDICAL GROUP | Facility: LAB | Age: 43
End: 2023-07-07
Payer: COMMERCIAL

## 2023-07-07 VITALS
RESPIRATION RATE: 16 BRPM | SYSTOLIC BLOOD PRESSURE: 122 MMHG | BODY MASS INDEX: 25.55 KG/M2 | DIASTOLIC BLOOD PRESSURE: 82 MMHG | HEIGHT: 66 IN | OXYGEN SATURATION: 96 % | WEIGHT: 159 LBS | HEART RATE: 82 BPM | TEMPERATURE: 97.6 F

## 2023-07-07 DIAGNOSIS — R53.82 CHRONIC FATIGUE: ICD-10-CM

## 2023-07-07 DIAGNOSIS — F33.1 MODERATE EPISODE OF RECURRENT MAJOR DEPRESSIVE DISORDER (HCC): ICD-10-CM

## 2023-07-07 DIAGNOSIS — D51.0 PERNICIOUS ANEMIA: ICD-10-CM

## 2023-07-07 PROBLEM — F32.A DEPRESSION: Status: ACTIVE | Noted: 2023-07-07

## 2023-07-07 PROCEDURE — 99214 OFFICE O/P EST MOD 30 MIN: CPT | Performed by: FAMILY MEDICINE

## 2023-07-07 PROCEDURE — 3079F DIAST BP 80-89 MM HG: CPT | Performed by: FAMILY MEDICINE

## 2023-07-07 PROCEDURE — 3074F SYST BP LT 130 MM HG: CPT | Performed by: FAMILY MEDICINE

## 2023-07-07 RX ORDER — CYANOCOBALAMIN 1000 UG/ML
1000 INJECTION, SOLUTION INTRAMUSCULAR; SUBCUTANEOUS ONCE
Status: COMPLETED | OUTPATIENT
Start: 2023-07-07 | End: 2023-07-07

## 2023-07-07 RX ORDER — BUPROPION HYDROCHLORIDE 100 MG/1
100 TABLET ORAL 2 TIMES DAILY
COMMUNITY
Start: 2023-06-19 | End: 2024-02-27

## 2023-07-07 RX ORDER — DESVENLAFAXINE SUCCINATE 50 MG/1
50 TABLET, EXTENDED RELEASE ORAL DAILY
COMMUNITY
Start: 2023-06-05 | End: 2024-02-27

## 2023-07-07 RX ADMIN — CYANOCOBALAMIN 1000 MCG: 1000 INJECTION, SOLUTION INTRAMUSCULAR; SUBCUTANEOUS at 16:32

## 2023-07-07 ASSESSMENT — FIBROSIS 4 INDEX: FIB4 SCORE: 0.58

## 2023-07-07 NOTE — PROGRESS NOTES
Chief Complaint:   Chief Complaint   Patient presents with    Follow-Up     6 week       HPI: Established patient  Tiarra Cahvez is a 43 y.o. female who presents for follow-up, discussed the following today:    1. Pernicious anemia  Macrocytosis on her CBC, patient with chronic fatigue.  Continues to follow-up with her oncologist hematologist to help with her history of breast cancer.      2. Chronic fatigue  Chronic fatigue, patient says Cymbalta made her very sleepy and her psychiatrist changed her to Pristiq which is working well in controlling her depression symptoms.  Still having a lot of tiredness especially with stress at work    3. Moderate episode of recurrent major depressive disorder  Chronic, improved a little bit on Pristiq, patient is taking 50 mg daily, no suicidal ideations or intentions to harm self or others, patient follows up with psychiatry        Past medical history, family history, social history and medications reviewed and updated in the record.  Today  Current medications, problem list and allergies reviewed in EPIC today  Health maintenance topics are reviewed and updated.    Patient Active Problem List    Diagnosis Date Noted    Encounter to establish care with new doctor 05/12/2023    Chronic fatigue 07/07/2023    Depression 07/07/2023    Anxiety 02/09/2023    Malignant neoplasm of female breast (HCC) 02/09/2023    Palpitations     Condition influencing health status 04/28/2021    Adjustment disorder with mixed anxiety and depressed mood 10/05/2020    Malignant neoplasm of lower-inner quadrant of right female breast (HCC) 03/27/2020    Macrocytosis 04/26/2019    Pernicious anemia 12/20/2017    MICHELLE (generalized anxiety disorder) 10/25/2017    Health care maintenance 10/25/2017     Family History   Problem Relation Age of Onset    Hypertension Mother     Thyroid Mother         Hypothyroid    Hyperlipidemia Father     Cancer Father         T cell lymphoma    Cancer Paternal Uncle          lymphoma    Heart Disease Maternal Grandfather         MI 40 yo, unknown RF.     Social History     Socioeconomic History    Marital status: Single     Spouse name: Not on file    Number of children: Not on file    Years of education: Not on file    Highest education level: Not on file   Occupational History     Comment: pharmacict   Tobacco Use    Smoking status: Never    Smokeless tobacco: Never   Vaping Use    Vaping Use: Never used   Substance and Sexual Activity    Alcohol use: Never    Drug use: Not Currently     Types: Marijuana, Oral     Comment: couple times a week     Sexual activity: Yes     Partners: Male     Birth control/protection: Condom, Pill   Other Topics Concern    Not on file   Social History Narrative    Not on file     Social Determinants of Health     Financial Resource Strain: Not on file   Food Insecurity: Not on file   Transportation Needs: Not on file   Physical Activity: Not on file   Stress: Not on file   Social Connections: Not on file   Intimate Partner Violence: Not on file   Housing Stability: Not on file     Current Outpatient Medications   Medication Sig Dispense Refill    buPROPion (WELLBUTRIN) 100 MG Tab Take 100 mg by mouth 2 times a day.      desvenlafaxine succinate (PRISTIQ) 50 MG TABLET SR 24 HR Take 50 mg by mouth every day.      lidocaine (LIDODERM) 5 % Patch APPLY 1 PATCH TOPICALLY TO CLEAN, DRY SKIN. LEAVE ON FOR 24 HOURS THEN REMOVE.  MUST WAIT AT LEAST 12 HOURS BEFORE APPLYING PATCH(ES) AGAIN.      ondansetron (ZOFRAN) 4 MG Tab tablet Take 4 mg by mouth 3 times a day as needed.      cyclobenzaprine (FLEXERIL) 10 mg Tab cyclobenzaprine 10 mg tablet   TAKE 1 TABLET BY MOUTH IN THE EVENING      CALCIUM-VITAMIN D PO Take 500 mg by mouth every day.      hydrocortisone (ANUSOL-HC) 25 MG Suppos Anucort-HC 25 mg suppository  INSERT 1 SUPPOSITORY RECTALLY ONCE DAILY AS NEEDED 30 Suppository 0    ergocalciferol (DRISDOL) 45648 UNIT capsule Take 50,000 Units by mouth see  "administration instructions. On Sunday and Wed      tamoxifen (NOLVADEX) 20 MG tablet Take 20 mg by mouth every 48 hours.      Multiple Vitamin (MULTI-VITAMIN DAILY PO) Take 1 Tablet by mouth every day.      Cyanocobalamin (VITAMIN B 12 PO) Take 1 Tab by mouth every day.      folic acid (FOLVITE) 1 MG TABS Take 1 mg by mouth every day.       Current Facility-Administered Medications   Medication Dose Route Frequency Provider Last Rate Last Admin    cyanocobalamin (Vitamin B-12) injection 1,000 mcg  1,000 mcg Intramuscular Once Jamil Johnston M.D.               Review Of Systems  As documented in HPI above  PHYSICAL EXAMINATION:    /82 (BP Location: Left arm, Patient Position: Sitting, BP Cuff Size: Adult)   Pulse 82   Temp 36.4 °C (97.6 °F) (Temporal)   Resp 16   Ht 1.676 m (5' 6\")   Wt 72.1 kg (159 lb)   SpO2 96%   BMI 25.66 kg/m²   Gen.: Well-developed, well-nourished, no apparent distress, pleasant and cooperative with the examination  HEENT: Normocephalic/atraumatic,   Neck: No JVD or bruits, no adenopathy  Cor: Regular rate and rhythm without murmur gallop or rub  Lungs: Clear to auscultation with equal breath sounds bilaterally. No wheezes, rhonchi.  Abdomen: Soft nontender without hepatosplenomegaly or masses appreciated, normoactive bowel sounds  Extremities: No cyanosis, clubbing or edema       ASSESSMENT/Plan:  1. Pernicious anemia  We will consider B12 injection to see if that can help with her chronic fatigue.  Follow-up with a hematologist,  cyanocobalamin (Vitamin B-12) injection 1,000 mcg      2. Chronic fatigue  Continue with Pristiq, recommend healthy lifestyle, B12 injection to see if that can give her a little bit of energy      3. Moderate episode of recurrent major depressive disorder (HCC)  Chronic better controlled on Pristiq, continue current regimen and follow-up with behavioral health.  No red flags         Please note that this dictation was created using voice recognition " software. I have made every reasonable attempt to correct obvious errors but there may be errors of grammar and content that I may have overlooked prior to finalization of this note.

## 2023-07-10 ENCOUNTER — SLEEP STUDY (OUTPATIENT)
Dept: SLEEP MEDICINE | Facility: MEDICAL CENTER | Age: 43
End: 2023-07-10
Attending: PHYSICIAN ASSISTANT
Payer: COMMERCIAL

## 2023-07-10 DIAGNOSIS — G47.10 HYPERSOMNOLENCE: ICD-10-CM

## 2023-07-10 PROCEDURE — 95810 POLYSOM 6/> YRS 4/> PARAM: CPT | Performed by: INTERNAL MEDICINE

## 2023-07-11 NOTE — PROCEDURES
Physician:   Patient: KIMBERLY WHEATLEY  ID: [id number] Date: 7/10/2023 Exam No.:   MONTAGE: Standard  STUDY TYPE: Diagnostic  RECORDING TECHNIQUE:   After the scalp was prepared, gold plated electrodes were applied to the scalp according to the International 10-20 System. EEG (electroencephalogram) was continuously monitored from the O1-M2, O2-M1, C3-M2, C4-M1, F3-M2, and F4-M1. EOGs (electrooculograms) were monitored by electrodes placed at the left and right outer canthi. Chin EMG (electromyogram) was monitored by electrodes placed on the mentalis and sub-mentalis muscles. Nasal and oral airflow were monitored using a triple port thermocouple as well as oronasal pressure transducer. Respiratory effort was measured by inductive plethysmography technology employing abdominal and thoracic belts. Blood oxygen saturation and pulse were monitored by pulse oximetry. Heart rhythm was monitored by surface electrocardiogram. Leg EMG was studied using surface electrodes placed on left and right anterior tibialis. A microphone was used to monitor tracheal sounds and snoring. Body position was monitored and documented by technician observation.   SCORING CRITERIA:   A modification of the AASM manual for scoring of sleep and associated events was used. Obstructive apneas were scored by cessation of airflow for at least 10 seconds with continuing respiratory effort. Central apneas were scored by cessation of airflow for at least 10 seconds with no respiratory effort. Hypopneas were scored by a 30% or more reduction in airflow for at least 10 seconds accompanied by arterial oxygen desaturation of 3% or an arousal. For CMS (Medicare) patients, per AASM rule 1B, hypopneas are scored by 30% with mild reduction in airflow for at least 10 seconds accompanied by arterial saturation decreased at 4%.  Study start time was 08:55:50 PM. Diagnostic recording time was 8h 49.5m with a total sleep time of 4h 55.5m resulting in a sleep efficiency of  55.81%%. Sleep latency from the start of the study was 54 minutes and the latency from sleep to REM was 112 minutes. In total,36 arousals were scored for an arousal index of 7.3.  Respiratory:  There were a total of 1 apneas consisting of 0 obstructive apneas, 0 mixed apneas, and 1 central apneas. A total of 28 hypopneas were scored. The apnea index was 0.20 per hour and the hypopnea index was 5.69 per hour resulting in an overall AHI of 5.89. AHI during REM was 17.8 and AHI while supine was 7.65.  Oximetry:  There was a mean oxygen saturation of 96.0%. The minimum oxygen saturation in NREM was 91.0 % and in REM was 93.0%. The patient spent 0.0 minutes of TST with SaO2 <88%.  Cardiac:  The highest heart rate seen while awake was 93 BPM while the highest heart rate during sleep was 97 BPM with an average sleeping heart rate of 76 BPM.  Limb Movements:  There were a total of 0 PLMs during sleep which resulted in a PLMS index of 0.0. Of these, 1 were associated with arousals which resulted in a PLMS arousal index of 0.2.    ASSESSMENT:    Mild sleep apnea hypopnea - overall AHI 5.9, supine AHI 7.6, mean event duration 26.0 seconds, longest event duration 68.4 seconds  No significant nocturnal desaturation - corey saturation 91% - saturations less than 88% for 0.0% of the TST  Minimal PLM's  Reduced sleep efficiency to 55.81% with only 1 non-REM REM cycle  Increased WASO: The patient awakened for approximately 2 and half hours during the middle of the test.  Failure to meet the split-night protocol secondary to an insufficient number qualifying respiratory events before 2 AM.            RECOMMENDATION:    If significant signs, symptoms, and or comorbidities warrant, recommend a positive airway pressure titration. Alternative treatments for OSAH include behavioral modification, use of a dental appliance, and nasopharyngeal reconstructive surgery. Behavior modification includes weight loss, eliminating alcohol and  sedatives, and avoiding the supine position. If alternative treatments are used, a follow-up diagnostic study is warranted to ensure efficacy.  Clinical correlation is needed.  An empiric challenge with auto titrating CPAP may be an acceptable option.            NOTES:     The AHI is the number of apneas (cessation of airflow for 10 seconds or longer) and hypopneas (shallow breaths accompanied by at least a 3% drop in the oxygen saturation) that occur per hour. Less than 5 per hour is normal, 5-15 per hour is mild sleep apnea, 15-30 per hour moderate sleep apnea, and greater than 30 per hour severe sleep apnea.    Patients with sleep apnea are at increased risk of cardiovascular disease including systemic arterial hypertension, pulmonary arterial hypertension, (transient or fixed), transient ischemic attacks, and an elevated risk of stroke, heart attack, sudden death, or arrhythmia between the hours of 11 PM and 6 AM.  Sleep apnea patients have an increased risk of motor vehicle accidents, type 2 diabetes, gastroesophageal reflux, repetitive mechanical trauma to pharyngeal muscles with inflammation and denervation, frequent EEG arousals leading to nonrestorative sleep, excessive daytime somnolence, fatigue, depression, poor pain control, irritability, and a lower quality of life.                Dr. Douglas Phelps MD'

## 2023-08-10 ENCOUNTER — HOSPITAL ENCOUNTER (EMERGENCY)
Facility: MEDICAL CENTER | Age: 43
End: 2023-08-10
Attending: STUDENT IN AN ORGANIZED HEALTH CARE EDUCATION/TRAINING PROGRAM
Payer: COMMERCIAL

## 2023-08-10 VITALS
BODY MASS INDEX: 25.19 KG/M2 | WEIGHT: 160.5 LBS | TEMPERATURE: 97.9 F | HEART RATE: 79 BPM | HEIGHT: 67 IN | SYSTOLIC BLOOD PRESSURE: 111 MMHG | RESPIRATION RATE: 17 BRPM | DIASTOLIC BLOOD PRESSURE: 71 MMHG | OXYGEN SATURATION: 98 %

## 2023-08-10 DIAGNOSIS — R21 RASH: ICD-10-CM

## 2023-08-10 PROCEDURE — 99282 EMERGENCY DEPT VISIT SF MDM: CPT

## 2023-08-10 RX ORDER — PREDNISONE 10 MG/1
40 TABLET ORAL DAILY
Qty: 20 TABLET | Refills: 0 | Status: SHIPPED | OUTPATIENT
Start: 2023-08-10 | End: 2023-08-15

## 2023-08-10 ASSESSMENT — FIBROSIS 4 INDEX: FIB4 SCORE: 0.58

## 2023-08-10 NOTE — Clinical Note
Tiarra Chavez was seen and treated in our emergency department on 8/10/2023.  She may return to work on 08/12/2023.       If you have any questions or concerns, please don't hesitate to call.      Corina Pascual M.D.

## 2023-08-11 NOTE — ED NOTES
Pt from lobby with steady gait. Agree with triage note. Chart up for ERP. Call light within reach.

## 2023-08-11 NOTE — ED PROVIDER NOTES
ED Provider Note    CHIEF COMPLAINT  Chief Complaint   Patient presents with    Rash     Since 1600       EXTERNAL RECORDS REVIEWED  Outpatient Notes Patient seen by her PCP for pernicious anemia and chronic fatigue 07/07/2023    HPI/ROS  LIMITATION TO HISTORY   Select: : None  OUTSIDE HISTORIAN(S):  Family  and son    Tiarra Chavez is a 43 y.o. female who presents with erythematous rash. She first noticed rash at work. 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 She noticed it on her waist line where her pants were sitting. It then spread to her abdomen, chest, neck, back and upper portion of her thighs. She went home and took some Benadryl. She decided to come to the ER for evaluation. She had no lip swelling, throat swelling, shortness of breath, fever, chills, mucosal involvement of rash or rash to palms or soles. She does have a history of breast cancer that has been treated and is on Tamoxifen. She denies any new exposures or new medications. She reports she has had a similar rash approximately one year ago and was seen in the ER and given steroids. Rash is already improving. She is allergic to shellfish, nickel and trazodone but does not believe she was exposed to this. She denies any recent travel, no recent hiking in wooded areas. There is no one around her with similar rash.     PAST MEDICAL HISTORY   has a past medical history of Allergic rhinitis (4/7/2011), Cancer ([facility]) (2020), Headache(784.0), Palpitations, Pernicious anemia, Snoring, and Urinary incontinence.    SURGICAL HISTORY   has a past surgical history that includes lumpectomy (2001); vaginal perineal exam repair (5/14/2012); other (Left, 1981); mastectomy, simple, complete (Bilateral, 3/26/2020); node biopsy sentinel (Right, 3/26/2020); breast biopsy (Right, 01/06/2020); and mass excision general (Left, 1/10/2022).    FAMILY HISTORY  Family History   Problem Relation Age of Onset    Hypertension Mother     Thyroid Mother         Hypothyroid     "Hyperlipidemia Father     Cancer Father         T cell lymphoma    Cancer Paternal Uncle         lymphoma    Heart Disease Maternal Grandfather         MI 38 yo, unknown RF.       SOCIAL HISTORY  Social History     Tobacco Use    Smoking status: Never    Smokeless tobacco: Never   Vaping Use    Vaping Use: Never used   Substance and Sexual Activity    Alcohol use: Never    Drug use: Not Currently     Types: Marijuana, Oral     Comment: couple times a week     Sexual activity: Yes     Partners: Male     Birth control/protection: Condom, Pill       CURRENT MEDICATIONS  Home Medications       Reviewed by Jaleesa Izquierdo R.N. (Registered Nurse) on 08/10/23 at 2123  Med List Status: Not Addressed     Medication Last Dose Status   buPROPion (WELLBUTRIN) 100 MG Tab  Active   CALCIUM-VITAMIN D PO  Active   Cyanocobalamin (VITAMIN B 12 PO)  Active   cyclobenzaprine (FLEXERIL) 10 mg Tab  Active   desvenlafaxine succinate (PRISTIQ) 50 MG TABLET SR 24 HR  Active   ergocalciferol (DRISDOL) 31478 UNIT capsule  Active   folic acid (FOLVITE) 1 MG TABS  Active   hydrocortisone (ANUSOL-HC) 25 MG Suppos  Active   lidocaine (LIDODERM) 5 % Patch  Active   Multiple Vitamin (MULTI-VITAMIN DAILY PO)  Active   ondansetron (ZOFRAN) 4 MG Tab tablet  Active   tamoxifen (NOLVADEX) 20 MG tablet  Active                    ALLERGIES  Allergies   Allergen Reactions    Shellfish Allergy Hives    Nickel Itching     rash    Trazodone        PHYSICAL EXAM  VITAL SIGNS: BP (!) 143/95   Pulse 83   Temp 36.3 °C (97.4 °F) (Temporal)   Resp 16   Ht 1.702 m (5' 7\")   Wt 72.8 kg (160 lb 7.9 oz)   SpO2 97%   BMI 25.14 kg/m²      Constitutional: Well developed, Well nourished, No acute respiratory distress, Non-toxic appearance.   HENT: Normocephalic, Atraumatic, Bilateral external ears normal, Oropharynx clear, mucous membranes are moist. No mucosal lesions. No oropharyngeal swelling or lip swelling.   Eyes: Conjunctiva normal, No discharge. No " icterus.  Neck: Normal range of motion. Supple.  Lymphatic: No cervical lymphadenopathy noted.   Cardiovascular: Normal heart rate, Normal rhythm, No murmurs, No rubs, No gallops.   Thorax & Lungs: Clear to auscultation bilaterally, No respiratory distress, No wheezing. Bilateral mastectomy changes noted with well healed surgical scars  Abdomen: Soft nontender normal bowel sounds no rebound masses or peritoneal signs  Skin: Warm, Dry, Blanching, erythematous rash to lower abdomen and bilateral upper extremities in humeral areas. Areas of rash that were previously on the neck and upper legs as well as chest have resolved. No petechiae. No sloughing of skin. No lesions to palms or soles.   Extremities: Intact distal pulses, No edema, No tenderness  Musculoskeletal: Good range of motion in all major joints. Normal gait.  Neurologic: Alert & oriented x 3. No focal deficits noted.   Psych: Alert normal affect       COURSE & MEDICAL DECISION MAKING    ED Observation Status? No; Patient does not meet criteria for ED Observation.     INITIAL ASSESSMENT, COURSE AND PLAN  Care Narrative: This is a 43 year old female who presents for evaluation of erythematous, blanching rash. On arrival her vital signs are stable. She is very well appearing. No history of fever, shortness of breath or any lip or throat swelling. On physical exam, rash is erythematous and blanching. No petechiae or sloughing of skin. No recent new medications to suggest drug rash. No recent travel. No involvement of the mucosal surfaces of her body or her palms or soles. Rash has already started to improve on my evaluation of her. Will give her a prescription for prednisone should her rash worsen again and also provide an EpiPen. She is to return to the ER if she uses her EpiPen. 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 She is going to keep track of any new potential new exposures in case this is allergic. She is instructed to follow  up with her PCP. Strict ER return precautions discussed. Patient agreeable to dc plan with no further questions.             DISPOSITION AND DISCUSSIONS  Patient discharged home in stable condition with instructions to follow up with PCP. Strict ER return precautions discussed. Patient agreeable to dc plan with no further questions.     FINAL DIAGNOSIS  1. Rash           Electronically signed by: Corina Pascual M.D., 8/10/2023 10:35 PM

## 2023-08-11 NOTE — DISCHARGE INSTRUCTIONS
You were seen in the ER for a rash. It seems to be improving fortunately. You can take Benadryl as needed. Return to the ER for any worsening rash, shortness of breath or any other concerns. I have written a prescription for prednisone that you can take as needed as well as Epipen.

## 2023-08-11 NOTE — ED TRIAGE NOTES
"  Chief Complaint   Patient presents with    Rash     Since 1600     Pt presents to triage for spontaneous red rash to the abd, back, neck and scattered over all 4 extremities. Denies pruritis or pain. Denies contact with any known allergen. Denies new cosmetic or hygiene products. Pt reports one past episode of same with unknown cause. Hx breast cancer with chemotherapy and radiation treatment. Pt denies SOB, chest tightness or airway swelling. Speaking in full sentences and -dysphonia.     Pt is alert/oriented, following commands, and answering questions appropriately. Pt placed in lobby and educated on triage process. Pt encouraged to alert staff for any changes in condition.    .BP (!) 143/95   Pulse 83   Temp 36.3 °C (97.4 °F) (Temporal)   Resp 16   Ht 1.702 m (5' 7\")   Wt 72.8 kg (160 lb 7.9 oz)   SpO2 97%   BMI 25.14 kg/m²       "

## 2023-08-16 ENCOUNTER — OFFICE VISIT (OUTPATIENT)
Dept: SLEEP MEDICINE | Facility: MEDICAL CENTER | Age: 43
End: 2023-08-16
Attending: PHYSICIAN ASSISTANT
Payer: COMMERCIAL

## 2023-08-16 VITALS
OXYGEN SATURATION: 97 % | WEIGHT: 160 LBS | BODY MASS INDEX: 25.11 KG/M2 | SYSTOLIC BLOOD PRESSURE: 120 MMHG | DIASTOLIC BLOOD PRESSURE: 80 MMHG | HEART RATE: 74 BPM | HEIGHT: 67 IN | RESPIRATION RATE: 16 BRPM

## 2023-08-16 DIAGNOSIS — G47.10 HYPERSOMNOLENCE DISORDER, PERSISTENT: ICD-10-CM

## 2023-08-16 DIAGNOSIS — G47.33 OSA (OBSTRUCTIVE SLEEP APNEA): ICD-10-CM

## 2023-08-16 PROCEDURE — 99213 OFFICE O/P EST LOW 20 MIN: CPT | Performed by: PHYSICIAN ASSISTANT

## 2023-08-16 PROCEDURE — 3074F SYST BP LT 130 MM HG: CPT | Performed by: PHYSICIAN ASSISTANT

## 2023-08-16 PROCEDURE — 3079F DIAST BP 80-89 MM HG: CPT | Performed by: PHYSICIAN ASSISTANT

## 2023-08-16 ASSESSMENT — ENCOUNTER SYMPTOMS
PALPITATIONS: 0
WHEEZING: 0
SORE THROAT: 0
TREMORS: 0
ROS GI COMMENTS: NO DENTURES, NO DIFFICULTY SWALLOWING
SHORTNESS OF BREATH: 0
SINUS PAIN: 0
ORTHOPNEA: 0
INSOMNIA: 1
FEVER: 0
HEADACHES: 1
WEIGHT LOSS: 0
DIZZINESS: 1
HEARTBURN: 0
SPUTUM PRODUCTION: 1
COUGH: 1
CHILLS: 0

## 2023-08-16 ASSESSMENT — FIBROSIS 4 INDEX: FIB4 SCORE: 0.58

## 2023-08-16 NOTE — PROGRESS NOTES
"Chief Complaint   Patient presents with    Follow-Up     06/21/23 Ineck    Results     SS       HPI:  Tiarra Chavez is a 43 y.o. year old female here today for follow-up on hypersomnolence, obstructive sleep apnea and sleep study results.  Patient last seen in clinic 6/21/2023.    Patient initially diagnosed with mild to moderate sleep apnea, patient was experiencing increased symptoms and completed an overnight home sleep test more recently which did not demonstrate sleep apnea despite her increase in symptoms although home sleep test frequently under estimate apnea especially in women.  She did stop using her CPAP device pending this results appointment.  She is experiencing significant decrease in quality of life secondary to severe fatigue with inability to participate in her normal activity/hobbies and ongoing struggle as a single mom.    Past Medical History: Palpitations, generalized anxiety, pernicious anemia, right-sided breast cancer in 2020.  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       Vitals:  /80   Pulse 74   Resp 16   Ht 1.702 m (5' 7\")   Wt 72.6 kg (160 lb)   SpO2 97%     Recent Imaging: Including echocardiogram obtained 11/5/2020 demonstrated normal left ventricular chamber size, wall thickness, systolic and diastolic function.  LVEF estimated 65%.  Normal right ventricular size and systolic function, trace tricuspid regurgitation with estimated RVSP of 25 mmHg.    Currently using  Resmed auto CPAP @5-15 cm H20 pressure; patient has not been using more recently pending follow-up on updated in lab sleep study.  Device obtained April 2022  DME provider [facility]  Mask interface ResMed AirFit F30 I    Home sleep study performed 1/6/2022 on a ResMed apnea link device demonstrated low O2 sat of 82%, average sat 893%.  With sats less than or equal to 88% for 8 minutes.  Snoring episodes recorded at 438.  Per pulmonologist interpretation study consistent with mild to moderate obstructive sleep " "apnea, GINA of 13.2.     Home sleep test WatchPAT obtained 6/6/2023 demonstrated per pulmonologist report no significant CASSIE H with pAHI of 3.8/h, 4.4/h supine.  Low O2 sats 91% with baseline 95%.  Snoring reported as exceeding 45 dB or so for 11.8 minutes. Patient reports not sleeping well for the study.     Polysomnogram study obtained 7/10/2023 demonstrated mild sleep apnea of 5.89/h increasing to 17.8/h during REM sleep and 7.65/h supine.  Patient with low O2 sat of 91% with 0 minutes less than or equal to 88%.  Patient denied need to split-night criteria secondary to reduced sleep efficiency with only 1 non-REM REM cycle and awake time period of 2-1/2 hours during the middle of the night.    Sleep schedule reports going to bed between 5 and 10 PM but usually asleep at 8 PM and wakes up around 530 previously was 7 AM.  She usually wakes up about 12:30 AM and is awake until 2:30 AM or so.    Symptoms experiences daytime somnolence , moderate to severe fatigue.    [place] Sleepiness Scale Total Score: 18 (8/16/2023  9:11 AM)   Stop Bang Score 2 (8/16/2023  9:13 AM)         Review of Systems   Constitutional:  Positive for malaise/fatigue (severe). Negative for chills, fever and weight loss.        Hot/cold sensitive   HENT:  Positive for congestion (mild). Negative for hearing loss, nosebleeds, sinus pain, sore throat and tinnitus.    Eyes:         Presc glasses   Respiratory:  Positive for cough (am) and sputum production (clear). Negative for shortness of breath and wheezing.    Cardiovascular:  Negative for chest pain, palpitations, orthopnea and leg swelling.   Gastrointestinal:  Negative for heartburn.        No dentures, no difficulty swallowing    Neurological:  Positive for dizziness (\"falling\" inside) and headaches. Negative for tremors.   Psychiatric/Behavioral:  The patient has insomnia.        Past Medical History:   Diagnosis Date    Allergic rhinitis 4/7/2011    Cancer (HCC) 2020    breast right    " "Headache(784.0)     When in school, not a problem now.  With sleep deprivation.    Palpitations     Pernicious anemia     Snoring     Urinary incontinence     minor       Past Surgical History:   Procedure Laterality Date    MASS EXCISION GENERAL Left 1/10/2022    Procedure: EXCISION, MASS - CHEST WALL;  Surgeon: Sophy Holbrook M.D.;  Location: SURGERY [facility];  Service: General    PB MASTECTOMY, SIMPLE, COMPLETE Bilateral 3/26/2020    Procedure: MASTECTOMY;  Surgeon: Sophy Holbrook M.D.;  Location: SURGERY [facility];  Service: General    NODE BIOPSY SENTINEL Right 3/26/2020    Procedure: BIOPSY, LYMPH NODE, SENTINEL;  Surgeon: Sophy Holbrook M.D.;  Location: SURGERY [facility];  Service: General    BREAST BIOPSY Right 01/06/2020    Rt Breast Bx    VAGINAL PERINEAL EXAM REPAIR  5/14/2012    Performed by HUSSAIN RANGEL at LABOR AND DELIVERY    LUMPECTOMY  2001    [facility]    OTHER Left 1981    laceration repair  left leg       Family History   Problem Relation Age of Onset    Hypertension Mother     Thyroid Mother         Hypothyroid    Hyperlipidemia Father     Cancer Father         T cell lymphoma    Cancer Paternal Uncle         lymphoma    Heart Disease Maternal Grandfather         MI 40 yo, unknown RF.       Social History     Socioeconomic History    Marital status: Single     Spouse name: Not on file    Number of children: Not on file    Years of education: Not on file    Highest education level: Not on file   Occupational History     Comment: pharmacict   Tobacco Use    Smoking status: Never    Smokeless tobacco: Never   Vaping Use    Vaping Use: Never used   Substance and Sexual Activity    Alcohol use: Never    Drug use: Not Currently     Types: Marijuana, Oral     Comment: couple times a week     Sexual activity: Yes     Partners: Male     Birth control/protection: Condom, Pill   Other Topics Concern    Not on file   Social History Narrative    Not on file     Social " Determinants of Health     Financial Resource Strain: Not on file   Food Insecurity: Not on file   Transportation Needs: Not on file   Physical Activity: Not on file   Stress: Not on file   Social Connections: Not on file   Intimate Partner Violence: Not on file   Housing Stability: Not on file       Allergies as of 08/16/2023 - Reviewed 08/16/2023   Allergen Reaction Noted    Shellfish allergy Hives 03/15/2010    Nickel Itching 03/24/2020    Trazodone  09/26/2022          Current medications as of today   Current Outpatient Medications   Medication Sig Dispense Refill    EPINEPHrine 0.3 MG/0.3ML Solution Prefilled Syringe Inject the contents of the epipen into the thigh, hold for 3 seconds and release from thigh as needed for anaphylaxis. 1 Each 0    desvenlafaxine succinate (PRISTIQ) 50 MG TABLET SR 24 HR Take 50 mg by mouth every day.      buPROPion (WELLBUTRIN) 100 MG Tab Take 100 mg by mouth 2 times a day.      lidocaine (LIDODERM) 5 % Patch APPLY 1 PATCH TOPICALLY TO CLEAN, DRY SKIN. LEAVE ON FOR 24 HOURS THEN REMOVE.  MUST WAIT AT LEAST 12 HOURS BEFORE APPLYING PATCH(ES) AGAIN.      ondansetron (ZOFRAN) 4 MG Tab tablet Take 4 mg by mouth 3 times a day as needed.      cyclobenzaprine (FLEXERIL) 10 mg Tab cyclobenzaprine 10 mg tablet   TAKE 1 TABLET BY MOUTH IN THE EVENING      CALCIUM-VITAMIN D PO Take 500 mg by mouth every day.      hydrocortisone (ANUSOL-HC) 25 MG Suppos Anucort-HC 25 mg suppository  INSERT 1 SUPPOSITORY RECTALLY ONCE DAILY AS NEEDED 30 Suppository 0    tamoxifen (NOLVADEX) 20 MG tablet Take 20 mg by mouth every 48 hours.      Multiple Vitamin (MULTI-VITAMIN DAILY PO) Take 1 Tablet by mouth every day.      Cyanocobalamin (VITAMIN B 12 PO) Take 1 Tab by mouth every day.      folic acid (FOLVITE) 1 MG TABS Take 1 mg by mouth every day.      ergocalciferol (DRISDOL) 45535 UNIT capsule Take 50,000 Units by mouth see administration instructions. On Sunday and Wed       No current  facility-administered medications for this visit.         Physical Exam:   Gen:           Alert and oriented, No apparent distress. Mood and affect appropriate, normal interaction with examiner.   Hearing:     Grossly intact.  Nose:          Normal, no lesions or deformities.  Dentition:    Good dentition.   Oropharynx:   Tongue normal, posterior pharynx without erythema or exudate.  Mallampati Classification: II  Neck:        Supple, trachea midline, no masses.  Respiratory Effort: No intercostal retractions or use of accessory muscles.   Gait and Station: Normal.  Digits and Nails: No clubbing, cyanosis, petechiae, or nodes.   Skin:        No rashes, lesions or ulcers noted.               Ext:           No cyanosis or edema.      Immunizations:  Flu: 8/1/22  PCV 20: 8/1/2022  Pfizer booster SARS CoV2 Vaccine: 9/15/2022  Moderna SARS Cov 2 vaccine: 8/15/2021, 2/5/2021, 1/8/2021    Assessment / Plan:  1. Hypersomnolence disorder, persistent  - Solriamfetol HCl 75 MG Tab; Take 75 mg by mouth every day at 6 PM for 14 days. Indications: Obstructive Sleep Apnea Syndrome  Dispense: 14 Tablet     Patient is understandably frustrated by continued symptoms.  Support given.  Patient did previously trial Provigil but did not tolerate secondary to vision issues, patient will trial Sunosi and 14-day sample was provided.  Patient will message me with tolerance/benefit.  Importance of continued CPAP use reviewed.     2. CASSIE (obstructive sleep apnea)    Mild to moderate sleep apnea with disproportionate fatigue level, patient does wear a mouthguard at night, frequently awakens for 2 to 2-1/2 hours secondary to difficulty falling back to sleep.  Reviewed with Dr. Nixon.  Reviewed in lab titration study, patient will resume CPAP use.       Follow-up:   Return in about 3 months (around 11/16/2023) for Return with Paulina Chao PA-C.    Please note that this dictation was created using voice recognition software. I have made every  reasonable attempt to correct obvious errors, but it is possible there are errors of grammar and possibly content that I did not discover before finalizing the note.

## 2023-08-28 ENCOUNTER — PATIENT MESSAGE (OUTPATIENT)
Dept: SLEEP MEDICINE | Facility: MEDICAL CENTER | Age: 43
End: 2023-08-28
Payer: COMMERCIAL

## 2023-08-28 DIAGNOSIS — G47.10 HYPERSOMNOLENCE: ICD-10-CM

## 2023-11-21 ENCOUNTER — OFFICE VISIT (OUTPATIENT)
Dept: SLEEP MEDICINE | Facility: MEDICAL CENTER | Age: 43
End: 2023-11-21
Attending: PHYSICIAN ASSISTANT
Payer: COMMERCIAL

## 2023-11-21 VITALS
HEIGHT: 67 IN | WEIGHT: 165 LBS | HEART RATE: 85 BPM | SYSTOLIC BLOOD PRESSURE: 120 MMHG | RESPIRATION RATE: 16 BRPM | OXYGEN SATURATION: 97 % | BODY MASS INDEX: 25.9 KG/M2 | DIASTOLIC BLOOD PRESSURE: 70 MMHG

## 2023-11-21 DIAGNOSIS — G47.33 OSA (OBSTRUCTIVE SLEEP APNEA): ICD-10-CM

## 2023-11-21 DIAGNOSIS — G47.10 HYPERSOMNOLENCE DISORDER, PERSISTENT: ICD-10-CM

## 2023-11-21 DIAGNOSIS — G47.19 EXCESSIVE DAYTIME SLEEPINESS: ICD-10-CM

## 2023-11-21 PROCEDURE — 3074F SYST BP LT 130 MM HG: CPT | Performed by: PHYSICIAN ASSISTANT

## 2023-11-21 PROCEDURE — 99212 OFFICE O/P EST SF 10 MIN: CPT | Performed by: PHYSICIAN ASSISTANT

## 2023-11-21 PROCEDURE — 3078F DIAST BP <80 MM HG: CPT | Performed by: PHYSICIAN ASSISTANT

## 2023-11-21 PROCEDURE — 99213 OFFICE O/P EST LOW 20 MIN: CPT | Performed by: PHYSICIAN ASSISTANT

## 2023-11-21 RX ORDER — SOLRIAMFETOL 75 MG/1
75 TABLET, FILM COATED ORAL DAILY
Qty: 30 TABLET | Refills: 0 | Status: SHIPPED
Start: 2023-11-21 | End: 2023-12-21

## 2023-11-21 ASSESSMENT — ENCOUNTER SYMPTOMS
COUGH: 1
SINUS PAIN: 0
ORTHOPNEA: 0
SPUTUM PRODUCTION: 1
HEADACHES: 0
HEARTBURN: 1
CHILLS: 0
PALPITATIONS: 0
SHORTNESS OF BREATH: 0
WHEEZING: 0
TREMORS: 0
INSOMNIA: 0
SORE THROAT: 0
DIZZINESS: 0
WEIGHT LOSS: 0
FEVER: 0

## 2023-11-21 ASSESSMENT — FIBROSIS 4 INDEX: FIB4 SCORE: 0.58

## 2023-11-21 NOTE — PATIENT INSTRUCTIONS
1-continued efforts to use cpap (29/30 days)   2-is removing mask during night  3-discussion regarding strategies to address this issue including mask change  4-mask and supply order placed  5-samples provided for Sunosi (failed previous trial Provigil)  6-appeal authorization started  7-follow up in 3 months

## 2023-11-21 NOTE — PROGRESS NOTES
"Chief Complaint   Patient presents with    Apnea    Follow-Up     08/16/23 Jeremie       HPI:  Tiarra Chavez is a 43 y.o. year old female here today for follow-up on obstructive sleep apnea, hypersomnolence and excessive daytime sleepiness.  Last seen in clinic 8/16/2023 by Paulina Chao PA-C.    Past Medical History: Generalized anxiety, pernicious anemia, palpitations, right-sided breast cancer 2020.  Significant decrease in quality of life secondary to her severe fatigue with inability to participate in normal activities/hobbies and ongoing struggle to care for herself and family as a single mom.  Support given.  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  Vitals:  /70   Pulse 85   Resp 16   Ht 1.702 m (5' 7\")   Wt 74.8 kg (165 lb)   SpO2 97%     Recent Imaging:  echocardiogram obtained 11/5/2020 demonstrated normal left ventricular chamber size, wall thickness, systolic and diastolic function.  LVEF estimated 65%.  Normal right ventricular size and systolic function, trace tricuspid regurgitation with estimated RVSP of 25 mmHg.     Currently using  Resmed auto CPAP @ 5-15 cm H20 pressure; compliance reviewed for 10- thru 11-, days used 29/30 or 97%, average daily usage 4 hours, 43% of days greater than or equal to 4 hours, mask leak at  6.4 LPM at 95th percentile, AHI 0.9 per hour.    Device obtained April 2022  DME provider [facility]  Mask interface hybrid fullface mask (ResMed air fit F30 I)    Home sleep study performed 1/6/2022 on a ResMed apnea link device demonstrated low O2 sat of 82%, average sat 893%.  With sats less than or equal to 88% for 8 minutes.  Snoring episodes recorded at 438.  Per pulmonologist interpretation study consistent with mild to moderate obstructive sleep apnea, GINA of 13.2.     Home sleep test WatchPAT obtained 6/6/2023 demonstrated per pulmonologist report no significant CASSIE H with pAHI of 3.8/h, 4.4/h supine.  Low O2 sats 91% with baseline 95%.  Snoring reported " as exceeding 45 dB or so for 11.8 minutes. Patient reports not sleeping well for the study.     Polysomnogram study obtained 7/10/2023 demonstrated mild sleep apnea of 5.89/h increasing to 17.8/h during REM sleep and 7.65/h supine.  Patient with low O2 sat of 91% with 0 minutes less than or equal to 88%.  Patient denied need to split-night criteria secondary to reduced sleep efficiency with only 1 non-REM REM cycle and awake time period of 2-1/2 hours during the middle of the night.    Sleep schedule goes to bed 5 pm, wakens 5-7 am , and gets up during the night from 3-5 am but stays in bed and will usually go back to sleep after couple hours until 5-7 AM.  She reports sleeping on her side.  Symptoms  requires large amts of caffiene to make it through her work day, naps frequently, extended sleep hours.   Does not feel her sleep is restorative.    [place] Sleepiness Scale Total Score: 18 (8/16/2023  9:11 AM)   Stop Bang Score 2 (8/16/2023  9:13 AM)         Review of Systems   Constitutional:  Positive for malaise/fatigue (Moderate-severe). Negative for chills, fever and weight loss.   HENT:  Positive for congestion. Negative for hearing loss, nosebleeds, sinus pain, sore throat and tinnitus.    Eyes:         Presc glasses   Respiratory:  Positive for cough and sputum production (cloudy). Negative for shortness of breath and wheezing.    Cardiovascular:  Positive for chest pain. Negative for palpitations, orthopnea and leg swelling.   Gastrointestinal:  Positive for heartburn (mild occasional).        No dentures, no missing, no difficulty swallowing    Neurological:  Negative for dizziness, tremors and headaches.   Psychiatric/Behavioral:  The patient does not have insomnia.        Past Medical History:   Diagnosis Date    Allergic rhinitis 4/7/2011    Cancer (HCC) 2020    breast right    Headache(784.0)     When in school, not a problem now.  With sleep deprivation.    Palpitations     Pernicious anemia     Snoring  "    Urinary incontinence     minor       Past Surgical History:   Procedure Laterality Date    MASS EXCISION GENERAL Left 1/10/2022    Procedure: EXCISION, MASS - CHEST WALL;  Surgeon: Sophy Holbrook M.D.;  Location: SURGERY [facility];  Service: General    PB MASTECTOMY, SIMPLE, COMPLETE Bilateral 3/26/2020    Procedure: MASTECTOMY;  Surgeon: Sophy Holbrook M.D.;  Location: SURGERY [facility];  Service: General    NODE BIOPSY SENTINEL Right 3/26/2020    Procedure: BIOPSY, LYMPH NODE, SENTINEL;  Surgeon: Sophy Holbrook M.D.;  Location: SURGERY [facility];  Service: General    BREAST BIOPSY Right 01/06/2020    Rt Breast Bx    VAGINAL PERINEAL EXAM REPAIR  5/14/2012    Performed by HUSSAIN RANGEL at LABOR AND DELIVERY    LUMPECTOMY  2001    [facility]    OTHER Left 1981    laceration repair  left leg       Family History   Problem Relation Age of Onset    Hypertension Mother     Thyroid Mother         Hypothyroid    Hyperlipidemia Father     Cancer Father         T cell lymphoma    Cancer Paternal Uncle         lymphoma    Heart Disease Maternal Grandfather         MI 40 yo, unknown RF.       Social History     Socioeconomic History    Marital status: Single     Spouse name: Not on file    Number of children: Not on file    Years of education: Not on file    Highest education level: Not on file   Occupational History     Comment: pharmacict   Tobacco Use    Smoking status: Never    Smokeless tobacco: Never   Vaping Use    Vaping Use: Never used   Substance and Sexual Activity    Alcohol use: Never    Drug use: Not Currently     Types: Marijuana, Oral     Comment: couple times a week     Sexual activity: Yes     Partners: Male     Birth control/protection: Condom, Pill   Other Topics Concern    Not on file   Social History Narrative    Not on file     Social Determinants of Health     Financial Resource Strain: Not on file   Food Insecurity: Not on file   Transportation Needs: Not on file "   Physical Activity: Not on file   Stress: Not on file   Social Connections: Not on file   Intimate Partner Violence: Not on file   Housing Stability: Not on file       Allergies as of 11/21/2023 - Reviewed 11/21/2023   Allergen Reaction Noted    Shellfish allergy Hives 03/15/2010    Nickel Itching 03/24/2020    Trazodone  09/26/2022          Current medications as of today   Current Outpatient Medications   Medication Sig Dispense Refill    EPINEPHrine 0.3 MG/0.3ML Solution Prefilled Syringe Inject the contents of the epipen into the thigh, hold for 3 seconds and release from thigh as needed for anaphylaxis. 1 Each 0    desvenlafaxine succinate (PRISTIQ) 50 MG TABLET SR 24 HR Take 50 mg by mouth every day.      buPROPion (WELLBUTRIN) 100 MG Tab Take 100 mg by mouth 2 times a day.      lidocaine (LIDODERM) 5 % Patch APPLY 1 PATCH TOPICALLY TO CLEAN, DRY SKIN. LEAVE ON FOR 24 HOURS THEN REMOVE.  MUST WAIT AT LEAST 12 HOURS BEFORE APPLYING PATCH(ES) AGAIN.      ondansetron (ZOFRAN) 4 MG Tab tablet Take 4 mg by mouth 3 times a day as needed.      cyclobenzaprine (FLEXERIL) 10 mg Tab cyclobenzaprine 10 mg tablet   TAKE 1 TABLET BY MOUTH IN THE EVENING      CALCIUM-VITAMIN D PO Take 500 mg by mouth every day.      hydrocortisone (ANUSOL-HC) 25 MG Suppos Anucort-HC 25 mg suppository  INSERT 1 SUPPOSITORY RECTALLY ONCE DAILY AS NEEDED 30 Suppository 0    tamoxifen (NOLVADEX) 20 MG tablet Take 20 mg by mouth every 48 hours.      Multiple Vitamin (MULTI-VITAMIN DAILY PO) Take 1 Tablet by mouth every day.      Cyanocobalamin (VITAMIN B 12 PO) Take 1 Tab by mouth every day.      folic acid (FOLVITE) 1 MG TABS Take 1 mg by mouth every day.       No current facility-administered medications for this visit.         Physical Exam:   Gen:           Alert and oriented, No apparent distress. Mood and affect appropriate, normal interaction with examiner.   Hearing:     Grossly intact.  Nose:          Normal, no lesions or  deformities.  Dentition:    Good dentition.   Oropharynx:   Tongue normal, posterior pharynx without erythema or exudate.  Mallampati Classification: II  Neck:        Supple, trachea midline, no masses.  Respiratory Effort: No intercostal retractions or use of accessory muscles.   Gait and Station: Normal.  Digits and Nails: No clubbing, cyanosis, petechiae, or nodes.   Skin:        No rashes, lesions or ulcers noted.               Ext:           No cyanosis or edema.      Immunizations:  Flu: 8/1/2022    PCV 20: 8/1/2022  Pfizer booster SARS CoV2 Vaccine: 9/15/2022  Moderna SARS-CoV-2 vaccine: 8/15/2021, 2/5/2021, 1/8/2021    Assessment / Plan:  1. Hypersomnolence disorder, persistent  - Solriamfetol HCl (SUNOSI) 75 MG Tab; Take 75 mg by mouth every day for 30 days.  Dispense: 30 Tablet; Refill: 0  - Solriamfetol HCl 150 MG Tab; Take 150 mg by mouth every day for 30 days.  Dispense: 30 Tablet; Refill: 0    Patient was denied medication, appeal for prior authorization initiated.  Samples provided of Sunosi.  Failed previous trial of Provigil.  Short-term follow-up to reassess.    2. Excessive daytime sleepiness    3. CASSEI (obstructive sleep apnea)  - DME Mask and Supplies    Patient with continued efforts to use CPAP on a daily basis, she does remove mask during the night, discussion regarding strategies to address this issue including mask changed to make it more difficult to remove inadvertently.  Mask and supply order placed.  Reminded to use only distilled water and humidifier chamber.  Patient cleaning mask and supplies as well as changing supplies on appropriate basis.        Follow-up:   Return in about 3 months (around 2/21/2024) for Return with Paulina Chao PA-C.    Please note that this dictation was created using voice recognition software. I have made every reasonable attempt to correct obvious errors, but it is possible there are errors of grammar and possibly content that I did not discover before  finalizing the note.

## 2024-01-19 ENCOUNTER — PATIENT MESSAGE (OUTPATIENT)
Dept: SLEEP MEDICINE | Facility: MEDICAL CENTER | Age: 44
End: 2024-01-19
Payer: COMMERCIAL

## 2024-01-19 DIAGNOSIS — R40.0 DAYTIME SOMNOLENCE: ICD-10-CM

## 2024-01-19 NOTE — PATIENT COMMUNICATION
Have we ever prescribed this med? Yes.  If yes, what date?  11/21/2023    Last OV: 11/21/2023 - Paulina Chao P.A-C     Next OV: 2/27/2024- Paulina Chao P.A-C     DX: Hypersomnolence disorder, persistent [G47.10]     Medications: Solriamfetol HCl (SUNOSI) 75 MG Tab

## 2024-02-27 ENCOUNTER — OFFICE VISIT (OUTPATIENT)
Dept: SLEEP MEDICINE | Facility: MEDICAL CENTER | Age: 44
End: 2024-02-27
Attending: PHYSICIAN ASSISTANT
Payer: COMMERCIAL

## 2024-02-27 VITALS
DIASTOLIC BLOOD PRESSURE: 74 MMHG | HEART RATE: 91 BPM | SYSTOLIC BLOOD PRESSURE: 108 MMHG | HEIGHT: 67 IN | BODY MASS INDEX: 25.9 KG/M2 | WEIGHT: 165 LBS | OXYGEN SATURATION: 95 %

## 2024-02-27 DIAGNOSIS — G47.33 OSA (OBSTRUCTIVE SLEEP APNEA): ICD-10-CM

## 2024-02-27 DIAGNOSIS — G47.10 HYPERSOMNOLENCE DISORDER: ICD-10-CM

## 2024-02-27 PROCEDURE — 3078F DIAST BP <80 MM HG: CPT | Performed by: PHYSICIAN ASSISTANT

## 2024-02-27 PROCEDURE — 99213 OFFICE O/P EST LOW 20 MIN: CPT | Performed by: PHYSICIAN ASSISTANT

## 2024-02-27 PROCEDURE — 3074F SYST BP LT 130 MM HG: CPT | Performed by: PHYSICIAN ASSISTANT

## 2024-02-27 ASSESSMENT — FIBROSIS 4 INDEX: FIB4 SCORE: 0.58

## 2024-02-27 ASSESSMENT — ENCOUNTER SYMPTOMS
SHORTNESS OF BREATH: 0
TREMORS: 0
COUGH: 1
INSOMNIA: 0
SORE THROAT: 0
PALPITATIONS: 0
SPUTUM PRODUCTION: 0
HEADACHES: 0
CHILLS: 0
WEIGHT LOSS: 0
SINUS PAIN: 0
FEVER: 0
WHEEZING: 0
HEARTBURN: 0
DIZZINESS: 0
ORTHOPNEA: 0

## 2024-02-27 ASSESSMENT — PATIENT HEALTH QUESTIONNAIRE - PHQ9
5. POOR APPETITE OR OVEREATING: 1 - SEVERAL DAYS
CLINICAL INTERPRETATION OF PHQ2 SCORE: 2
SUM OF ALL RESPONSES TO PHQ QUESTIONS 1-9: 9

## 2024-02-27 NOTE — PATIENT INSTRUCTIONS
1-definite improvement on Sunosi at 150 mg but mild side effects  2-may need to decrease dosing if increases/persists  3-reviewed compliance which is improved as well  4-refill Sunosi provided and plan discussed  5-refill mask and supplies  6-Today we reviewed equipment cleaning  once weekly minimum  mask, tubing and water chamber  use dedicated container  use mild soap and water  SoClean or other ozone  are not recommended  white vinegar and water solution is no longer recommended  hang tubing to dry  mask sanitizing wipes are an option for use   7-As a reminder use distilled water only in humidifier chamber.    8-Equipment replacement schedule : Mask cushion every month, Mask every 6 months, Head gear every 6 months, Tubing every 6 months, Ultra-fine filters 2 times per month, Humidifier chamber every 6 months   9-follow up in 6 months, sooner if needed

## 2024-02-27 NOTE — PROGRESS NOTES
"Chief Complaint   Patient presents with    Follow-Up     CASSIE. Last seen 11/21/23       HPI:  Tiarra Chavez is a 43 y.o. year old female here today for follow-up on obstructive sleep apnea and hypersomnolence and excessive daytime sleepiness .  Patient last seen in clinic 11/21/2023 by Paulina Chao PA-C.  Previously evaluated by Dr. Douglas Phelps 5/22/2023.    Past Medical History: Generalized anxiety, pernicious anemia, palpitations, right-sided breast cancer 2020.  Was experiencing significant decrease in quality of life secondary to her severe fatigue with inability to participate in normal activities/hobbies and ongoing struggle to care for herself and family as a single mom.  Patient is experiencing marked improvement in symptoms with mild side effects including a small amount of weight gain which we reviewed.  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     Vitals:  /74 (BP Location: Left arm, Patient Position: Sitting, BP Cuff Size: Adult)   Pulse 91   Ht 1.702 m (5' 7\")   Wt 74.8 kg (165 lb)   SpO2 95% BMI of 25.84 kg/m².    Recent Imaging: None    Echocardiogram obtained 11/5/2020 demonstrated normal left ventricular chamber size, wall thickness, systolic and diastolic function.  LVEF estimated 65%.  Normal right ventricular size and systolic function, trace tricuspid regurgitation with estimated RVSP of 25 mmHg.      Currently using  Resmed auto CPAP @5-15 cm H20 pressure; compliance reviewed for 1/29/2024 through 2/27/2024, days used 30/30, average daily usage 4 hours 55 minutes, 100% of days greater than or equal to 4 hours, mask leak at 19.4 LPM at 95th percentile, AHI 1.4 per hour.  Copy provided to patient at her request.    Device obtained April 2022  DME provider [facility] [place]  Mask interface hybrid fullface mask    Home sleep study performed 1/6/2022 on a ResMed apnea link device demonstrated low O2 sat of 82%, average sat 893%.  With sats less than or equal to 88% for 8 minutes.  Snoring episodes " recorded at 438.  Per pulmonologist interpretation study consistent with mild to moderate obstructive sleep apnea, GINA of 13.2.     Home sleep test WatchPAT obtained 6/6/2023 demonstrated per pulmonologist report no significant CASSIE H with pAHI of 3.8/h, 4.4/h supine.  Low O2 sats 91% with baseline 95%.  Snoring reported as exceeding 45 dB or so for 11.8 minutes. Patient reports not sleeping well for the study.     Polysomnogram study obtained 7/10/2023 demonstrated mild sleep apnea of 5.89/h increasing to 17.8/h during REM sleep and 7.65/h supine.  Patient with low O2 sat of 91% with 0 minutes less than or equal to 88%.  Patient denied need to split-night criteria secondary to reduced sleep efficiency with only 1 non-REM REM cycle and awake time period of 2-1/2 hours during the middle of the night.      Sleep schedule goes to bed 9 PM and wakens 5-6 AM.  May wake up during the night but able to go back to sleep, may have some initial delay in falling asleep.   Symptoms denies day time somnolence, denies morning headache, and does not nap but may occasionally feel sleepy in the afternoons.  Reports her sleep is feeling more restorative.    [place] Sleepiness Scale reported as 18/24 on 8/16/2023.  Stop Bang Score 2 (8/16/2023  9:13 AM)         Review of Systems   Constitutional:  Positive for malaise/fatigue (improved). Negative for chills, fever and weight loss.   HENT:  Positive for congestion (mild from cpap). Negative for hearing loss, nosebleeds, sinus pain, sore throat and tinnitus.    Eyes:         Presc glasses, some intermittent eye twitching    Respiratory:  Positive for cough (URI). Negative for sputum production, shortness of breath and wheezing.    Cardiovascular:  Negative for chest pain, palpitations, orthopnea and leg swelling.   Gastrointestinal:  Negative for heartburn.        No dentures, no missing teeth, no difficulty swallowing, uses mouth guard    Neurological:  Negative for dizziness, tremors  "and headaches.   Psychiatric/Behavioral:  The patient does not have insomnia (resolved).        Past Medical History:   Diagnosis Date    Allergic rhinitis 4/7/2011    Cancer (HCC) 2020    breast right    Headache(784.0)     When in school, not a problem now.  With sleep deprivation.    Palpitations     Pernicious anemia     Snoring     Urinary incontinence     minor       Past Surgical History:   Procedure Laterality Date    MASS EXCISION GENERAL Left 1/10/2022    Procedure: EXCISION, MASS - CHEST WALL;  Surgeon: Sophy Holbrook M.D.;  Location: SURGERY [facility];  Service: General    PB MASTECTOMY, SIMPLE, COMPLETE Bilateral 3/26/2020    Procedure: MASTECTOMY;  Surgeon: Sophy Holbrook M.D.;  Location: SURGERY [facility];  Service: General    NODE BIOPSY SENTINEL Right 3/26/2020    Procedure: BIOPSY, LYMPH NODE, SENTINEL;  Surgeon: Sophy Holbrook M.D.;  Location: SURGERY [facility];  Service: General    BREAST BIOPSY Right 01/06/2020    Rt Breast Bx    VAGINAL PERINEAL EXAM REPAIR  5/14/2012    Performed by HUSSAIN RANGEL at LABOR AND DELIVERY    LUMPECTOMY  2001    [facility]    OTHER Left 1981    laceration repair  left leg       Family History   Problem Relation Age of Onset    Hypertension Mother     Thyroid Mother         Hypothyroid    Hyperlipidemia Father     Cancer Father         T cell lymphoma    Cancer Paternal Uncle         lymphoma    Heart Disease Maternal Grandfather         MI 38 yo, unknown RF.       Social History     Socioeconomic History    Marital status: Single     Spouse name: Not on file    Number of children: Not on file    Years of education: Not on file    Highest education level: Not on file   Occupational History     Comment: pharmacict   Tobacco Use    Smoking status: Never    Smokeless tobacco: Never   Vaping Use    Vaping Use: Never used   Substance and Sexual Activity    Alcohol use: Never    Drug use: Not Currently     Types: Marijuana, Oral     Comment: " couple times a week     Sexual activity: Yes     Partners: Male     Birth control/protection: Condom, Pill   Other Topics Concern    Not on file   Social History Narrative    Not on file     Social Determinants of Health     Financial Resource Strain: Not on file   Food Insecurity: Not on file   Transportation Needs: Not on file   Physical Activity: Not on file   Stress: Not on file   Social Connections: Not on file   Intimate Partner Violence: Not on file   Housing Stability: Not on file       Allergies as of 02/27/2024 - Reviewed 02/27/2024   Allergen Reaction Noted    Shellfish allergy Hives 03/15/2010    Nickel Itching 03/24/2020    Trazodone  09/26/2022          Current medications as of today   Current Outpatient Medications   Medication Sig Dispense Refill    Solriamfetol HCl 150 MG Tab Take 150 mg by mouth every morning for 90 days. 90 Tablet 0    EPINEPHrine 0.3 MG/0.3ML Solution Prefilled Syringe Inject the contents of the epipen into the thigh, hold for 3 seconds and release from thigh as needed for anaphylaxis. 1 Each 0    lidocaine (LIDODERM) 5 % Patch APPLY 1 PATCH TOPICALLY TO CLEAN, DRY SKIN. LEAVE ON FOR 24 HOURS THEN REMOVE.  MUST WAIT AT LEAST 12 HOURS BEFORE APPLYING PATCH(ES) AGAIN.      ondansetron (ZOFRAN) 4 MG Tab tablet Take 4 mg by mouth 3 times a day as needed.      cyclobenzaprine (FLEXERIL) 10 mg Tab cyclobenzaprine 10 mg tablet   TAKE 1 TABLET BY MOUTH IN THE EVENING      CALCIUM-VITAMIN D PO Take 500 mg by mouth every day.      hydrocortisone (ANUSOL-HC) 25 MG Suppos Anucort-HC 25 mg suppository  INSERT 1 SUPPOSITORY RECTALLY ONCE DAILY AS NEEDED 30 Suppository 0    tamoxifen (NOLVADEX) 20 MG tablet Take 20 mg by mouth every 48 hours.      Multiple Vitamin (MULTI-VITAMIN DAILY PO) Take 1 Tablet by mouth every day.      Cyanocobalamin (VITAMIN B 12 PO) Take 1 Tab by mouth every day.      folic acid (FOLVITE) 1 MG TABS Take 1 mg by mouth every day.      desvenlafaxine succinate (PRISTIQ)  50 MG TABLET SR 24 HR Take 50 mg by mouth every day. (Patient not taking: Reported on 2/27/2024)      buPROPion (WELLBUTRIN) 100 MG Tab Take 100 mg by mouth 2 times a day. (Patient not taking: Reported on 2/27/2024)       No current facility-administered medications for this visit.         Physical Exam:   Gen:           Alert and oriented, No apparent distress. Mood and affect appropriate, normal interaction with examiner.   Hearing:     Grossly intact.  Nose:          Normal, no lesions or deformities.  Dentition:    Good dentition.   Oropharynx:   Tongue normal, posterior pharynx without erythema or exudate.  Mallampati Classification: II  Neck:        Supple, trachea midline, no masses.  Respiratory Effort: No intercostal retractions or use of accessory muscles.   Gait and Station: Normal.  Digits and Nails: No clubbing, cyanosis, petechiae, or nodes.   Skin:        No rashes, lesions or ulcers noted.               Ext:           No cyanosis or edema.      Immunizations:  Flu: 10/14/2023  Pneumovax 23: 8/19/2023  PCV 20: 8/1/2022  Moderna SARS CoV2 Vaccine: 8/15/2021, 2/5/2021, 1/8/2021  Pfizer booster SARS-CoV-2 vaccine: 9/15/2022    Assessment / Plan:  1. Hypersomnolence disorder  - Solriamfetol HCl 150 MG Tab; Take 150 mg by mouth every day at 6 PM for 90 days.  Dispense: 30 Tablet; Refill: 5    Patient notes definite improvement on Sunosi at 150 mg with mild side effects such as occasional eye twitching.  May need to decrease dosing if side effects increase or persist.  Refill provided and plan discussed.  PHQ-9 remains elevated although also much improved from prior visit.  Follow-up in 6 months sooner if needed.  Support given.    2. CASSIE (obstructive sleep apnea)  - DME Mask and Supplies    Reviewed compliance which is improved as well, will send order for mask and supplies.  We reviewed equipment cleaning, patient was reminded to use distilled water only in humidifier chamber, reviewed equipment  replacement recommendations.  Patient follow-up 6 months sooner if needed.      Follow-up:   Return in about 6 months (around 8/27/2024) for Return with Paulina Chao PA-C.    Please note that this dictation was created using voice recognition software. I have made every reasonable attempt to correct obvious errors, but it is possible there are errors of grammar and possibly content that I did not discover before finalizing the note.

## 2024-04-16 ENCOUNTER — APPOINTMENT (RX ONLY)
Dept: URBAN - METROPOLITAN AREA CLINIC 15 | Facility: CLINIC | Age: 44
Setting detail: DERMATOLOGY
End: 2024-04-16

## 2024-04-16 DIAGNOSIS — L81.4 OTHER MELANIN HYPERPIGMENTATION: ICD-10-CM

## 2024-04-16 DIAGNOSIS — Z71.89 OTHER SPECIFIED COUNSELING: ICD-10-CM

## 2024-04-16 DIAGNOSIS — D22 MELANOCYTIC NEVI: ICD-10-CM

## 2024-04-16 DIAGNOSIS — D18.0 HEMANGIOMA: ICD-10-CM

## 2024-04-16 DIAGNOSIS — L82.1 OTHER SEBORRHEIC KERATOSIS: ICD-10-CM

## 2024-04-16 DIAGNOSIS — Z85.3 PERSONAL HISTORY OF MALIGNANT NEOPLASM OF BREAST: ICD-10-CM

## 2024-04-16 PROBLEM — D22.5 MELANOCYTIC NEVI OF TRUNK: Status: ACTIVE | Noted: 2024-04-16

## 2024-04-16 PROBLEM — D18.01 HEMANGIOMA OF SKIN AND SUBCUTANEOUS TISSUE: Status: ACTIVE | Noted: 2024-04-16

## 2024-04-16 PROCEDURE — 99203 OFFICE O/P NEW LOW 30 MIN: CPT

## 2024-04-16 PROCEDURE — ? COUNSELING

## 2024-04-16 PROCEDURE — ? DIAGNOSIS COMMENT

## 2024-04-16 ASSESSMENT — LOCATION ZONE DERM
LOCATION ZONE: TRUNK
LOCATION ZONE: FACE

## 2024-04-16 ASSESSMENT — LOCATION SIMPLE DESCRIPTION DERM
LOCATION SIMPLE: CHEST
LOCATION SIMPLE: RIGHT LOWER BACK
LOCATION SIMPLE: LEFT UPPER BACK
LOCATION SIMPLE: LEFT CHEEK

## 2024-04-16 ASSESSMENT — LOCATION DETAILED DESCRIPTION DERM
LOCATION DETAILED: LEFT INFERIOR UPPER BACK
LOCATION DETAILED: RIGHT MEDIAL INFERIOR CHEST
LOCATION DETAILED: RIGHT SUPERIOR LATERAL MIDBACK
LOCATION DETAILED: LEFT CENTRAL MALAR CHEEK

## 2024-04-16 NOTE — PROCEDURE: DIAGNOSIS COMMENT
Render Risk Assessment In Note?: no
Comment: S/p mastectomy 2020, on tamoxifen x 10 years. No radiation.
Detail Level: Zone

## 2024-06-28 ENCOUNTER — HOSPITAL ENCOUNTER (EMERGENCY)
Facility: MEDICAL CENTER | Age: 44
End: 2024-06-28
Attending: EMERGENCY MEDICINE
Payer: COMMERCIAL

## 2024-06-28 ENCOUNTER — APPOINTMENT (OUTPATIENT)
Dept: RADIOLOGY | Facility: MEDICAL CENTER | Age: 44
End: 2024-06-28
Attending: EMERGENCY MEDICINE
Payer: COMMERCIAL

## 2024-06-28 VITALS
WEIGHT: 158.29 LBS | TEMPERATURE: 98.2 F | HEIGHT: 66 IN | HEART RATE: 76 BPM | SYSTOLIC BLOOD PRESSURE: 100 MMHG | BODY MASS INDEX: 25.44 KG/M2 | DIASTOLIC BLOOD PRESSURE: 60 MMHG | RESPIRATION RATE: 16 BRPM | OXYGEN SATURATION: 94 %

## 2024-06-28 DIAGNOSIS — N83.202 CYST OF LEFT OVARY: ICD-10-CM

## 2024-06-28 DIAGNOSIS — R10.84 GENERALIZED ABDOMINAL PAIN: ICD-10-CM

## 2024-06-28 DIAGNOSIS — R11.2 NAUSEA AND VOMITING, UNSPECIFIED VOMITING TYPE: ICD-10-CM

## 2024-06-28 LAB
ALBUMIN SERPL BCP-MCNC: 4.3 G/DL (ref 3.2–4.9)
ALBUMIN/GLOB SERPL: 1.2 G/DL
ALP SERPL-CCNC: 61 U/L (ref 30–99)
ALT SERPL-CCNC: 6 U/L (ref 2–50)
ANION GAP SERPL CALC-SCNC: 12 MMOL/L (ref 7–16)
APPEARANCE UR: CLEAR
AST SERPL-CCNC: 7 U/L (ref 12–45)
BACTERIA #/AREA URNS HPF: NEGATIVE /HPF
BASOPHILS # BLD AUTO: 0.2 % (ref 0–1.8)
BASOPHILS # BLD: 0.02 K/UL (ref 0–0.12)
BILIRUB SERPL-MCNC: 0.6 MG/DL (ref 0.1–1.5)
BILIRUB UR QL STRIP.AUTO: NEGATIVE
BUN SERPL-MCNC: 12 MG/DL (ref 8–22)
CALCIUM ALBUM COR SERPL-MCNC: 8.7 MG/DL (ref 8.5–10.5)
CALCIUM SERPL-MCNC: 8.9 MG/DL (ref 8.5–10.5)
CANCER AG125 SERPL-ACNC: 166 U/ML (ref 0–35)
CHLORIDE SERPL-SCNC: 103 MMOL/L (ref 96–112)
CO2 SERPL-SCNC: 24 MMOL/L (ref 20–33)
COLOR UR: ABNORMAL
CREAT SERPL-MCNC: 0.7 MG/DL (ref 0.5–1.4)
EOSINOPHIL # BLD AUTO: 0.02 K/UL (ref 0–0.51)
EOSINOPHIL NFR BLD: 0.2 % (ref 0–6.9)
EPI CELLS #/AREA URNS HPF: NEGATIVE /HPF
ERYTHROCYTE [DISTWIDTH] IN BLOOD BY AUTOMATED COUNT: 45.1 FL (ref 35.9–50)
FLUAV RNA SPEC QL NAA+PROBE: NEGATIVE
FLUBV RNA SPEC QL NAA+PROBE: NEGATIVE
GFR SERPLBLD CREATININE-BSD FMLA CKD-EPI: 109 ML/MIN/1.73 M 2
GLOBULIN SER CALC-MCNC: 3.6 G/DL (ref 1.9–3.5)
GLUCOSE SERPL-MCNC: 117 MG/DL (ref 65–99)
GLUCOSE UR STRIP.AUTO-MCNC: NEGATIVE MG/DL
HCG SERPL QL: NEGATIVE
HCT VFR BLD AUTO: 41.6 % (ref 37–47)
HGB BLD-MCNC: 13.8 G/DL (ref 12–16)
HYALINE CASTS #/AREA URNS LPF: ABNORMAL /LPF
IMM GRANULOCYTES # BLD AUTO: 0.06 K/UL (ref 0–0.11)
IMM GRANULOCYTES NFR BLD AUTO: 0.5 % (ref 0–0.9)
KETONES UR STRIP.AUTO-MCNC: >=160 MG/DL
LEUKOCYTE ESTERASE UR QL STRIP.AUTO: ABNORMAL
LIPASE SERPL-CCNC: 23 U/L (ref 11–82)
LYMPHOCYTES # BLD AUTO: 1.02 K/UL (ref 1–4.8)
LYMPHOCYTES NFR BLD: 7.8 % (ref 22–41)
MCH RBC QN AUTO: 34.6 PG (ref 27–33)
MCHC RBC AUTO-ENTMCNC: 33.2 G/DL (ref 32.2–35.5)
MCV RBC AUTO: 104.3 FL (ref 81.4–97.8)
MICRO URNS: ABNORMAL
MONOCYTES # BLD AUTO: 0.62 K/UL (ref 0–0.85)
MONOCYTES NFR BLD AUTO: 4.8 % (ref 0–13.4)
NEUTROPHILS # BLD AUTO: 11.31 K/UL (ref 1.82–7.42)
NEUTROPHILS NFR BLD: 86.5 % (ref 44–72)
NITRITE UR QL STRIP.AUTO: NEGATIVE
NRBC # BLD AUTO: 0 K/UL
NRBC BLD-RTO: 0 /100 WBC (ref 0–0.2)
PH UR STRIP.AUTO: 6 [PH] (ref 5–8)
PLATELET # BLD AUTO: 343 K/UL (ref 164–446)
PMV BLD AUTO: 9.2 FL (ref 9–12.9)
POTASSIUM SERPL-SCNC: 4.4 MMOL/L (ref 3.6–5.5)
PROT SERPL-MCNC: 7.9 G/DL (ref 6–8.2)
PROT UR QL STRIP: 30 MG/DL
RBC # BLD AUTO: 3.99 M/UL (ref 4.2–5.4)
RBC # URNS HPF: ABNORMAL /HPF
RBC UR QL AUTO: ABNORMAL
RSV RNA SPEC QL NAA+PROBE: NEGATIVE
SARS-COV-2 RNA RESP QL NAA+PROBE: NOTDETECTED
SODIUM SERPL-SCNC: 139 MMOL/L (ref 135–145)
SP GR UR STRIP.AUTO: 1.03
UROBILINOGEN UR STRIP.AUTO-MCNC: 1 MG/DL
WBC # BLD AUTO: 13.1 K/UL (ref 4.8–10.8)
WBC #/AREA URNS HPF: ABNORMAL /HPF

## 2024-06-28 PROCEDURE — 85025 COMPLETE CBC W/AUTO DIFF WBC: CPT

## 2024-06-28 PROCEDURE — 700117 HCHG RX CONTRAST REV CODE 255: Performed by: EMERGENCY MEDICINE

## 2024-06-28 PROCEDURE — 74177 CT ABD & PELVIS W/CONTRAST: CPT

## 2024-06-28 PROCEDURE — 80053 COMPREHEN METABOLIC PANEL: CPT

## 2024-06-28 PROCEDURE — 700105 HCHG RX REV CODE 258: Performed by: EMERGENCY MEDICINE

## 2024-06-28 PROCEDURE — 84703 CHORIONIC GONADOTROPIN ASSAY: CPT

## 2024-06-28 PROCEDURE — 96374 THER/PROPH/DIAG INJ IV PUSH: CPT

## 2024-06-28 PROCEDURE — 0241U HCHG SARS-COV-2 COVID-19 NFCT DS RESP RNA 4 TRGT ED POC: CPT

## 2024-06-28 PROCEDURE — 96376 TX/PRO/DX INJ SAME DRUG ADON: CPT

## 2024-06-28 PROCEDURE — 83690 ASSAY OF LIPASE: CPT

## 2024-06-28 PROCEDURE — 99284 EMERGENCY DEPT VISIT MOD MDM: CPT

## 2024-06-28 PROCEDURE — 76856 US EXAM PELVIC COMPLETE: CPT

## 2024-06-28 PROCEDURE — 86304 IMMUNOASSAY TUMOR CA 125: CPT

## 2024-06-28 PROCEDURE — 36415 COLL VENOUS BLD VENIPUNCTURE: CPT

## 2024-06-28 PROCEDURE — 71045 X-RAY EXAM CHEST 1 VIEW: CPT

## 2024-06-28 PROCEDURE — 81001 URINALYSIS AUTO W/SCOPE: CPT

## 2024-06-28 PROCEDURE — 700111 HCHG RX REV CODE 636 W/ 250 OVERRIDE (IP): Mod: JZ | Performed by: EMERGENCY MEDICINE

## 2024-06-28 PROCEDURE — 96375 TX/PRO/DX INJ NEW DRUG ADDON: CPT

## 2024-06-28 RX ORDER — MORPHINE SULFATE 15 MG/1
7.5-15 TABLET ORAL EVERY 8 HOURS PRN
Qty: 15 TABLET | Refills: 0 | Status: SHIPPED | OUTPATIENT
Start: 2024-06-28 | End: 2024-07-03

## 2024-06-28 RX ORDER — MORPHINE SULFATE 15 MG/1
7.5-15 TABLET ORAL EVERY 6 HOURS PRN
Qty: 20 TABLET | Refills: 0 | Status: SHIPPED | OUTPATIENT
Start: 2024-06-28 | End: 2024-06-28

## 2024-06-28 RX ORDER — MORPHINE SULFATE 4 MG/ML
4 INJECTION INTRAVENOUS ONCE
Status: COMPLETED | OUTPATIENT
Start: 2024-06-28 | End: 2024-06-28

## 2024-06-28 RX ORDER — KETOROLAC TROMETHAMINE 15 MG/ML
10 INJECTION, SOLUTION INTRAMUSCULAR; INTRAVENOUS ONCE
Status: COMPLETED | OUTPATIENT
Start: 2024-06-28 | End: 2024-06-28

## 2024-06-28 RX ORDER — ONDANSETRON 4 MG/1
4 TABLET, ORALLY DISINTEGRATING ORAL EVERY 6 HOURS PRN
Qty: 15 TABLET | Refills: 0 | Status: SHIPPED | OUTPATIENT
Start: 2024-06-28

## 2024-06-28 RX ORDER — ONDANSETRON 2 MG/ML
4 INJECTION INTRAMUSCULAR; INTRAVENOUS ONCE
Status: COMPLETED | OUTPATIENT
Start: 2024-06-28 | End: 2024-06-28

## 2024-06-28 RX ORDER — KETOROLAC TROMETHAMINE 15 MG/ML
15 INJECTION, SOLUTION INTRAMUSCULAR; INTRAVENOUS ONCE
Status: DISCONTINUED | OUTPATIENT
Start: 2024-06-28 | End: 2024-06-28

## 2024-06-28 RX ORDER — SODIUM CHLORIDE, SODIUM LACTATE, POTASSIUM CHLORIDE, CALCIUM CHLORIDE 600; 310; 30; 20 MG/100ML; MG/100ML; MG/100ML; MG/100ML
1000 INJECTION, SOLUTION INTRAVENOUS ONCE
Status: COMPLETED | OUTPATIENT
Start: 2024-06-28 | End: 2024-06-28

## 2024-06-28 RX ADMIN — MORPHINE SULFATE 4 MG: 4 INJECTION INTRAVENOUS at 11:33

## 2024-06-28 RX ADMIN — IOHEXOL 100 ML: 350 INJECTION, SOLUTION INTRAVENOUS at 12:00

## 2024-06-28 RX ADMIN — ONDANSETRON 4 MG: 2 INJECTION INTRAMUSCULAR; INTRAVENOUS at 11:31

## 2024-06-28 RX ADMIN — ONDANSETRON 4 MG: 2 INJECTION INTRAMUSCULAR; INTRAVENOUS at 08:47

## 2024-06-28 RX ADMIN — SODIUM CHLORIDE, POTASSIUM CHLORIDE, SODIUM LACTATE AND CALCIUM CHLORIDE 1000 ML: 600; 310; 30; 20 INJECTION, SOLUTION INTRAVENOUS at 08:46

## 2024-06-28 RX ADMIN — KETOROLAC TROMETHAMINE 10.05 MG: 15 INJECTION, SOLUTION INTRAMUSCULAR; INTRAVENOUS at 12:00

## 2024-06-28 ASSESSMENT — PAIN DESCRIPTION - PAIN TYPE: TYPE: ACUTE PAIN

## 2024-06-28 ASSESSMENT — FIBROSIS 4 INDEX: FIB4 SCORE: 0.59

## 2024-06-28 NOTE — ED TRIAGE NOTES
Chief Complaint   Patient presents with    Flu Like Symptoms     Headache, n/v, cough, generalized abd pain x3 days    7/10 headache, 7/10 abd pain      Pt ambulatory to triage for above complaint.      Pt is alert/oriented and follows commands. Pt speaking in full sentences and responds appropriately to questions. No acute distress noted in triage and respirations are even and unlabored.     Respiratory swab performed and tested. Pt given urine sample cup.     Pt placed in hallway for blood draw and educated on triage process. Pt encouraged to alert staff for any changes in condition.

## 2024-06-28 NOTE — ED NOTES
Bedside report from JENAE Lew. Patient resting comfortably in bed. No immediate needs stated. Patient aware of the need for further imaging.

## 2024-07-03 ENCOUNTER — DOCUMENTATION (OUTPATIENT)
Dept: HEALTH INFORMATION MANAGEMENT | Facility: OTHER | Age: 44
End: 2024-07-03
Payer: COMMERCIAL

## 2024-07-05 DIAGNOSIS — R10.2 PELVIC PAIN: ICD-10-CM

## 2024-07-08 ENCOUNTER — HOSPITAL ENCOUNTER (OUTPATIENT)
Dept: RADIOLOGY | Facility: MEDICAL CENTER | Age: 44
End: 2024-07-08
Attending: FAMILY MEDICINE
Payer: COMMERCIAL

## 2024-07-08 DIAGNOSIS — R10.2 PELVIC PAIN: ICD-10-CM

## 2024-07-08 DIAGNOSIS — N83.8 OVARIAN MASS, LEFT: ICD-10-CM

## 2024-07-08 PROCEDURE — 76830 TRANSVAGINAL US NON-OB: CPT

## 2024-07-09 ENCOUNTER — OFFICE VISIT (OUTPATIENT)
Dept: OBGYN | Facility: CLINIC | Age: 44
End: 2024-07-09
Payer: COMMERCIAL

## 2024-07-09 VITALS
DIASTOLIC BLOOD PRESSURE: 82 MMHG | SYSTOLIC BLOOD PRESSURE: 122 MMHG | BODY MASS INDEX: 25.21 KG/M2 | WEIGHT: 160.6 LBS | HEIGHT: 67 IN

## 2024-07-09 DIAGNOSIS — N94.89 ADNEXAL MASS: ICD-10-CM

## 2024-07-09 DIAGNOSIS — C50.311 MALIGNANT NEOPLASM OF LOWER-INNER QUADRANT OF RIGHT FEMALE BREAST, UNSPECIFIED ESTROGEN RECEPTOR STATUS (HCC): ICD-10-CM

## 2024-07-09 DIAGNOSIS — D25.1 INTRAMURAL LEIOMYOMA OF UTERUS: ICD-10-CM

## 2024-07-09 PROCEDURE — 99203 OFFICE O/P NEW LOW 30 MIN: CPT | Performed by: OBSTETRICS & GYNECOLOGY

## 2024-07-09 PROCEDURE — 3074F SYST BP LT 130 MM HG: CPT | Performed by: OBSTETRICS & GYNECOLOGY

## 2024-07-09 PROCEDURE — 3079F DIAST BP 80-89 MM HG: CPT | Performed by: OBSTETRICS & GYNECOLOGY

## 2024-07-09 ASSESSMENT — FIBROSIS 4 INDEX: FIB4 SCORE: 0.37

## 2024-07-25 ENCOUNTER — APPOINTMENT (OUTPATIENT)
Dept: ADMISSIONS | Facility: MEDICAL CENTER | Age: 44
End: 2024-07-25
Attending: SPECIALIST
Payer: COMMERCIAL

## 2024-07-30 ENCOUNTER — PRE-ADMISSION TESTING (OUTPATIENT)
Dept: ADMISSIONS | Facility: MEDICAL CENTER | Age: 44
End: 2024-07-30
Attending: SPECIALIST
Payer: COMMERCIAL

## 2024-07-31 ENCOUNTER — TELEPHONE (OUTPATIENT)
Dept: HEALTH INFORMATION MANAGEMENT | Facility: OTHER | Age: 44
End: 2024-07-31

## 2024-08-12 ENCOUNTER — OFFICE VISIT (OUTPATIENT)
Dept: SLEEP MEDICINE | Facility: MEDICAL CENTER | Age: 44
End: 2024-08-12
Attending: PHYSICIAN ASSISTANT
Payer: COMMERCIAL

## 2024-08-12 ENCOUNTER — HOSPITAL ENCOUNTER (OUTPATIENT)
Dept: LAB | Facility: MEDICAL CENTER | Age: 44
End: 2024-08-12
Attending: SPECIALIST
Payer: COMMERCIAL

## 2024-08-12 ENCOUNTER — HOSPITAL ENCOUNTER (OUTPATIENT)
Dept: CARDIOLOGY | Facility: MEDICAL CENTER | Age: 44
End: 2024-08-12
Attending: SPECIALIST
Payer: COMMERCIAL

## 2024-08-12 VITALS
WEIGHT: 154 LBS | SYSTOLIC BLOOD PRESSURE: 130 MMHG | HEIGHT: 67 IN | HEART RATE: 65 BPM | OXYGEN SATURATION: 98 % | DIASTOLIC BLOOD PRESSURE: 82 MMHG | RESPIRATION RATE: 16 BRPM | BODY MASS INDEX: 24.17 KG/M2

## 2024-08-12 DIAGNOSIS — G47.33 OSA (OBSTRUCTIVE SLEEP APNEA): ICD-10-CM

## 2024-08-12 DIAGNOSIS — Z01.812 PRE-OPERATIVE LABORATORY EXAMINATION: ICD-10-CM

## 2024-08-12 DIAGNOSIS — G47.10 HYPERSOMNOLENCE DISORDER: ICD-10-CM

## 2024-08-12 LAB
ABO GROUP BLD: NORMAL
ALBUMIN SERPL BCP-MCNC: 4 G/DL (ref 3.2–4.9)
ALBUMIN/GLOB SERPL: 1.4 G/DL
ALP SERPL-CCNC: 49 U/L (ref 30–99)
ALT SERPL-CCNC: 11 U/L (ref 2–50)
ANION GAP SERPL CALC-SCNC: 10 MMOL/L (ref 7–16)
APTT PPP: 29.2 SEC (ref 24.7–36)
AST SERPL-CCNC: 15 U/L (ref 12–45)
BASOPHILS # BLD AUTO: 0.2 % (ref 0–1.8)
BASOPHILS # BLD: 0.01 K/UL (ref 0–0.12)
BILIRUB SERPL-MCNC: 0.4 MG/DL (ref 0.1–1.5)
BLD GP AB SCN SERPL QL: NORMAL
BUN SERPL-MCNC: 11 MG/DL (ref 8–22)
CALCIUM ALBUM COR SERPL-MCNC: 9.2 MG/DL (ref 8.5–10.5)
CALCIUM SERPL-MCNC: 9.2 MG/DL (ref 8.5–10.5)
CHLORIDE SERPL-SCNC: 109 MMOL/L (ref 96–112)
CO2 SERPL-SCNC: 24 MMOL/L (ref 20–33)
CREAT SERPL-MCNC: 0.76 MG/DL (ref 0.5–1.4)
EKG IMPRESSION: NORMAL
EOSINOPHIL # BLD AUTO: 0.06 K/UL (ref 0–0.51)
EOSINOPHIL NFR BLD: 1.5 % (ref 0–6.9)
ERYTHROCYTE [DISTWIDTH] IN BLOOD BY AUTOMATED COUNT: 47.5 FL (ref 35.9–50)
GFR SERPLBLD CREATININE-BSD FMLA CKD-EPI: 98 ML/MIN/1.73 M 2
GLOBULIN SER CALC-MCNC: 2.8 G/DL (ref 1.9–3.5)
GLUCOSE SERPL-MCNC: 111 MG/DL (ref 65–99)
HCG SERPL QL: NEGATIVE
HCT VFR BLD AUTO: 42.2 % (ref 37–47)
HGB BLD-MCNC: 13.4 G/DL (ref 12–16)
IMM GRANULOCYTES # BLD AUTO: 0.01 K/UL (ref 0–0.11)
IMM GRANULOCYTES NFR BLD AUTO: 0.2 % (ref 0–0.9)
INR PPP: 0.99 (ref 0.87–1.13)
LYMPHOCYTES # BLD AUTO: 1.56 K/UL (ref 1–4.8)
LYMPHOCYTES NFR BLD: 38.6 % (ref 22–41)
MCH RBC QN AUTO: 33.7 PG (ref 27–33)
MCHC RBC AUTO-ENTMCNC: 31.8 G/DL (ref 32.2–35.5)
MCV RBC AUTO: 106 FL (ref 81.4–97.8)
MONOCYTES # BLD AUTO: 0.25 K/UL (ref 0–0.85)
MONOCYTES NFR BLD AUTO: 6.2 % (ref 0–13.4)
NEUTROPHILS # BLD AUTO: 2.15 K/UL (ref 1.82–7.42)
NEUTROPHILS NFR BLD: 53.3 % (ref 44–72)
NRBC # BLD AUTO: 0 K/UL
NRBC BLD-RTO: 0 /100 WBC (ref 0–0.2)
PLATELET # BLD AUTO: 429 K/UL (ref 164–446)
PMV BLD AUTO: 9.5 FL (ref 9–12.9)
POTASSIUM SERPL-SCNC: 4.3 MMOL/L (ref 3.6–5.5)
PROT SERPL-MCNC: 6.8 G/DL (ref 6–8.2)
PROTHROMBIN TIME: 13.2 SEC (ref 12–14.6)
RBC # BLD AUTO: 3.98 M/UL (ref 4.2–5.4)
RH BLD: NORMAL
SODIUM SERPL-SCNC: 143 MMOL/L (ref 135–145)
WBC # BLD AUTO: 4 K/UL (ref 4.8–10.8)

## 2024-08-12 PROCEDURE — 84703 CHORIONIC GONADOTROPIN ASSAY: CPT

## 2024-08-12 PROCEDURE — 86900 BLOOD TYPING SEROLOGIC ABO: CPT

## 2024-08-12 PROCEDURE — 93010 ELECTROCARDIOGRAM REPORT: CPT | Performed by: STUDENT IN AN ORGANIZED HEALTH CARE EDUCATION/TRAINING PROGRAM

## 2024-08-12 PROCEDURE — 85730 THROMBOPLASTIN TIME PARTIAL: CPT

## 2024-08-12 PROCEDURE — 86901 BLOOD TYPING SEROLOGIC RH(D): CPT

## 2024-08-12 PROCEDURE — 99213 OFFICE O/P EST LOW 20 MIN: CPT | Performed by: PHYSICIAN ASSISTANT

## 2024-08-12 PROCEDURE — 3075F SYST BP GE 130 - 139MM HG: CPT | Performed by: PHYSICIAN ASSISTANT

## 2024-08-12 PROCEDURE — 93005 ELECTROCARDIOGRAM TRACING: CPT | Performed by: SPECIALIST

## 2024-08-12 PROCEDURE — 36415 COLL VENOUS BLD VENIPUNCTURE: CPT

## 2024-08-12 PROCEDURE — 80053 COMPREHEN METABOLIC PANEL: CPT

## 2024-08-12 PROCEDURE — 85610 PROTHROMBIN TIME: CPT

## 2024-08-12 PROCEDURE — 3079F DIAST BP 80-89 MM HG: CPT | Performed by: PHYSICIAN ASSISTANT

## 2024-08-12 PROCEDURE — 85025 COMPLETE CBC W/AUTO DIFF WBC: CPT

## 2024-08-12 PROCEDURE — 86850 RBC ANTIBODY SCREEN: CPT

## 2024-08-12 ASSESSMENT — ENCOUNTER SYMPTOMS
INSOMNIA: 0
WEIGHT LOSS: 0
FEVER: 0
SORE THROAT: 1
TREMORS: 0
PALPITATIONS: 0
COUGH: 0
ORTHOPNEA: 0
DIZZINESS: 1
SINUS PAIN: 0
HEADACHES: 1
HEARTBURN: 0
CHILLS: 0
WHEEZING: 0
SPUTUM PRODUCTION: 0
SHORTNESS OF BREATH: 0

## 2024-08-12 ASSESSMENT — FIBROSIS 4 INDEX: FIB4 SCORE: 0.37

## 2024-08-12 NOTE — PROGRESS NOTES
"Chief Complaint   Patient presents with    Apnea     Last Office Visit 2/27/24 with Paulina Chao P.A.-C.       HPI:  Tiarra Chavez is a 44 y.o. year old female here today for follow-up on sleep disordered breathing and excessive daytime sleepiness .  Last seen in clinic 2/27/2024 by KIRAN Montilla.     Past Medical History: Generalized anxiety, pernicious anemia, palpitations, right-sided breast cancer in 2020.  Patient reports she is pending hysterectomy oophorectomy with concerns regarding potential recurrence/metastasis of cancer.  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      vitals:  /82 (BP Location: Left arm, Patient Position: Sitting, BP Cuff Size: Adult)   Pulse 65   Resp 16   Ht 1.689 m (5' 6.5\")   Wt 69.9 kg (154 lb)   SpO2 98% BMI of 24.48 kg/m².     Recent Imaging: Chest x-ray obtained 6/28/2024 demonstrated no acute cardiopulmonary abnormalities.    Echocardiogram obtained 11/5/2020 demonstrated normal left ventricular chamber size, wall thickness, systolic and diastolic function.  LVEF estimated 65%.  Normal right ventricular size and systolic function, trace tricuspid regurgitation with estimated RVSP of 25 mmHg.      Currently using  Resmed auto CPAP @ 5-15 cm H20 pressure; compliance reviewed for 7/14/2024 through 8/12/2024, days used 28/30, average daily usage 2 hours 42 minutes, 27% of days greater than or equal to 4 hours, mask leak at 3 LPM at 95th percentile, AHI 0.6 per hour.  Needs to increase daily usage, see media for full report.    Device obtained April 2022  DME provider [facility]  Mask interface hybrid fullface mask    Home sleep study performed 1/6/2022 on a ResMed apnea link device demonstrated low O2 sat of 82%, average sat 893%.  With sats less than or equal to 88% for 8 minutes.  Snoring episodes recorded at 438.  Per pulmonologist interpretation study consistent with mild to " moderate obstructive sleep apnea, GINA of 13.2.     Home sleep test WatchPAT obtained 6/6/2023 demonstrated per pulmonologist report no significant CASSIE H with pAHI of 3.8/h, 4.4/h supine.  Low O2 sats 91% with baseline 95%.  Snoring reported as exceeding 45 dB or so for 11.8 minutes. Patient reports not sleeping well for the study.     Polysomnogram study obtained 7/10/2023 demonstrated mild sleep apnea of 5.89/h increasing to 17.8/h during REM sleep and 7.65/h supine.  Patient with low O2 sat of 91% with 0 minutes less than or equal to 88%.  Patient denied need to split-night criteria secondary to reduced sleep efficiency with only 1 non-REM REM cycle and awake time period of 2-1/2 hours during the middle of the night.    Sleep schedule goes to bed 730, wakens 5 am , and gets up during the night 1-2x    Symptoms  improved fatigue, improved sleep quality, is removing mask in sleep does replace if wakes up    [place] Sleepiness Scale previously 18/24 on 8/23,   11/24 reported on 8/12/2024  Stop Bang Score 2 (8/16/2023  9:13 AM)         Review of Systems   Constitutional:  Positive for malaise/fatigue. Negative for chills, fever and weight loss.   HENT:  Positive for sore throat (from smoke at present). Negative for congestion, hearing loss, nosebleeds, sinus pain and tinnitus.    Eyes:         Presc glasses    Respiratory:  Negative for cough, sputum production, shortness of breath and wheezing.    Cardiovascular:  Negative for chest pain, palpitations (EKG normal), orthopnea and leg swelling.   Gastrointestinal:  Negative for heartburn.        No dentures, no missing teeth, uses retainer for grinding, no swallowing issues    Neurological:  Positive for dizziness (sometimes) and headaches (no specific times). Negative for tremors.   Psychiatric/Behavioral:  The patient does not have insomnia.        Past Medical History:   Diagnosis Date    Allergic rhinitis 04/07/2011    Cancer (HCC) 2020    breast right     "Headache(784.0)     When in school, not a problem now.  With sleep deprivation.    Palpitations     Pernicious anemia     Seizure (HCC)     occured after mastectomy surgery    Sleep apnea     uses cpap    Snoring     Urinary incontinence     minor       Past Surgical History:   Procedure Laterality Date    MASS EXCISION GENERAL Left 1/10/2022    Procedure: EXCISION, MASS - CHEST WALL;  Surgeon: Sophy Holbrook M.D.;  Location: SURGERY [facility];  Service: General    PB MASTECTOMY, SIMPLE, COMPLETE Bilateral 3/26/2020    Procedure: MASTECTOMY;  Surgeon: Sophy Holbrook M.D.;  Location: SURGERY [facility];  Service: General    NODE BIOPSY SENTINEL Right 3/26/2020    Procedure: BIOPSY, LYMPH NODE, SENTINEL;  Surgeon: Sophy Holbrook M.D.;  Location: SURGERY [facility];  Service: General    BREAST BIOPSY Right 01/06/2020    Rt Breast Bx    VAGINAL PERINEAL EXAM REPAIR  5/14/2012    Performed by HUSSAIN RANGEL at LABOR AND DELIVERY    LUMPECTOMY  2001    [facility]    OTHER Left 1981    laceration repair  left leg       Family History   Problem Relation Age of Onset    Hypertension Mother     Thyroid Mother         Hypothyroid    Hyperlipidemia Father     Cancer Father         T cell lymphoma    Cancer Paternal Uncle         lymphoma    Heart Disease Maternal Grandfather         MI 38 yo, unknown RF.       Social History     Socioeconomic History    Marital status: Single     Spouse name: Not on file    Number of children: Not on file    Years of education: Not on file    Highest education level: Not on file   Occupational History     Comment: pharmacict   Tobacco Use    Smoking status: Never    Smokeless tobacco: Never   Vaping Use    Vaping status: Never Used   Substance and Sexual Activity    Alcohol use: Never    Drug use: Not Currently     Types: Marijuana, Oral     Comment: couple times a week     Sexual activity: Yes     Partners: Male     Birth control/protection: Condom, Pill   Other " Topics Concern    Not on file   Social History Narrative    Not on file     Social Determinants of Health     Financial Resource Strain: Not on file   Food Insecurity: Not on file   Transportation Needs: Not on file   Physical Activity: Not on file   Stress: Not on file   Social Connections: Not on file   Intimate Partner Violence: Not on file   Housing Stability: Not on file       Allergies as of 08/12/2024 - Reviewed 08/12/2024   Allergen Reaction Noted    Meperidine  07/30/2024    Shellfish allergy Hives 03/15/2010    Nickel Itching 03/24/2020    Trazodone  09/26/2022          Current medications as of today   Current Outpatient Medications   Medication Sig Dispense Refill    ondansetron (ZOFRAN ODT) 4 MG TABLET DISPERSIBLE Take 1 Tablet by mouth every 6 hours as needed for Nausea/Vomiting. (Patient taking differently: Take 4 mg by mouth every 6 hours as needed for Nausea/Vomiting.       MEDICATION INSTRUCTIONS FOR SURGERY/PROCEDURE SCHEDULED FOR 8/22   CONTINUE TAKING MED PRIOR TO SURGERY) 15 Tablet 0    EPINEPHrine 0.3 MG/0.3ML Solution Prefilled Syringe Inject the contents of the epipen into the thigh, hold for 3 seconds and release from thigh as needed for anaphylaxis. (Patient taking differently: Inject the contents of the epipen into the thigh, hold for 3 seconds and release from thigh as needed for anaphylaxis.      MEDICATION INSTRUCTIONS FOR SURGERY/PROCEDURE SCHEDULED FOR 8/22   CONTINUE TAKING MED PRIOR TO SURGERY) 1 Each 0    lidocaine (LIDODERM) 5 % Patch Place 1 Patch on the skin.     DO NOT TAKE DAY OF SURGERY      CALCIUM-VITAMIN D PO Take 500 mg by mouth every day.       MEDICATION INSTRUCTIONS FOR SURGERY/PROCEDURE SCHEDULED FOR 8/22   DO NOT TAKE 7 DAYS PRIOR TO SURGERY      hydrocortisone (ANUSOL-HC) 25 MG Suppos Anucort-HC 25 mg suppository  INSERT 1 SUPPOSITORY RECTALLY ONCE DAILY AS NEEDED (Patient taking differently: Anucort-HC 25 mg suppository  INSERT 1 SUPPOSITORY RECTALLY ONCE DAILY AS  NEEDED      MEDICATION INSTRUCTIONS FOR SURGERY/PROCEDURE SCHEDULED FOR 8/22   CONTINUE TAKING MED PRIOR TO SURGERY) 30 Suppository 0    tamoxifen (NOLVADEX) 20 MG tablet Take 20 mg by mouth every 48 hours.     DO NOT TAKE DAY OF SURGERY      Multiple Vitamin (MULTI-VITAMIN DAILY PO) Take 1 Tablet by mouth every day.       MEDICATION INSTRUCTIONS FOR SURGERY/PROCEDURE SCHEDULED FOR 8/22   DO NOT TAKE 7 DAYS PRIOR TO SURGERY      Cyanocobalamin (VITAMIN B 12 PO) Take 1,000 mcg by mouth every day.       MEDICATION INSTRUCTIONS FOR SURGERY/PROCEDURE SCHEDULED FOR 8/22   DO NOT TAKE 7 DAYS PRIOR TO SURGERY      folic acid (FOLVITE) 1 MG TABS Take 1 mg by mouth every day.     MEDICATION INSTRUCTIONS FOR SURGERY/PROCEDURE SCHEDULED FOR 8/22   CONTINUE TAKING MED PRIOR TO SURGERY       No current facility-administered medications for this visit.         Physical Exam:   Gen:           Alert and oriented, No apparent distress. Mood and affect appropriate, normal interaction with examiner.   Hearing:     Grossly intact.  Nose:          Normal, no lesions or deformities.  Dentition:    Good dentition.   Oropharynx:   Tongue normal, posterior pharynx without erythema or exudate.  Mallampati Classification: II  Neck:        Supple, trachea midline, no masses.  Respiratory Effort: No intercostal retractions or use of accessory muscles.   Gait and Station: Normal.  Digits and Nails: No clubbing, cyanosis, petechiae, or nodes.   Skin:        No rashes, lesions or ulcers noted.               Ext:           No cyanosis or edema.      Immunizations:  Flu:8/19/2023  PCV 20: 8/1/2022  Pfizer booster SARS-CoV-2 vaccine: 9/15/2022   Moderna SARS CoV2 Vaccine: 8/15/2021, 2/2/2021, 1/5/2021  Moderna COVID-19 mRNA vaccine: 10/14/2023    Assessment / Plan:  1. CASSIE (obstructive sleep apnea)  - DME Mask and Supplies    Improved hypersomnolence on Sunosi at 150 mg/day.  Reviewed CPAP compliance demonstrating nearly daily use.  Patient reports  benefit with both treatments.  She has however been removing her mask during her sleep and needs to increase daily usage time to meet therapeutic goals.  Will trial alternative mask as it is less easy to remove.  Send updated order to [facility].  Patient follow-up in 6 months.    2. Hypersomnolence disorder  - Solriamfetol HCl 150 MG Tab; Take 150 mg by mouth every day for 180 days.  Dispense: 90 Tablet; Refill: 1    Patient reports marked benefit with treatment,  allowing improved quality of life, improved family time.        Follow-up:   Return in about 6 months (around 2/12/2025) for Return with Paulina Chao PA-C.    Please note that this dictation was created using voice recognition software. I have made every reasonable attempt to correct obvious errors, but it is possible there are errors of grammar and possibly content that I did not discover before finalizing the note.

## 2024-08-12 NOTE — PATIENT INSTRUCTIONS
1-improved hypersomnolence on sunosi at 150 mg  2-reviewed compliance demonstrating near daily use  3-reports benefit with treatment  4-unfortunately has been removing in sleep and needs to increase daily usage time for therapeutic goal   5-will trial alternative mask as it is less easy to remove   6-send order to [facility]   7-follow up in 6 months

## 2024-08-17 ENCOUNTER — HOSPITAL ENCOUNTER (OUTPATIENT)
Dept: RADIOLOGY | Facility: MEDICAL CENTER | Age: 44
End: 2024-08-17
Attending: SPECIALIST
Payer: COMMERCIAL

## 2024-08-17 DIAGNOSIS — Z01.811 PRE-OPERATIVE RESPIRATORY EXAMINATION: ICD-10-CM

## 2024-08-17 PROCEDURE — 71046 X-RAY EXAM CHEST 2 VIEWS: CPT

## 2024-08-22 ENCOUNTER — ANESTHESIA (OUTPATIENT)
Dept: SURGERY | Facility: MEDICAL CENTER | Age: 44
End: 2024-08-22
Payer: COMMERCIAL

## 2024-08-22 ENCOUNTER — HOSPITAL ENCOUNTER (OUTPATIENT)
Facility: MEDICAL CENTER | Age: 44
End: 2024-08-23
Attending: SPECIALIST | Admitting: SPECIALIST
Payer: COMMERCIAL

## 2024-08-22 ENCOUNTER — ANESTHESIA EVENT (OUTPATIENT)
Dept: SURGERY | Facility: MEDICAL CENTER | Age: 44
End: 2024-08-22
Payer: COMMERCIAL

## 2024-08-22 ENCOUNTER — APPOINTMENT (OUTPATIENT)
Dept: RADIOLOGY | Facility: MEDICAL CENTER | Age: 44
End: 2024-08-22
Attending: STUDENT IN AN ORGANIZED HEALTH CARE EDUCATION/TRAINING PROGRAM
Payer: COMMERCIAL

## 2024-08-22 PROBLEM — R19.00 PELVIC MASS: Status: ACTIVE | Noted: 2024-08-22

## 2024-08-22 LAB — HCG UR QL: NEGATIVE

## 2024-08-22 PROCEDURE — 160042 HCHG SURGERY MINUTES - EA ADDL 1 MIN LEVEL 5: Performed by: SPECIALIST

## 2024-08-22 PROCEDURE — 700101 HCHG RX REV CODE 250: Performed by: ANESTHESIOLOGY

## 2024-08-22 PROCEDURE — 160031 HCHG SURGERY MINUTES - 1ST 30 MINS LEVEL 5: Performed by: SPECIALIST

## 2024-08-22 PROCEDURE — 700102 HCHG RX REV CODE 250 W/ 637 OVERRIDE(OP): Performed by: STUDENT IN AN ORGANIZED HEALTH CARE EDUCATION/TRAINING PROGRAM

## 2024-08-22 PROCEDURE — 700101 HCHG RX REV CODE 250: Performed by: STUDENT IN AN ORGANIZED HEALTH CARE EDUCATION/TRAINING PROGRAM

## 2024-08-22 PROCEDURE — 160009 HCHG ANES TIME/MIN: Performed by: SPECIALIST

## 2024-08-22 PROCEDURE — 700101 HCHG RX REV CODE 250: Performed by: SPECIALIST

## 2024-08-22 PROCEDURE — 700111 HCHG RX REV CODE 636 W/ 250 OVERRIDE (IP): Performed by: STUDENT IN AN ORGANIZED HEALTH CARE EDUCATION/TRAINING PROGRAM

## 2024-08-22 PROCEDURE — C2617 STENT, NON-COR, TEM W/O DEL: HCPCS | Performed by: SPECIALIST

## 2024-08-22 PROCEDURE — 160025 RECOVERY II MINUTES (STATS): Performed by: SPECIALIST

## 2024-08-22 PROCEDURE — 160036 HCHG PACU - EA ADDL 30 MINS PHASE I: Performed by: SPECIALIST

## 2024-08-22 PROCEDURE — 700102 HCHG RX REV CODE 250 W/ 637 OVERRIDE(OP): Performed by: ANESTHESIOLOGY

## 2024-08-22 PROCEDURE — 88304 TISSUE EXAM BY PATHOLOGIST: CPT

## 2024-08-22 PROCEDURE — C1769 GUIDE WIRE: HCPCS | Performed by: SPECIALIST

## 2024-08-22 PROCEDURE — 88307 TISSUE EXAM BY PATHOLOGIST: CPT

## 2024-08-22 PROCEDURE — 700105 HCHG RX REV CODE 258: Performed by: STUDENT IN AN ORGANIZED HEALTH CARE EDUCATION/TRAINING PROGRAM

## 2024-08-22 PROCEDURE — 74018 RADEX ABDOMEN 1 VIEW: CPT

## 2024-08-22 PROCEDURE — 160047 HCHG PACU  - EA ADDL 30 MINS PHASE II: Performed by: SPECIALIST

## 2024-08-22 PROCEDURE — A9270 NON-COVERED ITEM OR SERVICE: HCPCS | Performed by: ANESTHESIOLOGY

## 2024-08-22 PROCEDURE — 502714 HCHG ROBOTIC SURGERY SERVICES: Performed by: SPECIALIST

## 2024-08-22 PROCEDURE — 160002 HCHG RECOVERY MINUTES (STAT): Performed by: SPECIALIST

## 2024-08-22 PROCEDURE — 160035 HCHG PACU - 1ST 60 MINS PHASE I: Performed by: SPECIALIST

## 2024-08-22 PROCEDURE — A9270 NON-COVERED ITEM OR SERVICE: HCPCS | Performed by: STUDENT IN AN ORGANIZED HEALTH CARE EDUCATION/TRAINING PROGRAM

## 2024-08-22 PROCEDURE — 81025 URINE PREGNANCY TEST: CPT

## 2024-08-22 PROCEDURE — 160048 HCHG OR STATISTICAL LEVEL 1-5: Performed by: SPECIALIST

## 2024-08-22 PROCEDURE — 160046 HCHG PACU - 1ST 60 MINS PHASE II: Performed by: SPECIALIST

## 2024-08-22 PROCEDURE — 700105 HCHG RX REV CODE 258: Performed by: SPECIALIST

## 2024-08-22 PROCEDURE — 770004 HCHG ROOM/CARE - ONCOLOGY PRIVATE *

## 2024-08-22 PROCEDURE — 700111 HCHG RX REV CODE 636 W/ 250 OVERRIDE (IP): Performed by: ANESTHESIOLOGY

## 2024-08-22 PROCEDURE — 700111 HCHG RX REV CODE 636 W/ 250 OVERRIDE (IP): Mod: JZ | Performed by: STUDENT IN AN ORGANIZED HEALTH CARE EDUCATION/TRAINING PROGRAM

## 2024-08-22 DEVICE — STENT UROLOGICAL POLARIS 6X22 ULTRA: Type: IMPLANTABLE DEVICE | Site: URETER | Status: FUNCTIONAL

## 2024-08-22 RX ORDER — EPHEDRINE SULFATE 50 MG/ML
5 INJECTION, SOLUTION INTRAVENOUS
Status: DISCONTINUED | OUTPATIENT
Start: 2024-08-22 | End: 2024-08-22

## 2024-08-22 RX ORDER — HALOPERIDOL 5 MG/ML
1 INJECTION INTRAMUSCULAR
Status: DISCONTINUED | OUTPATIENT
Start: 2024-08-22 | End: 2024-08-22

## 2024-08-22 RX ORDER — KETOROLAC TROMETHAMINE 15 MG/ML
INJECTION, SOLUTION INTRAMUSCULAR; INTRAVENOUS PRN
Status: DISCONTINUED | OUTPATIENT
Start: 2024-08-22 | End: 2024-08-22 | Stop reason: SURG

## 2024-08-22 RX ORDER — OXYCODONE HCL 5 MG/5 ML
10 SOLUTION, ORAL ORAL
Status: DISCONTINUED | OUTPATIENT
Start: 2024-08-22 | End: 2024-08-22

## 2024-08-22 RX ORDER — ONDANSETRON 2 MG/ML
INJECTION INTRAMUSCULAR; INTRAVENOUS PRN
Status: DISCONTINUED | OUTPATIENT
Start: 2024-08-22 | End: 2024-08-22 | Stop reason: SURG

## 2024-08-22 RX ORDER — LIDOCAINE HYDROCHLORIDE 20 MG/ML
INJECTION, SOLUTION EPIDURAL; INFILTRATION; INTRACAUDAL; PERINEURAL PRN
Status: DISCONTINUED | OUTPATIENT
Start: 2024-08-22 | End: 2024-08-22 | Stop reason: SURG

## 2024-08-22 RX ORDER — OXYCODONE HYDROCHLORIDE 5 MG/1
5 TABLET ORAL
Status: DISCONTINUED | OUTPATIENT
Start: 2024-08-22 | End: 2024-08-23 | Stop reason: HOSPADM

## 2024-08-22 RX ORDER — CEFAZOLIN SODIUM 1 G/3ML
INJECTION, POWDER, FOR SOLUTION INTRAMUSCULAR; INTRAVENOUS PRN
Status: DISCONTINUED | OUTPATIENT
Start: 2024-08-22 | End: 2024-08-22 | Stop reason: SURG

## 2024-08-22 RX ORDER — OXYCODONE HYDROCHLORIDE 5 MG/1
2.5 TABLET ORAL
Status: DISCONTINUED | OUTPATIENT
Start: 2024-08-22 | End: 2024-08-23 | Stop reason: HOSPADM

## 2024-08-22 RX ORDER — HYDROMORPHONE HYDROCHLORIDE 1 MG/ML
0.4 INJECTION, SOLUTION INTRAMUSCULAR; INTRAVENOUS; SUBCUTANEOUS
Status: DISCONTINUED | OUTPATIENT
Start: 2024-08-22 | End: 2024-08-22

## 2024-08-22 RX ORDER — IBUPROFEN 800 MG/1
800 TABLET, FILM COATED ORAL 3 TIMES DAILY PRN
Status: DISCONTINUED | OUTPATIENT
Start: 2024-08-27 | End: 2024-08-23 | Stop reason: HOSPADM

## 2024-08-22 RX ORDER — TRETINOIN 1 MG/G
1 CREAM TOPICAL
COMMUNITY

## 2024-08-22 RX ORDER — HYDROMORPHONE HYDROCHLORIDE 1 MG/ML
0.1 INJECTION, SOLUTION INTRAMUSCULAR; INTRAVENOUS; SUBCUTANEOUS
Status: DISCONTINUED | OUTPATIENT
Start: 2024-08-22 | End: 2024-08-22

## 2024-08-22 RX ORDER — ACETAMINOPHEN 500 MG
1000 TABLET ORAL EVERY 6 HOURS PRN
Status: DISCONTINUED | OUTPATIENT
Start: 2024-08-28 | End: 2024-08-23 | Stop reason: HOSPADM

## 2024-08-22 RX ORDER — ACETAMINOPHEN 500 MG
1000 TABLET ORAL EVERY 6 HOURS
Status: DISCONTINUED | OUTPATIENT
Start: 2024-08-22 | End: 2024-08-23 | Stop reason: HOSPADM

## 2024-08-22 RX ORDER — SODIUM CHLORIDE, SODIUM LACTATE, POTASSIUM CHLORIDE, CALCIUM CHLORIDE 600; 310; 30; 20 MG/100ML; MG/100ML; MG/100ML; MG/100ML
INJECTION, SOLUTION INTRAVENOUS CONTINUOUS
Status: DISCONTINUED | OUTPATIENT
Start: 2024-08-22 | End: 2024-08-22

## 2024-08-22 RX ORDER — ROCURONIUM BROMIDE 10 MG/ML
INJECTION, SOLUTION INTRAVENOUS PRN
Status: DISCONTINUED | OUTPATIENT
Start: 2024-08-22 | End: 2024-08-22 | Stop reason: SURG

## 2024-08-22 RX ORDER — SCOLOPAMINE TRANSDERMAL SYSTEM 1 MG/1
1 PATCH, EXTENDED RELEASE TRANSDERMAL
Status: DISCONTINUED | OUTPATIENT
Start: 2024-08-22 | End: 2024-08-22

## 2024-08-22 RX ORDER — LABETALOL HYDROCHLORIDE 5 MG/ML
INJECTION, SOLUTION INTRAVENOUS PRN
Status: DISCONTINUED | OUTPATIENT
Start: 2024-08-22 | End: 2024-08-22 | Stop reason: SURG

## 2024-08-22 RX ORDER — MORPHINE SULFATE 4 MG/ML
2 INJECTION INTRAVENOUS
Status: DISCONTINUED | OUTPATIENT
Start: 2024-08-22 | End: 2024-08-23 | Stop reason: HOSPADM

## 2024-08-22 RX ORDER — ALBUTEROL SULFATE 5 MG/ML
2.5 SOLUTION RESPIRATORY (INHALATION)
Status: DISCONTINUED | OUTPATIENT
Start: 2024-08-22 | End: 2024-08-22

## 2024-08-22 RX ORDER — ONDANSETRON 2 MG/ML
4 INJECTION INTRAMUSCULAR; INTRAVENOUS EVERY 6 HOURS PRN
Status: DISCONTINUED | OUTPATIENT
Start: 2024-08-22 | End: 2024-08-23 | Stop reason: HOSPADM

## 2024-08-22 RX ORDER — CEFOTETAN DISODIUM 2 G/20ML
2 INJECTION, POWDER, FOR SOLUTION INTRAMUSCULAR; INTRAVENOUS
Status: DISPENSED | OUTPATIENT
Start: 2024-08-22 | End: 2024-08-23

## 2024-08-22 RX ORDER — PROCHLORPERAZINE EDISYLATE 5 MG/ML
10 INJECTION INTRAMUSCULAR; INTRAVENOUS EVERY 6 HOURS PRN
Status: DISCONTINUED | OUTPATIENT
Start: 2024-08-22 | End: 2024-08-23 | Stop reason: HOSPADM

## 2024-08-22 RX ORDER — MAGNESIUM SULFATE HEPTAHYDRATE 40 MG/ML
INJECTION, SOLUTION INTRAVENOUS PRN
Status: DISCONTINUED | OUTPATIENT
Start: 2024-08-22 | End: 2024-08-22 | Stop reason: SURG

## 2024-08-22 RX ORDER — SODIUM CHLORIDE 9 MG/ML
INJECTION, SOLUTION INTRAVENOUS CONTINUOUS
Status: DISCONTINUED | OUTPATIENT
Start: 2024-08-22 | End: 2024-08-23 | Stop reason: HOSPADM

## 2024-08-22 RX ORDER — DEXAMETHASONE SODIUM PHOSPHATE 4 MG/ML
INJECTION, SOLUTION INTRA-ARTICULAR; INTRALESIONAL; INTRAMUSCULAR; INTRAVENOUS; SOFT TISSUE PRN
Status: DISCONTINUED | OUTPATIENT
Start: 2024-08-22 | End: 2024-08-22 | Stop reason: SURG

## 2024-08-22 RX ORDER — GABAPENTIN 300 MG/1
300 CAPSULE ORAL ONCE
Status: COMPLETED | OUTPATIENT
Start: 2024-08-22 | End: 2024-08-22

## 2024-08-22 RX ORDER — IBUPROFEN 800 MG/1
800 TABLET, FILM COATED ORAL EVERY 8 HOURS
Status: DISCONTINUED | OUTPATIENT
Start: 2024-08-22 | End: 2024-08-23 | Stop reason: HOSPADM

## 2024-08-22 RX ORDER — HYDROMORPHONE HYDROCHLORIDE 1 MG/ML
0.2 INJECTION, SOLUTION INTRAMUSCULAR; INTRAVENOUS; SUBCUTANEOUS
Status: DISCONTINUED | OUTPATIENT
Start: 2024-08-22 | End: 2024-08-22

## 2024-08-22 RX ORDER — METOPROLOL TARTRATE 1 MG/ML
1 INJECTION, SOLUTION INTRAVENOUS
Status: DISCONTINUED | OUTPATIENT
Start: 2024-08-22 | End: 2024-08-22

## 2024-08-22 RX ORDER — DIPHENHYDRAMINE HYDROCHLORIDE 50 MG/ML
12.5 INJECTION INTRAMUSCULAR; INTRAVENOUS
Status: DISCONTINUED | OUTPATIENT
Start: 2024-08-22 | End: 2024-08-22

## 2024-08-22 RX ORDER — SODIUM CHLORIDE, SODIUM LACTATE, POTASSIUM CHLORIDE, CALCIUM CHLORIDE 600; 310; 30; 20 MG/100ML; MG/100ML; MG/100ML; MG/100ML
INJECTION, SOLUTION INTRAVENOUS CONTINUOUS
Status: ACTIVE | OUTPATIENT
Start: 2024-08-22 | End: 2024-08-22

## 2024-08-22 RX ORDER — OXYCODONE HCL 5 MG/5 ML
5 SOLUTION, ORAL ORAL
Status: DISCONTINUED | OUTPATIENT
Start: 2024-08-22 | End: 2024-08-22

## 2024-08-22 RX ORDER — MIDAZOLAM HYDROCHLORIDE 1 MG/ML
INJECTION INTRAMUSCULAR; INTRAVENOUS PRN
Status: DISCONTINUED | OUTPATIENT
Start: 2024-08-22 | End: 2024-08-22 | Stop reason: SURG

## 2024-08-22 RX ORDER — SODIUM FLUORIDE 5 MG/G
1 GEL, DENTIFRICE DENTAL
COMMUNITY

## 2024-08-22 RX ORDER — HYDRALAZINE HYDROCHLORIDE 20 MG/ML
5 INJECTION INTRAMUSCULAR; INTRAVENOUS
Status: DISCONTINUED | OUTPATIENT
Start: 2024-08-22 | End: 2024-08-22

## 2024-08-22 RX ORDER — ACETAMINOPHEN 500 MG
1000 TABLET ORAL ONCE
Status: COMPLETED | OUTPATIENT
Start: 2024-08-22 | End: 2024-08-22

## 2024-08-22 RX ORDER — BUPIVACAINE HYDROCHLORIDE AND EPINEPHRINE 2.5; 5 MG/ML; UG/ML
INJECTION, SOLUTION EPIDURAL; INFILTRATION; INTRACAUDAL; PERINEURAL
Status: DISCONTINUED | OUTPATIENT
Start: 2024-08-22 | End: 2024-08-22 | Stop reason: HOSPADM

## 2024-08-22 RX ORDER — ONDANSETRON 2 MG/ML
4 INJECTION INTRAMUSCULAR; INTRAVENOUS
Status: DISCONTINUED | OUTPATIENT
Start: 2024-08-22 | End: 2024-08-22

## 2024-08-22 RX ADMIN — LIDOCAINE HYDROCHLORIDE 5 MG: 20 INJECTION, SOLUTION EPIDURAL; INFILTRATION; INTRACAUDAL at 14:15

## 2024-08-22 RX ADMIN — ROCURONIUM BROMIDE 10 MG: 50 INJECTION, SOLUTION INTRAVENOUS at 16:09

## 2024-08-22 RX ADMIN — LABETALOL HYDROCHLORIDE 5 MG: 5 INJECTION, SOLUTION INTRAVENOUS at 15:13

## 2024-08-22 RX ADMIN — ROCURONIUM BROMIDE 25 MG: 50 INJECTION, SOLUTION INTRAVENOUS at 15:33

## 2024-08-22 RX ADMIN — LABETALOL HYDROCHLORIDE 5 MG: 5 INJECTION, SOLUTION INTRAVENOUS at 15:02

## 2024-08-22 RX ADMIN — PROPOFOL 125 MG: 10 INJECTION, EMULSION INTRAVENOUS at 14:15

## 2024-08-22 RX ADMIN — MIDAZOLAM HYDROCHLORIDE 2 MG: 2 INJECTION, SOLUTION INTRAMUSCULAR; INTRAVENOUS at 14:12

## 2024-08-22 RX ADMIN — SUGAMMADEX 150 MG: 100 INJECTION, SOLUTION INTRAVENOUS at 16:48

## 2024-08-22 RX ADMIN — ONDANSETRON 4 MG: 2 INJECTION INTRAMUSCULAR; INTRAVENOUS at 14:38

## 2024-08-22 RX ADMIN — SODIUM CHLORIDE, POTASSIUM CHLORIDE, SODIUM LACTATE AND CALCIUM CHLORIDE: 600; 310; 30; 20 INJECTION, SOLUTION INTRAVENOUS at 14:13

## 2024-08-22 RX ADMIN — ONDANSETRON 4 MG: 2 INJECTION INTRAMUSCULAR; INTRAVENOUS at 22:12

## 2024-08-22 RX ADMIN — IBUPROFEN 800 MG: 800 TABLET, FILM COATED ORAL at 21:21

## 2024-08-22 RX ADMIN — KETOROLAC TROMETHAMINE 15 MG: 15 INJECTION, SOLUTION INTRAMUSCULAR; INTRAVENOUS at 16:45

## 2024-08-22 RX ADMIN — SCOPOLAMINE 1 PATCH: 1.5 PATCH, EXTENDED RELEASE TRANSDERMAL at 12:55

## 2024-08-22 RX ADMIN — MAGNESIUM SULFATE HEPTAHYDRATE 2 G: 4 INJECTION, SOLUTION INTRAVENOUS at 15:21

## 2024-08-22 RX ADMIN — DEXAMETHASONE SODIUM PHOSPHATE 8 MG: 4 INJECTION INTRA-ARTICULAR; INTRALESIONAL; INTRAMUSCULAR; INTRAVENOUS; SOFT TISSUE at 14:35

## 2024-08-22 RX ADMIN — FENTANYL CITRATE 75 MCG: 50 INJECTION, SOLUTION INTRAMUSCULAR; INTRAVENOUS at 14:52

## 2024-08-22 RX ADMIN — GABAPENTIN 300 MG: 300 CAPSULE ORAL at 12:55

## 2024-08-22 RX ADMIN — FENTANYL CITRATE 50 MCG: 50 INJECTION, SOLUTION INTRAMUSCULAR; INTRAVENOUS at 16:45

## 2024-08-22 RX ADMIN — CEFAZOLIN 2 G: 1 INJECTION, POWDER, FOR SOLUTION INTRAMUSCULAR; INTRAVENOUS at 14:29

## 2024-08-22 RX ADMIN — FENTANYL CITRATE 50 MCG: 50 INJECTION, SOLUTION INTRAMUSCULAR; INTRAVENOUS at 16:48

## 2024-08-22 RX ADMIN — ROCURONIUM BROMIDE 40 MG: 50 INJECTION, SOLUTION INTRAVENOUS at 14:15

## 2024-08-22 RX ADMIN — ACETAMINOPHEN 1000 MG: 500 TABLET ORAL at 12:55

## 2024-08-22 RX ADMIN — SODIUM CHLORIDE, POTASSIUM CHLORIDE, SODIUM LACTATE AND CALCIUM CHLORIDE: 600; 310; 30; 20 INJECTION, SOLUTION INTRAVENOUS at 10:30

## 2024-08-22 RX ADMIN — ROCURONIUM BROMIDE 10 MG: 50 INJECTION, SOLUTION INTRAVENOUS at 15:17

## 2024-08-22 RX ADMIN — FENTANYL CITRATE 75 MCG: 50 INJECTION, SOLUTION INTRAMUSCULAR; INTRAVENOUS at 14:40

## 2024-08-22 RX ADMIN — SODIUM CHLORIDE: 9 INJECTION, SOLUTION INTRAVENOUS at 21:19

## 2024-08-22 RX ADMIN — ACETAMINOPHEN 1000 MG: 500 TABLET ORAL at 21:21

## 2024-08-22 ASSESSMENT — PAIN DESCRIPTION - PAIN TYPE
TYPE: ACUTE PAIN
TYPE: ACUTE PAIN
TYPE: SURGICAL PAIN
TYPE: ACUTE PAIN
TYPE: SURGICAL PAIN
TYPE: ACUTE PAIN
TYPE: ACUTE PAIN
TYPE: SURGICAL PAIN
TYPE: SURGICAL PAIN
TYPE: ACUTE PAIN
TYPE: SURGICAL PAIN
TYPE: ACUTE PAIN
TYPE: SURGICAL PAIN

## 2024-08-22 ASSESSMENT — FIBROSIS 4 INDEX
FIB4 SCORE: 0.47
FIB4 SCORE: 0.47

## 2024-08-22 ASSESSMENT — PAIN SCALES - GENERAL: PAIN_LEVEL: 0

## 2024-08-22 NOTE — ANESTHESIA PREPROCEDURE EVALUATION
Case: [id number] Date/Time: 08/22/24 1215    Procedure: ROBOTIC ASSISTED TOTAL LAPAROSCOPIC HYSTERECTOMY, RIGHT AND OR LEFT SALPINGECTOMY, RIGHT AND OR LEFT OOPHORECTOMY, POSSIBLE CYSTOSCOPY, EXCISION OF FULGURATION OF ENDOMETRIOSIS    Pre-op diagnosis: PELVIC MASS, ENDOMETRIOSIS    Location: [facility] / SURGERY [facility]    Surgeons: Evans Mann M.D.          CASSIE  Hx breast ca  Relevant Problems   No relevant active problems       Physical Exam    Airway   Mallampati: II  TM distance: >3 FB  Neck ROM: full       Cardiovascular - normal exam  Rhythm: regular  Rate: normal  (-) murmur     Dental - normal exam           Pulmonary - normal exam  Breath sounds clear to auscultation     Abdominal    Neurological - normal exam                   Anesthesia Plan    ASA 2       Plan - general       Airway plan will be ETT          Induction: intravenous    Postoperative Plan: Postoperative administration of opioids is intended.    Pertinent diagnostic labs and testing reviewed    Informed Consent:    Anesthetic plan and risks discussed with patient.    Use of blood products discussed with: patient whom consented to blood products.

## 2024-08-22 NOTE — PROGRESS NOTES
Medication history reviewed with PT at bedside    [facility] is complete per PT reporting    Allergies reviewed.     Patient denies any outpatient antibiotics in the last 30 days.     Patient is not taking anticoagulants.    Preferred pharmacy for this visit - [facility] on [address] ([phone])

## 2024-08-22 NOTE — ANESTHESIA PROCEDURE NOTES
Peripheral IV    Date/Time: 8/22/2024 2:22 PM    Performed by: Cole Michel M.D.  Authorized by: Cole Michel M.D.    Size:  20 G  Laterality:  Left  Peripheral IV Location:  Hand  Site Prep:  Alcohol  Technique:  Direct puncture  Attempts:  1

## 2024-08-22 NOTE — ANESTHESIA PROCEDURE NOTES
Airway    Date/Time: 8/22/2024 2:17 PM    Performed by: Cole Michel M.D.  Authorized by: Cole Michel M.D.    Location:  OR  Urgency:  Elective  Indications for Airway Management:  Anesthesia      Spontaneous Ventilation: absent    Sedation Level:  Deep  Preoxygenated: Yes    Patient Position:  Sniffing  Final Airway Type:  Endotracheal airway  Final Endotracheal Airway:  ETT  Cuffed: Yes    Technique Used for Successful ETT Placement:  Direct laryngoscopy    Insertion Site:  Oral  Blade Type:  Jalloh  Laryngoscope Blade/Videolaryngoscope Blade Size:  2  ETT Size (mm):  7.0  Measured from:  Teeth  ETT to Teeth (cm):  20  Placement Verified by: auscultation and capnometry    Cormack-Lehane Classification:  Grade I - full view of glottis  Number of Attempts at Approach:  1

## 2024-08-23 VITALS
DIASTOLIC BLOOD PRESSURE: 69 MMHG | TEMPERATURE: 98.6 F | RESPIRATION RATE: 16 BRPM | OXYGEN SATURATION: 95 % | HEIGHT: 66 IN | HEART RATE: 82 BPM | WEIGHT: 150.35 LBS | BODY MASS INDEX: 24.16 KG/M2 | SYSTOLIC BLOOD PRESSURE: 112 MMHG

## 2024-08-23 LAB
ANION GAP SERPL CALC-SCNC: 13 MMOL/L (ref 7–16)
BASOPHILS # BLD AUTO: 0.1 % (ref 0–1.8)
BASOPHILS # BLD: 0.01 K/UL (ref 0–0.12)
BUN SERPL-MCNC: 11 MG/DL (ref 8–22)
CALCIUM SERPL-MCNC: 7.9 MG/DL (ref 8.5–10.5)
CHLORIDE SERPL-SCNC: 105 MMOL/L (ref 96–112)
CO2 SERPL-SCNC: 20 MMOL/L (ref 20–33)
CREAT SERPL-MCNC: 0.57 MG/DL (ref 0.5–1.4)
EOSINOPHIL # BLD AUTO: 0 K/UL (ref 0–0.51)
EOSINOPHIL NFR BLD: 0 % (ref 0–6.9)
ERYTHROCYTE [DISTWIDTH] IN BLOOD BY AUTOMATED COUNT: 47.1 FL (ref 35.9–50)
GFR SERPLBLD CREATININE-BSD FMLA CKD-EPI: 114 ML/MIN/1.73 M 2
GLUCOSE SERPL-MCNC: 141 MG/DL (ref 65–99)
HCT VFR BLD AUTO: 34.7 % (ref 37–47)
HGB BLD-MCNC: 11.5 G/DL (ref 12–16)
IMM GRANULOCYTES # BLD AUTO: 0.11 K/UL (ref 0–0.11)
IMM GRANULOCYTES NFR BLD AUTO: 0.7 % (ref 0–0.9)
LYMPHOCYTES # BLD AUTO: 0.85 K/UL (ref 1–4.8)
LYMPHOCYTES NFR BLD: 5.5 % (ref 22–41)
MCH RBC QN AUTO: 34 PG (ref 27–33)
MCHC RBC AUTO-ENTMCNC: 33.1 G/DL (ref 32.2–35.5)
MCV RBC AUTO: 102.7 FL (ref 81.4–97.8)
MONOCYTES # BLD AUTO: 0.88 K/UL (ref 0–0.85)
MONOCYTES NFR BLD AUTO: 5.7 % (ref 0–13.4)
NEUTROPHILS # BLD AUTO: 13.62 K/UL (ref 1.82–7.42)
NEUTROPHILS NFR BLD: 88 % (ref 44–72)
NRBC # BLD AUTO: 0 K/UL
NRBC BLD-RTO: 0 /100 WBC (ref 0–0.2)
PATHOLOGY CONSULT NOTE: NORMAL
PLATELET # BLD AUTO: 337 K/UL (ref 164–446)
PMV BLD AUTO: 9.4 FL (ref 9–12.9)
POTASSIUM SERPL-SCNC: 3.8 MMOL/L (ref 3.6–5.5)
RBC # BLD AUTO: 3.38 M/UL (ref 4.2–5.4)
SODIUM SERPL-SCNC: 138 MMOL/L (ref 135–145)
WBC # BLD AUTO: 15.5 K/UL (ref 4.8–10.8)

## 2024-08-23 PROCEDURE — G0378 HOSPITAL OBSERVATION PER HR: HCPCS

## 2024-08-23 PROCEDURE — 51798 US URINE CAPACITY MEASURE: CPT

## 2024-08-23 PROCEDURE — 85025 COMPLETE CBC W/AUTO DIFF WBC: CPT

## 2024-08-23 PROCEDURE — 96374 THER/PROPH/DIAG INJ IV PUSH: CPT

## 2024-08-23 PROCEDURE — 80048 BASIC METABOLIC PNL TOTAL CA: CPT

## 2024-08-23 PROCEDURE — A9270 NON-COVERED ITEM OR SERVICE: HCPCS | Performed by: STUDENT IN AN ORGANIZED HEALTH CARE EDUCATION/TRAINING PROGRAM

## 2024-08-23 PROCEDURE — 700111 HCHG RX REV CODE 636 W/ 250 OVERRIDE (IP): Mod: JZ | Performed by: STUDENT IN AN ORGANIZED HEALTH CARE EDUCATION/TRAINING PROGRAM

## 2024-08-23 PROCEDURE — 700102 HCHG RX REV CODE 250 W/ 637 OVERRIDE(OP): Performed by: STUDENT IN AN ORGANIZED HEALTH CARE EDUCATION/TRAINING PROGRAM

## 2024-08-23 RX ADMIN — ACETAMINOPHEN 1000 MG: 500 TABLET ORAL at 04:56

## 2024-08-23 RX ADMIN — ONDANSETRON 4 MG: 2 INJECTION INTRAMUSCULAR; INTRAVENOUS at 14:07

## 2024-08-23 RX ADMIN — IBUPROFEN 800 MG: 800 TABLET, FILM COATED ORAL at 13:15

## 2024-08-23 RX ADMIN — IBUPROFEN 800 MG: 800 TABLET, FILM COATED ORAL at 04:56

## 2024-08-23 RX ADMIN — ACETAMINOPHEN 1000 MG: 500 TABLET ORAL at 13:14

## 2024-08-23 SDOH — ECONOMIC STABILITY: TRANSPORTATION INSECURITY
IN THE PAST 12 MONTHS, HAS LACK OF RELIABLE TRANSPORTATION KEPT YOU FROM MEDICAL APPOINTMENTS, MEETINGS, WORK OR FROM GETTING THINGS NEEDED FOR DAILY LIVING?: NO

## 2024-08-23 SDOH — ECONOMIC STABILITY: TRANSPORTATION INSECURITY
IN THE PAST 12 MONTHS, HAS THE LACK OF TRANSPORTATION KEPT YOU FROM MEDICAL APPOINTMENTS OR FROM GETTING MEDICATIONS?: NO

## 2024-08-23 ASSESSMENT — LIFESTYLE VARIABLES
HAVE PEOPLE ANNOYED YOU BY CRITICIZING YOUR DRINKING: NO
ALCOHOL_USE: NO
TOTAL SCORE: 0
HAVE YOU EVER FELT YOU SHOULD CUT DOWN ON YOUR DRINKING: NO
DOES PATIENT WANT TO STOP DRINKING: NO
TOTAL SCORE: 0
TOTAL SCORE: 0
EVER HAD A DRINK FIRST THING IN THE MORNING TO STEADY YOUR NERVES TO GET RID OF A HANGOVER: NO
HOW MANY TIMES IN THE PAST YEAR HAVE YOU HAD 5 OR MORE DRINKS IN A DAY: 0
CONSUMPTION TOTAL: NEGATIVE
EVER FELT BAD OR GUILTY ABOUT YOUR DRINKING: NO
AVERAGE NUMBER OF DAYS PER WEEK YOU HAVE A DRINK CONTAINING ALCOHOL: 0
ON A TYPICAL DAY WHEN YOU DRINK ALCOHOL HOW MANY DRINKS DO YOU HAVE: 0

## 2024-08-23 ASSESSMENT — COGNITIVE AND FUNCTIONAL STATUS - GENERAL
STANDING UP FROM CHAIR USING ARMS: A LITTLE
MOBILITY SCORE: 20
SUGGESTED CMS G CODE MODIFIER MOBILITY: CJ
DRESSING REGULAR UPPER BODY CLOTHING: A LITTLE
WALKING IN HOSPITAL ROOM: A LITTLE
DAILY ACTIVITIY SCORE: 19
HELP NEEDED FOR BATHING: A LOT
DRESSING REGULAR LOWER BODY CLOTHING: A LITTLE
MOVING TO AND FROM BED TO CHAIR: A LITTLE
TOILETING: A LITTLE
CLIMB 3 TO 5 STEPS WITH RAILING: A LITTLE
SUGGESTED CMS G CODE MODIFIER DAILY ACTIVITY: CK

## 2024-08-23 ASSESSMENT — SOCIAL DETERMINANTS OF HEALTH (SDOH)
IN THE PAST 12 MONTHS, HAS THE ELECTRIC, GAS, OIL, OR WATER COMPANY THREATENED TO SHUT OFF SERVICE IN YOUR HOME?: NO
WITHIN THE LAST YEAR, HAVE YOU BEEN AFRAID OF YOUR PARTNER OR EX-PARTNER?: NO
WITHIN THE LAST YEAR, HAVE YOU BEEN HUMILIATED OR EMOTIONALLY ABUSED IN OTHER WAYS BY YOUR PARTNER OR EX-PARTNER?: NO
WITHIN THE PAST 12 MONTHS, YOU WORRIED THAT YOUR FOOD WOULD RUN OUT BEFORE YOU GOT THE MONEY TO BUY MORE: NEVER TRUE
IN THE PAST 12 MONTHS, HAS THE ELECTRIC, GAS, OIL, OR WATER COMPANY THREATENED TO SHUT OFF SERVICE IN YOUR HOME?: NO
WITHIN THE PAST 12 MONTHS, YOU WORRIED THAT YOUR FOOD WOULD RUN OUT BEFORE YOU GOT THE MONEY TO BUY MORE: NEVER TRUE
WITHIN THE LAST YEAR, HAVE YOU BEEN KICKED, HIT, SLAPPED, OR OTHERWISE PHYSICALLY HURT BY YOUR PARTNER OR EX-PARTNER?: NO
WITHIN THE PAST 12 MONTHS, THE FOOD YOU BOUGHT JUST DIDN'T LAST AND YOU DIDN'T HAVE MONEY TO GET MORE: NEVER TRUE
WITHIN THE PAST 12 MONTHS, THE FOOD YOU BOUGHT JUST DIDN'T LAST AND YOU DIDN'T HAVE MONEY TO GET MORE: NEVER TRUE
WITHIN THE LAST YEAR, HAVE TO BEEN RAPED OR FORCED TO HAVE ANY KIND OF SEXUAL ACTIVITY BY YOUR PARTNER OR EX-PARTNER?: NO

## 2024-08-23 ASSESSMENT — PATIENT HEALTH QUESTIONNAIRE - PHQ9
1. LITTLE INTEREST OR PLEASURE IN DOING THINGS: NOT AT ALL
2. FEELING DOWN, DEPRESSED, IRRITABLE, OR HOPELESS: NOT AT ALL
SUM OF ALL RESPONSES TO PHQ9 QUESTIONS 1 AND 2: 0

## 2024-08-23 NOTE — PROGRESS NOTES
4 Eyes Skin Assessment Completed by JENAE Estrada and JENAE Martinez.    Head WDL  Ears WDL  Nose WDL  Mouth WDL  Neck WDL  Breast/Chest WDL  Shoulder Blades WDL  Spine WDL  (R) Arm/Elbow/Hand WDL  (L) Arm/Elbow/Hand WDL  Abdomen Incision 5 Lap sites  Groin WDL  Scrotum/Coccyx/Buttocks WDL  (R) Leg Scab and Bruising  (L) Leg WDL  (R) Heel/Foot/Toe WDL  (L) Heel/Foot/Toe WDL          Devices In Places       Interventions In Place Pillows    Possible Skin Injury No    Pictures Uploaded Into Epic N/A  Wound Consult Placed N/A  RN Wound Prevention Protocol Ordered No

## 2024-08-23 NOTE — OR NURSING
Received patient from pacu 1 , arousable to voice. Pain 4/10. 5 lap dressing with minimal to moderate serosanguinous discharge noted.   V/s taken and reocrded.   Emily Bloom Deana , rn assisted patient  transfer from [facility] to [facility].   Patient is aware that she needs to urinate.    notified and he said he is coming .

## 2024-08-23 NOTE — ANESTHESIA TIME REPORT
Anesthesia Start and Stop Event Times       Date Time Event    8/22/2024 1409 Ready for Procedure     1409 Anesthesia Start     1708 Anesthesia Stop          Responsible Staff  08/22/24      Name Role Begin End    Cole Michel M.D. Anesth 1409 1447    Cole Hamilton M.D. Anesth 1447 1708          Overtime Reason:  no overtime (within assigned shift)    Comments:

## 2024-08-23 NOTE — ANESTHESIA POSTPROCEDURE EVALUATION
Patient: Tiarra Kaplan Chavez    Procedure Summary       Date: 08/22/24 Room / Location: [place] [address] / SURGERY [facility]    Anesthesia Start: 1409 Anesthesia Stop: 1708    Procedures:       ROBOTIC ASSISTED TOTAL LAPAROSCOPIC HYSTERECTOMY, BILATERAL SALPINGOOOPHORECTOMY, LYSIS OF ADHESIONS , CYSTOSCOPY WITH BILATERAL URETERAL STENTS (Abdomen)      APPENDECTOMY (Abdomen) Diagnosis: (STAGE IV ENDOMETRIOSIS WITH BILATERAL URETERAL FIBROSIS)    Surgeons: Evans Mann M.D. Responsible Provider: Cole Hamilton M.D.    Anesthesia Type: general ASA Status: 2            Final Anesthesia Type: general  Last vitals  BP   Blood Pressure: 111/64    Temp   36.4 °C (97.6 °F)    Pulse   64   Resp   13    SpO2   100 %      Anesthesia Post Evaluation    Patient location during evaluation: PACU  Patient participation: complete - patient participated  Level of consciousness: awake and alert  Pain score: 0    Airway patency: patent  Anesthetic complications: no  Cardiovascular status: hemodynamically stable  Respiratory status: acceptable  Hydration status: euvolemic    PONV: none          No notable events documented.     Nurse Pain Score: 0 (NPRS)

## 2024-08-23 NOTE — OR NURSING
1930 MD at bedside, VSS, pt still very sleepy/sedated.  Unable to fully assess pain as pt not able to wake up fully to answer.  Pt sitting in recliner, encouraged to drink ginger ale and tea.    2015 Pt remains very sleepy, tells me she has to pee, but unable to keep eyes open or lift arms. Notified Dr. Mann of pt condition, still very sleepy, awaiting orders at this time.   2025 Orders received to admit patient.  Pt moved over to [facility], stating she has to pee, bed pan provided.   2030 Pt unable to pee, bedpan removed. Pt complaints of pain with movement, but immediately falls back to sleep, no medications given at this time.  2040 Bed assigned, attempted to call report.   2052 Report given to Natalie EMANUEL.   left for evening, will come back in the morning to see patient.  Pt escorted up to room with transport, side rails up, belongings sent with patient.

## 2024-08-23 NOTE — OP REPORT
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     Anesthesiologist/Type of Anesthesia:  Anesthesiologist: Cole Michel M.D.; Cole Hamilton M.D./General     Surgical Staff:  Assistant: Marnie Darby P.A.-C.  Circulator: Janina Zepeda R.N.  Relief Circulator: Nanci Chandra R.N.  Scrub Person: Ruchi Srivastava; Josefa Pastor  Count [employer]: Ericka Banuelos R.N.     Specimens removed if any:  ID Type Source Tests Collected by Time Destination   A : CERVIX , UTERUS , BILATERAL FALLOPIAN TUBES AND OVARIES, CULDESAC PERITONEUM Tissue Abdominal PATHOLOGY SPECIMEN Evans Mann M.D. 8/22/2024  4:17 PM     B : APPENDIX Tissue Appendix PATHOLOGY SPECIMEN Evans Mann M.D. 8/22/2024  4:25 PM           Estimated Blood Loss: 250 cc     Findings: No evidence of ascites.  Normal right left diaphragm.  Liver capsule smooth.  Stomach appear grossly normal.  Abdominal peritoneal surfaces were markable.  Omentum appeared grossly normal.     In the pelvis multiple fibroids were noted.  There was a complete cul-de-sac obliteration.  The anterior wall of the sigmoid colon was completely densely adherent to the posterior aspect of the uterus with endometriosis on the surface of the anterior sigmoid colon resulting in frozen pelvis with complete cul-de-sac obliteration.  Bilateral endometriomas with adherent to the sigmoid colon in addition the endometriosis  extended out to the pelvic sidewall peritoneum and the uterosacral ligament the left was greater than the right.  The endometriosis extended out to the ureteric tunnel to the parametria along the uterine artery there was dense tissue noted there was quite vascular.  Again the left side was much more fibrotic and pronounced and extensive involvement in the right nevertheless both ureters were impinged by this endometriosis extension out to the parametria thus necessitating a bilateral extensive ureterolysis.  This was performed and after the ureterolysis was performed there was no obvious injury to the ureters however because of the extensive ureterolysis that was performed to level of the ureterovesical junction I elected to place a ureteral stents.     In addition evaluation of the appendix the appendix was quite large and dilated particular at the base.  Is clinically consistent with endometriosis thus we elected to remove the appendix.     Complications: none       Indication: Ms. lockhart is a pleasant 44-year-old female who was referred to me because of symptomatic leiomyomatous uterus and also endometriosis.  Patient had extensive endometriosis clinically consistent with stage IV endometriosis with cul-de-sac obliteration with anterior sigmoid colon densely adherent to the posterior aspect of the uterus.  There was endometriosis extending out to the parametria bilaterally engulfing the ureter thus necessitating bilateral ureterolysis.  As noted above due to the extensive nature of the bilateral ureterolysis I elected to place bilateral ureteral stents to minimize post sequelae complication from the resection.  Also the appendix was quite dilated clinically consistent with endometriosis thus we elected to remove this.    Patient underwent robotic hysterectomy with bilateral salpingo-oophorectomy and extensive ureterolysis and cystoscopy with bilateral ureteral stents and lysis of adhesions of the sigmoid colon from  the posterior aspect of the uterus.    Procedure: After adequate general anesthesia patient was prepped and draped in place modified dorsolithotomy position.  After an audible timeout was undertaken and the surgical team agree with the intended procedure we proceeded.     A 1 cm umbilical incision was made.  A Veress needle was inserted without difficulty. Abdominal pressure was noted to be < 5mm Hg.  Pneumoperitoneum was achieved to the abdominal pressure of 15 mmHg.  A 8 mm trocar was inserted without difficulty.  After completion of this, a laparoscope was placed through this port and exploratory laparoscopy revealed the above findings.  Two 8 mm ports were placed in the right upper quadrant 8 cm apart while one 8 mm port was placed in the left upper quadrant 8 cm apart.  After completion of this, the patient was placed in steep Trendelenburg position.  The robotic system was then docked and after docking the robotic system, the instrumentation was inserted under direct laparoscopic visualization to insure that there was no injury to the abdominal contents.  Once this was completed, the robotic camera was then docked.  We then proceeded with our da Héctor portion of the procedure.      Initial focus was turned towards the dense adhesions of the sigmoid colon to the posterior aspect of the uterus.  Using monopolar shear scissors extensive lysis of adhesions was undertaken to free the sigmoid colon off the posterior aspect of the uterus to restore the normal anatomy.  This was done meticulously and sharply.  This was done without compromising the sigmoid colon.    The right posterior leaf of the broad ligament was incised.  The right pararectal spaces were developed.  The right ureter was identified in the medial leaf of the peritoneum. The avascular space of Graves was then created.  The right  ovarian vessels were then subsequently isolated and bipolar cautery was used to cauterize the right IP and subsequently  divided.  The medial leaf of the peritoneum was then incised down to the level of the uterosacral ligament.  The right ureter was identified and was dissected laterally while developing the Okabayashi space, following completion of this the right round ligament was then divided followed by the anterior broad leaf ligament.  The deep uterine vein was skeletonized isolated cauterized well away from the ureter.    Right ureterolysis was undertaken the course of the ureter was followed into the ureteric tunnel.  The anterior division of the right anterior hypogastric artery was then skeletonized.  The right  uterine artery was identified.  The right ureter was then followed into the l right ureteric tunnel isolating the right uterine artery.  The right uterine artery was then skeletonized and isolated hemolock clip was applied clipped and subsequently divided.  The bladder peritoneum was taken down.  The large anterior broad ligament leiomyoma was retracted ventrally to provide maximal countertraction.  The course of the right ureter was followed into the ureterovesical junction.  The right vesicle cervical artery came close proximity with a broad ligament leiomyoma the broad ligament leiomyoma at the base of it and laterally was in close proximity to the ureter at the level of the ureterovesical junction.  Meticulous careful dissection was undertaken to dissect the ureter out to ensure that we did not compromise the ureter.      The left posterior leaf of the broad ligament was incised.  The left pararectal spaces were developed.  The left ureter was identified in the medial leaf of the peritoneum. The avascular space of Graves was then created.  The left ovarian vessels were then subsequently isolated and bipolar cautery was used to cauterize the  left IP and subsequently divided.  The medial leaf of the peritoneum was then incised down to the level of the uterosacral ligament.  The left ureter was identified and was  dissected laterally while developing the Okabayashi space, following completion of this the left round ligament was then divided followed by the anterior broad leaf ligament.  The deep uterine vein was skeletonized isolated cauterized well away from the ureter.    Left ureterolysis was undertaken the course of the ureter was followed into the ureteric tunnel.  The anterior division of the left anterior hypogastric artery was then skeletonized.  The left uterine artery was identified.  The left ureter was then followed into the l left ureteric tunnel isolating the left uterine artery.  The left uterine artery was then skeletonized and isolated hemolock clip was applied clipped and subsequently divided.  The bladder peritoneum was taken down.  The large anterior broad ligament leiomyoma was retracted ventrally to provide maximal countertraction.  The course of the left ureter was followed into the ureterovesical junction.  The left vesicle cervical artery came close proximity with a broad ligament leiomyoma the broad ligament leiomyoma at the base of it and laterally was in close proximity to the ureter at the level of the ureterovesical junction.  Meticulous careful dissection was undertaken to dissect the ureter out to ensure that we did not compromise the ureter. After completion of this the focus was then turned to the left side.  The remainder of the lower uterine segment was likewise divided. After completion of this, the uterus was then retracted ventrally and the uterosacral ligaments were then independently divided.  Great care was then taken to clearly not injure the ureter.  After the uterosacral ligaments were then divided, the anterior branches of the uterine vessels were then subsequently skeletonized and cauterized with bipolar cautery and divided.  The bladder peritoneum was then subsequently taken down.  Once we were assured that the bladder was dissected off the paracervical fascia, the posterior  colpotomy posterior colpotomy was made.  The vagina was circumferentially incised to complete the hysterectomy and BSO.  A 15 cm endobag was then delivered through the vagina. The specimen was delivered through the vagina. The specimen was removed through the vaginal vault without difficulty. The vaginal cuff was then closed with O Quill PDS suture in 2 layer running fashion.      Due to the extensive ureterolysis that was performed I elected to place bilateral ureteral stents to minimize potential complications postoperatively.  Thus a 30 degree cystoscope was inserted transurethrally after removal of the Garza catheter.  Using the robotic endoscope visualizing intraperitoneally and the cystoscope intravesically I then intubated a guidewire without perforating the ureter initially on the right ureter.  This was passed easily without difficulty.  After holding position of the right ureter a 6 x 22 double-J ureteral stent was then placed without difficulty.  After completion of this I then turned my focus towards the left ureteral orifice and a Glidewire was easily passed through followed by placement of a 6 x 22 double-J ureteral stents.  Both ureters were in good position.    At the completion of this due to the significant dissection that was performed on the sigmoid colon we wanted to ensure that we did not compromise the colon thus 30 cc Garza catheter was placed in the rectum reservoir.  I then left of the proximal portion of the sigmoid colon.  Through the Garza catheter I then injected 500 cc of Betadine solution along the sigmoid colon rectum to the stent there was no evidence of leak once this was established, the pelvis was then copiously irrigated with water and we established hemostasis. We then accounted for sponges and needles. Once this was confirmed correct, robotic instrumentation with withdraw and pneumoperitoneum was allowed to escape through the 8 mm ports. After appropriate expelling all the  intrabdominal pneumoperitoneum, we irrigated the subcutaneous tissue with water. The skin was reapproximated with 3-0 monocryl suture. The incision was then injected with 0.25% Marcaine for local anesthetic effect. Dressings were appropriately placed.      Patient tolerated the procedure well without any difficulties and was extubated and transferred to the PACU in stable excellet condition.

## 2024-08-23 NOTE — PROGRESS NOTES
DC instructions reviewed with pt. NO new meds prescribed. PIV x2 removed. Pt agreeable to DC plan & all questions answered.

## 2024-08-23 NOTE — CARE PLAN
The patient is Watcher - Medium risk of patient condition declining or worsening    Shift Goals  Clinical Goals: Pt will verbalize tolerable pain level  Patient Goals: pain/nausea control and rest  Family Goals: N/A    Progress made toward(s) clinical / shift goals:    Problem: Knowledge Deficit - Standard  Goal: Patient and family/care givers will demonstrate understanding of plan of care, disease process/condition, diagnostic tests and medications  Outcome: Progressing  Note: Pt is AO4. Call light within reach. Educated and understand POC. All questions answered at this time.     Problem: Fall Risk  Goal: Patient will remain free from falls  Outcome: Progressing  Note: Fall prevention measures in place, bed alarm on and connected correctly, call light within reach, hourly rounding, Education provided on fall prevention. Pt instructed to use call light prior to exiting the bed, educated on use of bed alarms, and risks and complications related to falls.       Patient is not progressing towards the following goals:      Problem: Pain - Standard  Goal: Alleviation of pain or a reduction in pain to the patient’s comfort goal  Outcome: Not Progressing  Note: Pt has taken scheduled pain medication but is not able to safely be administered any PRN narcotics because of the effects of anesthesia she has. Pt is very lethargic, and needs help with getting up to the commode.

## 2024-08-23 NOTE — DISCHARGE SUMMARY
Discharge Summary    CHIEF COMPLAINT ON ADMISSION  No chief complaint on file.      Reason for Admission  Pelvic mass     Admission Date  2024    CODE STATUS  Full Code    HPI & HOSPITAL COURSE   is a pleasant 44 year old  female whose LMP was 24. She has a past medical history significant for fibroids, palpitations, pernicious anemia, and urinary incontinence. She has a past surgical history of lumpectomy ()? vaginal perineal  exam repair ()? and mass excision (). She has history of breast cancer, she is on tamoxifen 20 mg every other day. She underwent a bilateral mastectomy on 3/26/20. She is ER and CO positive. She was in her usual state of health until she presented  to [facility] ED on 24 for flu like symptoms, and abdominal pain. She underwent a CTAP on that showed left adnexal cystic lesion measuring 5.4 x 3.9 cm in the axial plane, upper pelvic mesenteric fluid collection which measures 2.2 x 5.1 cm in the axial  plane. She underwent a followup pelvic US that showed uterus measures 9.43 x 12.16 x 10.34 cm. Uterine fibroids, fundal fibroid measuring 5.7 x 3.8 x 5.0 cm. EEC measures 0.94 cm. R ovary measures 3.62 x 1.88 x 3.4 cm. L ovary measures 6.78 x 5.64 x  6.24 cm. L ovarian 6.8 x 5.6 x 6.2 cm complex cystic lesion with lowlevel echoes most consistent with a hemorrhagic cyst. Her  was 166. She underwent a pelvic US on 24 that showed uterus measures 10.93 x 13.63 x 7.76 cm. Multiple heterogeneous solid lesions in the fundus and corpus measuring up to 4.7 cm. Complex elongated fluid collection adjacent the fundus. EEC measures 1.45 cm. R ovary measures 4.05 x 2.45 x 3.73 cm. Complex fluid collection in close proximity to the R ovary. L ovary measures 5.36 x 3.61 x 4.86 cm. Prominent heterogeneous L ovary. No FF seen. Due to these findings, she has been referred to me for further evaluation.     She was seen for consultation on 24. Her first menstrual period  was at 12, they are irregular. Patient reported personal history of estrogen receptor positive breast cancer and she has been through bilateral mastectomy and chemotherapy. Since that  time she has struggled with fatigue. She is BRCA negative. She has had to be on tamoxifen for years because of this breast cancer diagnosis and does not tolerate the tamoxifen well. She now is having significant pain in her lower pelvis and feels her uterus pressing on many things. Her menses started when she was 12 years old they typically are 4 to 5 days long and can be very heavy. She has a lot of pain with her menses and has for a long time. Given she would like to be off of tamoxifen and her  problematic menses in addition to her pain she is interested in discussing surgical management. She also has a history of anemia due to her heavy bleeding. Understands that she has an ovarian mass which she attributes to her pain. Reported that she has a normal appetite but has had some weight loss in the past couple of weeks. 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 Last pap was years ago, no h/o abnormal pap. No pain with intercourse. No urinary or bowel complaints.     Due to the above it was recommended she undergo surgical intervention. She was admitted and underwent a robotic assisted hysterectomy, bilateral salpingo oophorectomy, appendectomy, bilateral ureterolysis, bilateral stent placement with no complications. She was admitted overnight due to lethargy and slow waking up from anesthesia. On POD#1 she was alert and oriented. She has had this happen before with anesthesia. She was tolerating PO, ambulating, pain controlled and voiding spontaneously.     Therefore, she is discharged in good and stable condition to home with close outpatient follow-up.    The patient met 2-midnight criteria for an inpatient stay at the time of discharge.    Discharge Date  8/23/2024    FOLLOW UP ITEMS POST DISCHARGE  Follow up in  6 weeks for stent removal       DISCHARGE DIAGNOSES  Endometriosis   Fibroids   Hx breast cancer   Palpitations,   Pernicious anemia      FOLLOW UP  Future Appointments   Date Time Provider Department Center   2/12/2025  8:00 AM Paulina Chao P.A.-C. [facility] None     Evans Mann M.D.  [address]  [place] [id number]  [phone]    Follow up on 10/16/2024  Post Operative appointment at 10:15 am      MEDICATIONS ON DISCHARGE     Medication List        CHANGE how you take these medications        Instructions   EPINEPHrine 0.3 MG/0.3ML Sosy  What changed:   how much to take  how to take this  when to take this  reasons to take this   Inject the contents of the epipen into the thigh, hold for 3 seconds and release from thigh as needed for anaphylaxis.     hydrocortisone 25 MG Supp  What changed:   how much to take  how to take this  when to take this  reasons to take this  Commonly known as: Anusol-HC   Anucort-HC 25 mg suppository  INSERT 1 SUPPOSITORY RECTALLY ONCE DAILY AS NEEDED            CONTINUE taking these medications        Instructions   CALCIUM-VITAMIN D PO   Take 500 mg by mouth every day.  Dose: 500 mg     folic acid 1 MG Tabs  Commonly known as: Folvite   Take 1 mg by mouth every day.  Dose: 1 mg     lidocaine 5 % Ptch  Commonly known as: Lidoderm   Place 1 Patch on the skin every 12 hours as needed (pain on upper and lower back).   Dose: 1 Patch     MULTI-VITAMIN DAILY PO   Take 1 Tablet by mouth every day.  Dose: 1 Tablet     ondansetron 4 MG Tbdp  Commonly known as: Zofran ODT   Take 1 Tablet by mouth every 6 hours as needed for Nausea/Vomiting.  Dose: 4 mg     SODIUM FLUORIDE (DENTAL GEL) 1.1 % Gel   Apply 1 Application to teeth 2 times a day.  Dose: 1 Application     Solriamfetol HCl 150 MG Tabs   Take 150 mg by mouth every day for 180 days.  Dose: 150 mg     tamoxifen 20 MG tablet  Commonly known as: Nolvadex   Take 20 mg by mouth every 48 hours.  Dose: 20 mg     tretinoin 0.1 %  cream  Commonly known as: Retin-A   Apply 1 Application topically every evening as needed (facial).  Dose: 1 Application     VITAMIN B 12 PO   Take 1,000 mcg by mouth every day.  Dose: 1,000 mcg              Allergies  Allergies   Allergen Reactions    Shellfish Allergy Hives    Meperidine Unspecified     PT Declines Lowers seizure threshold; does not want this medication    Nickel Itching     Rash jewelry reaction    Trazodone Unspecified     Terrible headaches       DIET  Orders Placed This Encounter   Procedures    Diet Order Diet: Regular     Standing Status:   Standing     Number of Occurrences:   1     Order Specific Question:   Diet:     Answer:   Regular [1]       ACTIVITY  As tolerated.  10-lb lifting restriction  Pelvic rest     CONSULTATIONS  None     PROCEDURES  robotic assisted hysterectomy, bilateral salpingo oophorectomy, appendectomy, bilateral ureterolysis, bilateral stent placement    LABORATORY  Lab Results   Component Value Date    SODIUM 138 08/23/2024    POTASSIUM 3.8 08/23/2024    CHLORIDE 105 08/23/2024    CO2 20 08/23/2024    GLUCOSE 141 (H) 08/23/2024    BUN 11 08/23/2024    CREATININE 0.57 08/23/2024    CREATININE 0.80 03/16/2010    GLOMRATE >59 03/16/2010        Lab Results   Component Value Date    WBC 15.5 (H) 08/23/2024    WBC 4.4 03/16/2010    HEMOGLOBIN 11.5 (L) 08/23/2024    HEMATOCRIT 34.7 (L) 08/23/2024    PLATELETCT 337 08/23/2024        Total time of the discharge process exceeds 25 minutes.

## 2024-08-23 NOTE — DISCHARGE INSTRUCTIONS
HOME CARE INSTRUCTIONS  Call [phone] in about two weeks to see if the specimen is available to  from pathology. Give them your name and medical record number which is: [id number]    1. No heavy lifting greater than 10 pounds for a minimum 6 weeks    2. Nothing in the vagina (ie no tampons, douching, intercourse) for a minimum of 6 weeks    3. No driving while taking narcotics    4. Call our office [phone] if you develop any fevers, chills, nausea/vomiting, heavy vaginal bleeding, or redness, tenderness, and/or  drainage from your wound, if you have persistent watery discharge while ambulating or stool draining from the vagina.    5. Showering with warm water is ok, no bathing or swimming    6. You may remove wound dressing on postoperative day 1, and place lose bandages for two weeks    7. You may have vaginal spotting or light bleeding which is normal.    8. If you have not had a bowel movement for 2 days, please take over the counter Milk of Magnesium, 1 tablespoon every 4 hours. After 4  doses and if you still have not had a bowel movement, please call your doctor.    9. You may eat a soft diet, such as soup, liquid, for day #1 and if tolerating you may resume your regular diet.       Given Tylenol 1gram and  Gabapentin 300mg at 12:55PM.    A follow-up appointment should be arranged with your doctor Evans Mann in [phone] ; call to schedule.    You should CALL YOUR PHYSICIAN if you develop:  Fever greater than 101 degrees F.  Pain not relieved by medication, or persistent nausea or vomiting.  Excessive bleeding (blood soaking through dressing) or unexpected drainage from the wound.  Extreme redness or swelling around the incision site, drainage of pus or foul smelling drainage.  Inability to urinate or empty your bladder within 8 hours.  Problems with breathing or chest pain.    You should call [phone] if you develop problems with breathing or chest pain.  If you are unable to contact your doctor or  surgical center, you should go to the nearest emergency room or urgent care center.  Physician's telephone #: [phone]     MILD FLU-LIKE SYMPTOMS ARE NORMAL.  YOU MAY EXPERIENCE GENERALIZED MUSCLE ACHES, THROAT IRRITATION, HEADACHE AND/OR SOME NAUSEA.    If any questions arise, call your doctor.  If your doctor is not available, please feel free to call the Surgical Center at [phone].  The Center is open Monday through Friday from 7AM to 7PM.      A registered nurse may call you a few days after your surgery to see how you are doing after your procedure.    You may also receive a survey in the mail within the next two weeks and we ask that you take a few moments to complete the survey and return it to us.  Our goal is to provide you with very good care and we value your comments.     Depression / Suicide Risk    As you are discharged from this [facility] facility, it is important to learn how to keep safe from harming yourself.    Recognize the warning signs:  Abrupt changes in personality, positive or negative- including increase in energy   Giving away possessions  Change in eating patterns- significant weight changes-  positive or negative  Change in sleeping patterns- unable to sleep or sleeping all the time   Unwillingness or inability to communicate  Depression  Unusual sadness, discouragement and loneliness  Talk of wanting to die  Neglect of personal appearance   Rebelliousness- reckless behavior  Withdrawal from people/activities they love  Confusion- inability to concentrate     If you or a loved one observes any of these behaviors or has concerns about self-harm, here's what you can do:  Talk about it- your feelings and reasons for harming yourself  Remove any means that you might use to hurt yourself (examples: pills, rope, extension cords, firearm)  Get professional help from the community (Mental Health, Substance Abuse, psychological counseling)  Do not be alone:Call your Safe Contact-  someone whom you trust who will be there for you.  Call your local CRISIS HOTLINE 395-3214 or [phone]  Call your local Children's Mobile Crisis Response Team [place] (116) 395-3691 or www.[employer]  Call the toll free National Suicide Prevention Hotlines   National Suicide Prevention Lifeline 038-748-RSTA (1293)  [facility] 800-QHNOABZ (343-7968)    I acknowledge receipt and understanding of these Home Care instructions.

## 2024-08-23 NOTE — OR SURGEON
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    Anesthesiologist/Type of Anesthesia:  Anesthesiologist: Cole Michel M.D.; Cole Hamilton M.D./General    Surgical Staff:  Assistant: Marnie Darby P.A.-C.  Circulator: Janina Zepeda R.N.  Relief Circulator: Nanci Chandra R.N.  Scrub Person: Ruchi Srivastava; Josefa Pastor  Count [place]: Ericka Banuelos R.N.    Specimens removed if any:  ID Type Source Tests Collected by Time Destination   A : CERVIX , UTERUS , BILATERAL FALLOPIAN TUBES AND OVARIES, CULDESAC PERITONEUM Tissue Abdominal PATHOLOGY SPECIMEN Evans Mann M.D. 8/22/2024  4:17 PM    B : APPENDIX Tissue Appendix PATHOLOGY SPECIMEN Evans Mann M.D. 8/22/2024  4:25 PM        Estimated Blood Loss: 250 cc    Findings: No evidence of ascites.  Normal right left diaphragm.  Liver capsule smooth.  Stomach appear grossly normal.  Abdominal peritoneal surfaces were markable.  Omentum appeared grossly normal.    In the pelvis multiple fibroids were noted.  There was a complete cul-de-sac obliteration.  The anterior wall of the sigmoid colon was completely densely adherent to the posterior aspect of the uterus with endometriosis on the surface of the anterior sigmoid colon resulting in frozen pelvis with complete cul-de-sac obliteration.  Bilateral endometriomas with adherent to the sigmoid colon in addition the  endometriosis extended out to the pelvic sidewall peritoneum and the uterosacral ligament the left was greater than the right.  The endometriosis extended out to the ureteric tunnel to the parametria along the uterine artery there was dense tissue noted there was quite vascular.  Again the left side was much more fibrotic and pronounced and extensive involvement in the right nevertheless both ureters were impinged by this endometriosis extension out to the parametria thus necessitating a bilateral extensive ureterolysis.  This was performed and after the ureterolysis was performed there was no obvious injury to the ureters however because of the extensive ureterolysis that was performed to level of the ureterovesical junction I elected to place a ureteral stents.    In addition evaluation of the appendix the appendix was quite large and dilated particular at the base.  Is clinically consistent with endometriosis thus we elected to remove the appendix.    Complications: none        8/22/2024 5:52 PM Evans Mann M.D.

## 2024-08-27 ENCOUNTER — HOSPITAL ENCOUNTER (OUTPATIENT)
Dept: LAB | Facility: MEDICAL CENTER | Age: 44
End: 2024-08-27
Attending: SPECIALIST
Payer: COMMERCIAL

## 2024-08-27 LAB
ALBUMIN SERPL BCP-MCNC: 3.2 G/DL (ref 3.2–4.9)
ALBUMIN/GLOB SERPL: 1.1 G/DL
ALP SERPL-CCNC: 62 U/L (ref 30–99)
ALT SERPL-CCNC: 13 U/L (ref 2–50)
ANION GAP SERPL CALC-SCNC: 11 MMOL/L (ref 7–16)
AST SERPL-CCNC: 16 U/L (ref 12–45)
BASOPHILS # BLD AUTO: 0.2 % (ref 0–1.8)
BASOPHILS # BLD: 0.03 K/UL (ref 0–0.12)
BILIRUB SERPL-MCNC: 0.4 MG/DL (ref 0.1–1.5)
BUN SERPL-MCNC: 7 MG/DL (ref 8–22)
CALCIUM ALBUM COR SERPL-MCNC: 9.3 MG/DL (ref 8.5–10.5)
CALCIUM SERPL-MCNC: 8.7 MG/DL (ref 8.5–10.5)
CHLORIDE SERPL-SCNC: 103 MMOL/L (ref 96–112)
CO2 SERPL-SCNC: 26 MMOL/L (ref 20–33)
CREAT SERPL-MCNC: 0.65 MG/DL (ref 0.5–1.4)
EOSINOPHIL # BLD AUTO: 0.05 K/UL (ref 0–0.51)
EOSINOPHIL NFR BLD: 0.4 % (ref 0–6.9)
ERYTHROCYTE [DISTWIDTH] IN BLOOD BY AUTOMATED COUNT: 50.8 FL (ref 35.9–50)
GFR SERPLBLD CREATININE-BSD FMLA CKD-EPI: 110 ML/MIN/1.73 M 2
GLOBULIN SER CALC-MCNC: 2.9 G/DL (ref 1.9–3.5)
GLUCOSE SERPL-MCNC: 108 MG/DL (ref 65–99)
HCT VFR BLD AUTO: 31.1 % (ref 37–47)
HGB BLD-MCNC: 9.9 G/DL (ref 12–16)
IMM GRANULOCYTES # BLD AUTO: 0.11 K/UL (ref 0–0.11)
IMM GRANULOCYTES NFR BLD AUTO: 0.8 % (ref 0–0.9)
LYMPHOCYTES # BLD AUTO: 0.94 K/UL (ref 1–4.8)
LYMPHOCYTES NFR BLD: 6.8 % (ref 22–41)
MCH RBC QN AUTO: 34 PG (ref 27–33)
MCHC RBC AUTO-ENTMCNC: 31.8 G/DL (ref 32.2–35.5)
MCV RBC AUTO: 106.9 FL (ref 81.4–97.8)
MONOCYTES # BLD AUTO: 0.91 K/UL (ref 0–0.85)
MONOCYTES NFR BLD AUTO: 6.5 % (ref 0–13.4)
NEUTROPHILS # BLD AUTO: 11.88 K/UL (ref 1.82–7.42)
NEUTROPHILS NFR BLD: 85.3 % (ref 44–72)
NRBC # BLD AUTO: 0 K/UL
NRBC BLD-RTO: 0 /100 WBC (ref 0–0.2)
PLATELET # BLD AUTO: 370 K/UL (ref 164–446)
PMV BLD AUTO: 9.4 FL (ref 9–12.9)
POTASSIUM SERPL-SCNC: 3.8 MMOL/L (ref 3.6–5.5)
PROT SERPL-MCNC: 6.1 G/DL (ref 6–8.2)
RBC # BLD AUTO: 2.91 M/UL (ref 4.2–5.4)
SODIUM SERPL-SCNC: 140 MMOL/L (ref 135–145)
WBC # BLD AUTO: 13.9 K/UL (ref 4.8–10.8)

## 2024-08-27 PROCEDURE — 80053 COMPREHEN METABOLIC PANEL: CPT

## 2024-08-27 PROCEDURE — 36415 COLL VENOUS BLD VENIPUNCTURE: CPT

## 2024-08-27 PROCEDURE — 85025 COMPLETE CBC W/AUTO DIFF WBC: CPT

## 2024-11-07 ENCOUNTER — HOSPITAL ENCOUNTER (OUTPATIENT)
Dept: RADIOLOGY | Facility: MEDICAL CENTER | Age: 44
End: 2024-11-07
Attending: INTERNAL MEDICINE
Payer: COMMERCIAL

## 2024-11-07 DIAGNOSIS — Z91.89 AT RISK FOR BONE DENSITY LOSS: ICD-10-CM

## 2024-11-07 DIAGNOSIS — Z79.811 AROMATASE INHIBITOR USE: ICD-10-CM

## 2024-11-07 PROCEDURE — 77080 DXA BONE DENSITY AXIAL: CPT

## 2025-02-12 ENCOUNTER — OFFICE VISIT (OUTPATIENT)
Dept: SLEEP MEDICINE | Facility: MEDICAL CENTER | Age: 45
End: 2025-02-12
Attending: PHYSICIAN ASSISTANT
Payer: COMMERCIAL

## 2025-02-12 VITALS
BODY MASS INDEX: 25.71 KG/M2 | HEIGHT: 67 IN | SYSTOLIC BLOOD PRESSURE: 114 MMHG | WEIGHT: 163.8 LBS | HEART RATE: 73 BPM | DIASTOLIC BLOOD PRESSURE: 72 MMHG | OXYGEN SATURATION: 96 % | RESPIRATION RATE: 14 BRPM

## 2025-02-12 DIAGNOSIS — G47.10 HYPERSOMNOLENCE DISORDER, PERSISTENT: ICD-10-CM

## 2025-02-12 DIAGNOSIS — G47.33 OSA (OBSTRUCTIVE SLEEP APNEA): ICD-10-CM

## 2025-02-12 PROCEDURE — 3074F SYST BP LT 130 MM HG: CPT | Performed by: PHYSICIAN ASSISTANT

## 2025-02-12 PROCEDURE — 99213 OFFICE O/P EST LOW 20 MIN: CPT | Performed by: PHYSICIAN ASSISTANT

## 2025-02-12 PROCEDURE — 3078F DIAST BP <80 MM HG: CPT | Performed by: PHYSICIAN ASSISTANT

## 2025-02-12 RX ORDER — SOLRIAMFETOL 150 MG/1
150 TABLET, FILM COATED ORAL DAILY
Qty: 30 TABLET | Refills: 5 | Status: SHIPPED | OUTPATIENT
Start: 2025-02-12 | End: 2025-08-11

## 2025-02-12 ASSESSMENT — ENCOUNTER SYMPTOMS
ORTHOPNEA: 0
COUGH: 0
PALPITATIONS: 0
SORE THROAT: 0
DIZZINESS: 1
WEIGHT LOSS: 0
SPUTUM PRODUCTION: 0
CHILLS: 0
HEADACHES: 1
FEVER: 0
SINUS PAIN: 0
DIAPHORESIS: 1
WHEEZING: 0
HEARTBURN: 0
INSOMNIA: 1
TREMORS: 0
SHORTNESS OF BREATH: 0

## 2025-02-12 ASSESSMENT — FIBROSIS 4 INDEX: FIB4 SCORE: 0.54

## 2025-02-12 NOTE — PROGRESS NOTES
"Chief Complaint   Patient presents with    Apnea     Last Office Visit 8/12/24 with Paulina Chao P.A.-C.    Dx: Hypersomnolence disorder, CASSIE    Settings: auto CPAP @5-15 cm H20 pressure    Set up: 4/12/22         HPI:  Tiarra Chavez is a 44 y.o. year old female here today for follow-up on obstructive sleep apnea and excessive daytime sleepiness .  Patient last seen in clinic on 8/12/2024 by KIRAN Montilla.  Previously evaluated by Dr. Douglas Phelps 5/22/2023.    Past Medical History: Generalized anxiety, depression, adjustment disorder, pernicious anemia, palpitations, right-sided breast cancer, allergic rhinitis, palpitations, hysterectomy August 2024.  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    Vitals:  /72 (BP Location: Left arm, Patient Position: Sitting, BP Cuff Size: Adult)   Pulse 73   Resp 14   Ht 1.689 m (5' 6.5\")   Wt 74.3 kg (163 lb 12.8 oz)   SpO2 96% BMI of 26.04 kg/m².    Recent Imaging: Chest x-ray 6/28/2024 no acute cardiopulmonary abnormalities.    Echocardiogram obtained 11/5/2020 demonstrated normal left ventricular chamber size, wall thickness, systolic and diastolic function.  LVEF estimated 65%.  Normal right ventricular size and systolic function, trace tricuspid regurgitation with estimated RVSP of 25 mmHg.      Currently using  Resmed auto CPAP @5-15 cm H20 pressure; compliance reviewed for 1/14/2025 through 2/12/2025, days used 30/30, average daily usage 4 hours 13 minutes, 63% of days greater than or equal to 4 hours, mask leak at 5.2 LPM at 95th percentile, AHI 0.9 per hour.  See media for full report.    Device obtained April 2022  DME provider [facility]  Mask interface hybrid fullface mask    Home sleep study performed 1/6/2022 on a ResMed apnea link device demonstrated low O2 sat of 82%, average sat 893%.  With sats less than or equal to 88% for 8 minutes.  Snoring episodes recorded at 438.  Per pulmonologist interpretation study consistent with mild to moderate " obstructive sleep apnea, GINA of 13.2.     Home sleep test WatchPAT obtained 6/6/2023 demonstrated per pulmonologist report no significant CASSIE H with pAHI of 3.8/h, 4.4/h supine.  Low O2 sats 91% with baseline 95%.  Snoring reported as exceeding 45 dB or so for 11.8 minutes. Patient reports not sleeping well for the study.     Polysomnogram study obtained 7/10/2023 demonstrated mild sleep apnea of 5.89/h increasing to 17.8/h during REM sleep and 7.65/h supine.  Patient with low O2 sat of 91% with 0 minutes less than or equal to 88%.  Patient denied need to split-night criteria secondary to reduced sleep efficiency with only 1 non-REM REM cycle and awake time period of 2-1/2 hours during the middle of the night.    Sleep schedule goes to bed 6-8 p.m. and wakens to-4 AM, may sleep an additional 2 hours or simply rest, may get up once or twice during the night and return to sleep.   Symptoms include improved fatigue and improved sleep quality when taking Sunosi.  Denies morning headaches with manageable daytime somnolence when on medication.    [place] Sleepiness Scale 18/24 on 8/23/2024      Review of Systems   Constitutional:  Positive for diaphoresis (worsening hot flashes). Negative for chills, fever, malaise/fatigue and weight loss.   HENT:  Negative for congestion, hearing loss, nosebleeds, sinus pain, sore throat and tinnitus.    Eyes:         Presc glasses   Respiratory:  Negative for cough, sputum production, shortness of breath and wheezing.    Cardiovascular:  Positive for chest pain (felt to be muscular) and leg swelling (trace). Negative for palpitations and orthopnea.   Gastrointestinal:  Negative for heartburn.        No dentures, no missing teeth, wears  for grinding, no swallowing issues    Neurological:  Positive for dizziness (with cardio) and headaches (sometimes severe). Negative for tremors.   Psychiatric/Behavioral:  The patient has insomnia (staying asleep mostly).        Past Medical  "History:   Diagnosis Date    Allergic rhinitis 04/07/2011    Cancer (HCC) 2020    breast right    Headache(784.0)     When in school, not a problem now.  With sleep deprivation.    Palpitations     Pernicious anemia     Seizure (HCC)     occured after mastectomy surgery    Sleep apnea     uses cpap    Snoring     Urinary incontinence     minor       Past Surgical History:   Procedure Laterality Date    HYSTERECTOMY ROBOTIC XI  8/22/2024    Procedure: ROBOTIC ASSISTED TOTAL LAPAROSCOPIC HYSTERECTOMY, BILATERAL SALPINGOOOPHORECTOMY, LYSIS OF ADHESIONS , CYSTOSCOPY WITH BILATERAL URETERAL STENTS;  Surgeon: Evans Mann M.D.;  Location: SURGERY [facility];  Service: Gyn Robotic    APPENDECTOMY  8/22/2024    Procedure: APPENDECTOMY;  Surgeon: Evans Mann M.D.;  Location: SURGERY [facility];  Service: Gyn Robotic    MASS EXCISION GENERAL Left 1/10/2022    Procedure: EXCISION, MASS - CHEST WALL;  Surgeon: Sophy Holbrook M.D.;  Location: SURGERY [facility];  Service: General    PB MASTECTOMY, SIMPLE, COMPLETE Bilateral 3/26/2020    Procedure: MASTECTOMY;  Surgeon: Sophy Holbrook M.D.;  Location: [facility];  Service: General    NODE BIOPSY SENTINEL Right 3/26/2020    Procedure: BIOPSY, LYMPH NODE, SENTINEL;  Surgeon: Sophy Holbrook M.D.;  Location: [facility];  Service: General    BREAST BIOPSY Right 01/06/2020    Rt Breast Bx    VAGINAL PERINEAL EXAM REPAIR  5/14/2012    Performed by HUSSAIN RANGEL at LABOR AND DELIVERY    LUMPECTOMY  2001    \"[facility]    OTHER Left 1981    laceration repair  left leg       Family History   Problem Relation Age of Onset    Hypertension Mother     Thyroid Mother         Hypothyroid    Hyperlipidemia Father     Cancer Father         T cell lymphoma    Cancer Paternal Uncle         lymphoma    Heart Disease Maternal Grandfather         MI 38 yo, unknown RF.       Social History     Socioeconomic History    Marital status: Single     Spouse name: " Not on file    Number of children: Not on file    Years of education: Not on file    Highest education level: Not on file   Occupational History     Comment: pharmacict   Tobacco Use    Smoking status: Never    Smokeless tobacco: Never   Vaping Use    Vaping status: Never Used   Substance and Sexual Activity    Alcohol use: Never    Drug use: Not Currently     Types: Marijuana, Oral     Comment: couple times a week     Sexual activity: Yes     Partners: Male     Birth control/protection: Condom, Pill   Other Topics Concern    Not on file   Social History Narrative    Not on file     Social Drivers of Health     Financial Resource Strain: Not on file   Food Insecurity: No Food Insecurity (8/23/2024)    Hunger Vital Sign     Worried About Running Out of Food in the Last Year: Never true     Ran Out of Food in the Last Year: Never true   Transportation Needs: No Transportation Needs (8/23/2024)    PRAPARE - Transportation     Lack of Transportation (Medical): No     Lack of Transportation (Non-Medical): No   Physical Activity: Not on file   Stress: Not on file   Social Connections: Not on file   Intimate Partner Violence: Not At Risk (8/23/2024)    Humiliation, Afraid, Rape, and Kick questionnaire     Fear of Current or Ex-Partner: No     Emotionally Abused: No     Physically Abused: No     Sexually Abused: No   Housing Stability: Low Risk  (8/23/2024)    Housing Stability Vital Sign     Unable to Pay for Housing in the Last Year: No     Number of Times Moved in the Last Year: 0     Homeless in the Last Year: No       Allergies as of 02/12/2025 - Reviewed 02/12/2025   Allergen Reaction Noted    Shellfish allergy Hives 03/15/2010    Meperidine Unspecified 07/30/2024    Nickel Itching 03/24/2020    Trazodone Unspecified 09/26/2022          Current medications as of today   Current Outpatient Medications   Medication Sig Dispense Refill    SODIUM FLUORIDE, DENTAL GEL, 1.1 % Gel Apply 1 Application to teeth 2 times a day.       tretinoin (RETIN-A) 0.1 % cream Apply 1 Application topically every evening as needed (facial).      ondansetron (ZOFRAN ODT) 4 MG TABLET DISPERSIBLE Take 1 Tablet by mouth every 6 hours as needed for Nausea/Vomiting. 15 Tablet 0    EPINEPHrine 0.3 MG/0.3ML Solution Prefilled Syringe Inject the contents of the epipen into the thigh, hold for 3 seconds and release from thigh as needed for anaphylaxis. (Patient taking differently: Inject 0.3 mg into the shoulder, thigh, or buttocks as needed (anaphylactic reaction). Inject the contents of the epipen into the thigh, hold for 3 seconds and release from thigh as needed for anaphylaxis.) 1 Each 0    lidocaine (LIDODERM) 5 % Patch Place 1 Patch on the skin every 12 hours as needed (pain on upper and lower back).         CALCIUM-VITAMIN D PO Take 500 mg by mouth every day.      hydrocortisone (ANUSOL-HC) 25 MG Suppos Anucort-HC 25 mg suppository  INSERT 1 SUPPOSITORY RECTALLY ONCE DAILY AS NEEDED (Patient taking differently: Insert 25 mg into the rectum 1 time a day as needed (hemorrhoids). Anucort-HC 25 mg suppository  INSERT 1 SUPPOSITORY RECTALLY ONCE DAILY AS NEEDED) 30 Suppository 0    tamoxifen (NOLVADEX) 20 MG tablet Take 20 mg by mouth every 48 hours.      Multiple Vitamin (MULTI-VITAMIN DAILY PO) Take 1 Tablet by mouth every day.      Cyanocobalamin (VITAMIN B 12 PO) Take 1,000 mcg by mouth every day.      folic acid (FOLVITE) 1 MG TABS Take 1 mg by mouth every day.       No current facility-administered medications for this visit.         Physical Exam:   Gen:           Alert and oriented, No apparent distress. Mood and affect appropriate, normal interaction with examiner.   Hearing:     Grossly intact.  Nose:          Normal, no lesions or deformities.  Dentition:    Good dentition.   Oropharynx:   Tongue normal, posterior pharynx without erythema or exudate.  Mallampati Classification: II  Neck:        Supple, trachea midline, no masses.  Respiratory  Effort: No intercostal retractions or use of accessory muscles.   Gait and Station: Normal.  Digits and Nails: No clubbing, cyanosis, petechiae, or nodes.   Skin:        No rashes, lesions or ulcers noted.               Ext:           No cyanosis or edema.      Immunizations:  Flu: 10/5/2024  PCV 20: 8/1/2022  Pfizer booster SARS-CoV-2 vaccine: 9/15/2022  Moderna SARS CoV2 Vaccine: 8/15/2021, 2/2/2021, 1/5/2021  COVID-19 mRNA vaccine: 10/5/2024, 10/14/2023    Assessment / Plan:  1. Hypersomnolence disorder, persistent  - Solriamfetol HCl (SUNOSI) 150 MG Tab; Take 150 mg by mouth every day for 180 days.  Dispense: 30 Tablet; Refill: 5  - DME Mask and Supplies    Continued benefit with Sunosi use, continued need to manage daytime somnolence noted.  Updated prescription.  Patient to contact clinic if experiencing delay in refill availability, check for sample availability at that time.  Will continue to require 6-month follow-up to reassess.    2. CASSIE (obstructive sleep apnea)    Reviewed compliance, demonstrating use and benefit.  Send updated orders for mask and supplies.  Work on increasing daily usage times.  Reminded to use distilled water only in humidifier chamber with fresh fill daily.  Reviewed equipment cleaning as well as equipment replacement schedule.  Follow-up in 6 months sooner if needed.      Follow-up:   Return in about 6 months (around 8/12/2025) for Return with Paulina Chao PA-C.    Please note that this dictation was created using voice recognition software. I have made every reasonable attempt to correct obvious errors, but it is possible there are errors of grammar and possibly content that I did not discover before finalizing the note.

## 2025-02-12 NOTE — PATIENT INSTRUCTIONS
1-reviewed compliance  2-demonstrating use and benefit  3-updated order for mask and supplies  4-increased daily usage times  5-continue need for sunosi to manage daytime somnolence  6-updated prescription   7-contact clinic if new prior auth required for sample availability  8-follow up in 6 months sooner if needed

## 2025-03-04 ENCOUNTER — APPOINTMENT (OUTPATIENT)
Dept: MEDICAL GROUP | Facility: CLINIC | Age: 45
End: 2025-03-04
Payer: COMMERCIAL

## 2025-03-04 VITALS
OXYGEN SATURATION: 97 % | TEMPERATURE: 97.2 F | HEIGHT: 69 IN | SYSTOLIC BLOOD PRESSURE: 128 MMHG | HEART RATE: 65 BPM | DIASTOLIC BLOOD PRESSURE: 81 MMHG | WEIGHT: 163.5 LBS | BODY MASS INDEX: 24.22 KG/M2

## 2025-03-04 DIAGNOSIS — R21 RASH: ICD-10-CM

## 2025-03-04 DIAGNOSIS — Z11.59 ENCOUNTER FOR HEPATITIS C SCREENING TEST FOR LOW RISK PATIENT: ICD-10-CM

## 2025-03-04 DIAGNOSIS — D64.9 ANEMIA, UNSPECIFIED TYPE: ICD-10-CM

## 2025-03-04 DIAGNOSIS — Z13.220 LIPID SCREENING: ICD-10-CM

## 2025-03-04 PROCEDURE — 99203 OFFICE O/P NEW LOW 30 MIN: CPT | Mod: GE | Performed by: BEHAVIOR ANALYST

## 2025-03-04 PROCEDURE — 3074F SYST BP LT 130 MM HG: CPT | Mod: GE | Performed by: BEHAVIOR ANALYST

## 2025-03-04 PROCEDURE — 3079F DIAST BP 80-89 MM HG: CPT | Mod: GE | Performed by: BEHAVIOR ANALYST

## 2025-03-04 RX ORDER — ANASTROZOLE 1 MG/1
1 TABLET ORAL DAILY
COMMUNITY
Start: 2025-02-21

## 2025-03-04 ASSESSMENT — FIBROSIS 4 INDEX: FIB4 SCORE: 0.54

## 2025-03-04 NOTE — PROGRESS NOTES
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     She c/o feeling fatigue and generalized aches and pains.   She does report anxiety and f/u w/ her therapist.     A/P:  #anemia  #fatigue  -Patient was anemic August 2024 with a hemoglobin of 9.9 and MCV of 106.9  -She states she does not think her sx are related to depression and is no interested in pharmacotherapy, she states she believes her sx to be attributed to her MARQUIS; I agree with her tameka given how much she has been through in the past few years with all her cancer thx; we discussed that some mental exhaustion could be a contributing factor  -I will at this time repeat cbc w/o, and f/u after labs     #hx of breast cancer  -f/u w/ onc  -cont anastrozole    #hx of anaphylaxis  -shellfish allergy  -refill epi pen    #health maintenance  -lipid screening  -hep c screen    All plans of care were fully discussed with the patient and shared-decision making was utilized to conclude best course of actions in patient's medical management.    ROS: See HPI.     Objective:     Exam:  /81 (BP Location: Left arm, Patient Position: Sitting, BP Cuff Size: Adult)   Pulse 65   Temp 36.2 °C (97.2 °F) (Temporal)   Ht 1.753 m (5' 9\")   Wt 74.2 kg (163 lb 8 oz)   SpO2 97%   BMI 24.14 kg/m²  Body mass index is 24.14 kg/m².    Physical Exam:  General: Pt resting in NAD, cooperative   Skin:  No cyanosis or jaundice   HEENT: NC/AT. EOMI. No conjunctival injection or sclera icterus.   Lungs:  CTAB, good air movement. Non-labored.   Cardiovascular:  S1/S2 RRR   Abdomen:  Abdomen is soft, non-tender, non-distended, +BS  Extremities:  No LE edema   CNS:  No gross focal neurologic deficits  Psych: Appropriate mood and affect "         Assessment & Plan:     See A/P under Subjective Section above    Problem List Items Addressed This Visit    None  Visit Diagnoses         Encounter for hepatitis C screening test for low risk patient        Relevant Orders    HEP C VIRUS ANTIBODY      Rash        Relevant Medications    EPINEPHrine 0.3 MG/0.3ML Solution Prefilled Syringe      Lipid screening        Relevant Orders    Lipid Profile      Anemia, unspecified type        Relevant Orders    CBC WITHOUT DIFFERENTIAL

## 2025-03-05 ENCOUNTER — HOSPITAL ENCOUNTER (OUTPATIENT)
Facility: MEDICAL CENTER | Age: 45
End: 2025-03-05
Attending: BEHAVIOR ANALYST
Payer: COMMERCIAL

## 2025-03-05 DIAGNOSIS — D64.9 ANEMIA, UNSPECIFIED TYPE: ICD-10-CM

## 2025-03-05 DIAGNOSIS — Z11.59 ENCOUNTER FOR HEPATITIS C SCREENING TEST FOR LOW RISK PATIENT: ICD-10-CM

## 2025-03-05 DIAGNOSIS — Z13.220 LIPID SCREENING: ICD-10-CM

## 2025-03-05 LAB
CHOLEST SERPL-MCNC: 196 MG/DL (ref 100–199)
ERYTHROCYTE [DISTWIDTH] IN BLOOD BY AUTOMATED COUNT: 47.8 FL (ref 35.9–50)
FASTING STATUS PATIENT QL REPORTED: NORMAL
HCT VFR BLD AUTO: 42.6 % (ref 37–47)
HCV AB SER QL: NORMAL
HDLC SERPL-MCNC: 52 MG/DL
HGB BLD-MCNC: 14.3 G/DL (ref 12–16)
LDLC SERPL CALC-MCNC: 110 MG/DL
MCH RBC QN AUTO: 33.9 PG (ref 27–33)
MCHC RBC AUTO-ENTMCNC: 33.6 G/DL (ref 32.2–35.5)
MCV RBC AUTO: 100.9 FL (ref 81.4–97.8)
PLATELET # BLD AUTO: 382 K/UL (ref 164–446)
PMV BLD AUTO: 10 FL (ref 9–12.9)
RBC # BLD AUTO: 4.22 M/UL (ref 4.2–5.4)
TRIGL SERPL-MCNC: 168 MG/DL (ref 0–149)
WBC # BLD AUTO: 5 K/UL (ref 4.8–10.8)

## 2025-03-05 PROCEDURE — 80061 LIPID PANEL: CPT

## 2025-03-05 PROCEDURE — 36415 COLL VENOUS BLD VENIPUNCTURE: CPT

## 2025-03-05 PROCEDURE — 86803 HEPATITIS C AB TEST: CPT

## 2025-03-05 PROCEDURE — 85027 COMPLETE CBC AUTOMATED: CPT

## 2025-03-10 ENCOUNTER — RESULTS FOLLOW-UP (OUTPATIENT)
Dept: MEDICAL GROUP | Facility: CLINIC | Age: 45
End: 2025-03-10

## 2025-05-03 ENCOUNTER — HOSPITAL ENCOUNTER (OUTPATIENT)
Dept: RADIOLOGY | Facility: MEDICAL CENTER | Age: 45
End: 2025-05-03
Attending: INTERNAL MEDICINE
Payer: COMMERCIAL

## 2025-05-03 DIAGNOSIS — R93.3 ABNORMAL COLONOSCOPY: ICD-10-CM

## 2025-05-03 DIAGNOSIS — Z12.11 SCREEN FOR COLON CANCER: ICD-10-CM

## 2025-05-03 DIAGNOSIS — K63.5 HYPERPLASTIC POLYP OF DESCENDING COLON: ICD-10-CM

## 2025-05-03 PROCEDURE — 74177 CT ABD & PELVIS W/CONTRAST: CPT

## 2025-05-03 PROCEDURE — 700117 HCHG RX CONTRAST REV CODE 255

## 2025-05-03 RX ADMIN — IOHEXOL 100 ML: 350 INJECTION, SOLUTION INTRAVENOUS at 08:49

## 2025-08-12 ENCOUNTER — OFFICE VISIT (OUTPATIENT)
Dept: SLEEP MEDICINE | Facility: MEDICAL CENTER | Age: 45
End: 2025-08-12
Attending: PHYSICIAN ASSISTANT
Payer: COMMERCIAL

## 2025-08-12 VITALS
DIASTOLIC BLOOD PRESSURE: 74 MMHG | HEART RATE: 74 BPM | HEIGHT: 66 IN | BODY MASS INDEX: 27 KG/M2 | WEIGHT: 168 LBS | OXYGEN SATURATION: 94 % | SYSTOLIC BLOOD PRESSURE: 116 MMHG

## 2025-08-12 DIAGNOSIS — G47.10 HYPERSOMNOLENCE DISORDER: Primary | ICD-10-CM

## 2025-08-12 DIAGNOSIS — G47.33 OSA (OBSTRUCTIVE SLEEP APNEA): ICD-10-CM

## 2025-08-12 PROCEDURE — 3074F SYST BP LT 130 MM HG: CPT | Performed by: PHYSICIAN ASSISTANT

## 2025-08-12 PROCEDURE — 99212 OFFICE O/P EST SF 10 MIN: CPT | Performed by: PHYSICIAN ASSISTANT

## 2025-08-12 PROCEDURE — 3078F DIAST BP <80 MM HG: CPT | Performed by: PHYSICIAN ASSISTANT

## 2025-08-12 PROCEDURE — 99213 OFFICE O/P EST LOW 20 MIN: CPT | Performed by: PHYSICIAN ASSISTANT

## 2025-08-12 ASSESSMENT — ENCOUNTER SYMPTOMS
ORTHOPNEA: 0
TREMORS: 0
WHEEZING: 0
SINUS PAIN: 0
PALPITATIONS: 0
SPUTUM PRODUCTION: 0
FEVER: 0
CHILLS: 0
HEADACHES: 0
HEARTBURN: 0
DIZZINESS: 0
SORE THROAT: 0
COUGH: 0
INSOMNIA: 1
SHORTNESS OF BREATH: 0
WEIGHT LOSS: 0

## 2025-08-12 ASSESSMENT — FIBROSIS 4 INDEX: FIB4 SCORE: 0.53

## (undated) DEVICE — CANISTER SUCTION 3000ML MECHANICAL FILTER AUTO SHUTOFF MEDI-VAC NONSTERILE LF DISP (40EA/CA)

## (undated) DEVICE — GLOVE BIOGEL INDICATOR SZ 6.5 SURGICAL PF LTX - (50PR/BX 4BX/CA)

## (undated) DEVICE — RELOAD STAPLER SUREFORM 60 BLUE (12EA/BX)

## (undated) DEVICE — SYRINGE ASEPTO - (50EA/CA

## (undated) DEVICE — SUCTION INSTRUMENT YANKAUER BULBOUS TIP W/O VENT (50EA/CA)

## (undated) DEVICE — CANISTER SUCTION 3000ML MECHANICAL FILTER AUTO SHUTOFF MEDI-VAC NONSTERILE LF DISP  (40EA/CA)

## (undated) DEVICE — SUTURE 3-0 SILK SH 30 (36PK/BX)

## (undated) DEVICE — SET EXTENSION WITH 2 PORTS (48EA/CA) ***PART #2C8610 IS A SUBSTITUTE*****

## (undated) DEVICE — SLEEVE, VASO, THIGH, MED

## (undated) DEVICE — ELECTRODE DUAL RETURN W/ CORD - (50/PK)

## (undated) DEVICE — COVER PROBE STERILE CONE (12EA/CA)

## (undated) DEVICE — SYRINGE 10 ML CONTROL LL (25EA/BX 4BX/CA)

## (undated) DEVICE — SUTURE GENERAL

## (undated) DEVICE — SPATULA PERMANENT CAUTERY DA VINCI 10X'S REUSABLE

## (undated) DEVICE — SET LEADWIRE 5 LEAD BEDSIDE DISPOSABLE ECG (1SET OF 5/EA)

## (undated) DEVICE — SODIUM CHL IRRIGATION 0.9% 1000ML (12EA/CA)

## (undated) DEVICE — SYSTEM PREVENA DRESSING CUSTOMIZABLE (5EA/CA)

## (undated) DEVICE — BLADE SURGICAL #15 - (50/BX 3BX/CA)

## (undated) DEVICE — SUTURE QUILL 0 PDO 14X14 - (12/BX)

## (undated) DEVICE — TRAY SRGPRP PVP IOD WT PRP - (20/CA)

## (undated) DEVICE — GLOVE SZ 6.5 BIOGEL PI MICRO - PF LF (50PR/BX)

## (undated) DEVICE — COVER FOOT UNIVERSAL DISP. - (25EA/CA)

## (undated) DEVICE — DRESSING TRANSPARENT FILM TEGADERM 2.375 X 2.75" (100EA/BX)"

## (undated) DEVICE — SYRINGE 30 ML LL (56/BX)

## (undated) DEVICE — TUBE CONNECT SUCTION CLEAR 120 X 1/4" (50EA/CA)"

## (undated) DEVICE — DRAPE ARM BOX OF 20

## (undated) DEVICE — DRESSING TRANSPARENT FILM TEGADERM 2.375 X 2.75"  (100EA/BX)"

## (undated) DEVICE — KIT ANESTHESIA W/CIRCUIT & 3/LT BAG W/FILTER (20EA/CA)

## (undated) DEVICE — SYRINGE 50ML CATHETER TIP (40EA/BX 4BX/CA)

## (undated) DEVICE — PAD OR TABLE DA VINCI 2IN X 20IN X 72IN - (12EA/CA)

## (undated) DEVICE — COVER LIGHT HANDLE ALC PLUS DISP (18EA/BX)

## (undated) DEVICE — NEEDLE INSUFFLATION FOR STEP - (12/BX)

## (undated) DEVICE — ENDOLOOP 0 VICRYL - (12/BX)

## (undated) DEVICE — FORCEPS PROGRASP (18UN/EA)

## (undated) DEVICE — BANDAGE ELASTIC STERILE MATRIX 6 X 10 (20EA/CA)

## (undated) DEVICE — SHEET TRANSVERSE LAP - (12EA/CA)

## (undated) DEVICE — SUTURE 4-0 VICRYL PLUS FS-2 - 27 INCH (36/BX)

## (undated) DEVICE — SENSOR SPO2 NEO LNCS ADHESIVE (20/BX) SEE USER NOTES

## (undated) DEVICE — RESERVOIR SUCTION 100 CC - SILICONE (20EA/CA)

## (undated) DEVICE — SENSOR OXIMETER ADULT SPO2 RD SET (20EA/BX)

## (undated) DEVICE — LACTATED RINGERS INJ 1000 ML - (14EA/CA 60CA/PF)

## (undated) DEVICE — TOWEL STOP TIMEOUT SAFETY FLAG (40EA/CA)

## (undated) DEVICE — STAPLER SUREFORM 60 12 MM (6EA/BX)

## (undated) DEVICE — SLEEVE VASO DVT COMPRESSION CALF MED - (10PR/CA)

## (undated) DEVICE — NEEDLE NON SAFETY 25 GA X 1 1/2 IN HYPO (100EA/BX)

## (undated) DEVICE — GOWN WARMING STANDARD FLEX - (30/CA)

## (undated) DEVICE — PROTECTOR ULNA NERVE - (36PR/CA)

## (undated) DEVICE — TUBING CLEARLINK DUO-VENT - C-FLO (48EA/CA)

## (undated) DEVICE — SPECIMEN BAG ENDOCATCH II15MM 15MM SPECIMEN RETRIEVAL (3EA/CA)

## (undated) DEVICE — SUTURE 3-0 MONOCRYL PLUS PS-1 - 27 INCH (36/BX)

## (undated) DEVICE — PACK GYN DAVINCI (1EA/CA)

## (undated) DEVICE — BLADE SURGICAL #10 - (50/BX)

## (undated) DEVICE — PACK TRENGUARD 450 PROCEDURE (12EA/CA)

## (undated) DEVICE — LIGASURE SM JAW SEALER CRVD - (6EA/CA)

## (undated) DEVICE — SUTURE 0 VICRYL PLUS CT-1 - 36 INCH (36/BX)

## (undated) DEVICE — TRAY SKIN SCRUB PVP WET (20EA/CA) PART #DYND70356 DISCONTINUED

## (undated) DEVICE — ELECTRODE 850 FOAM ADHESIVE - HYDROGEL RADIOTRNSPRNT (50/PK)

## (undated) DEVICE — CLIP HEMOLOCK PURPLE - (14/BX)

## (undated) DEVICE — GLOVE BIOGEL PI INDICATOR SZ 6.5 SURGICAL PF LF - (50/BX 4BX/CA)

## (undated) DEVICE — KIT ROOM DECONTAMINATION

## (undated) DEVICE — COVER TIP ENDOWRIST HOT SHEAR - (10EA/BX) DA VINCI

## (undated) DEVICE — DRESSING NON-ADHERING 8 X 3 - (50/BX)

## (undated) DEVICE — NEEDLE DRIVER MEGA SUTURECUT DA VINCI 15X'S REUSABLE

## (undated) DEVICE — GOWN SURGEONS X-LARGE - DISP. (30/CA)

## (undated) DEVICE — SUTURE 3-0 VICRYL PLUS SH - 27 INCH (36/BX)

## (undated) DEVICE — GLOVE BIOGEL SZ 6.5 SURGICAL PF LTX (50PR/BX 4BX/CA)

## (undated) DEVICE — CHLORAPREP 26 ML APPLICATOR - ORANGE TINT(25/CA)

## (undated) DEVICE — HEAD HOLDER JUNIOR/ADULT

## (undated) DEVICE — DRAIN BLAKE 10FR 3/4 FLUTED SILICONE CLOSED WOUND SUCTION CHANNEL  - (10/CA)

## (undated) DEVICE — WIRE GUIDE .038 X 150 FLEX - .

## (undated) DEVICE — GOWN SURGEONS LARGE - (32/CA)

## (undated) DEVICE — GLOVE COTTON STERILE (50/CA)

## (undated) DEVICE — SUTURE 2-0 VICRYL PLUS SH - 27 INCH (36/BX)

## (undated) DEVICE — CLIP MED INTNL HRZN TI ESCP - (25/BX)

## (undated) DEVICE — PAD LAP STERILE 18 X 18 - (5/PK 40PK/CA)

## (undated) DEVICE — BIPOLAR FORCE DA VINCI 12X'S REISABLE

## (undated) DEVICE — DRAPE LAPAROTOMY T SHEET - (12EA/CA)

## (undated) DEVICE — CATHETER FOLEY 22 FR 30CC - (12EA/CA)

## (undated) DEVICE — PACK MINOR BASIN - (2EA/CA)

## (undated) DEVICE — PACK MAJOR BASIN - (2EA/CA)

## (undated) DEVICE — DRAPE COLUMN BOX OF 20

## (undated) DEVICE — SET SUCTION/IRRIGATION WITH DISPOSABLE TIP (6/CA )PART #0250-070-520 IS A SUB

## (undated) DEVICE — SPONGE GAUZESTER 4 X 4 4PLY - (128PK/CA)

## (undated) DEVICE — SUTURE 3-0 VICRYL PLUS SH - 8X 18 INCH (12/BX)

## (undated) DEVICE — SUTURE 2-0 ETHILON FS - (36/BX) 18 INCH

## (undated) DEVICE — DRAIN J-VAC 10MM FLAT - (10/CA)

## (undated) DEVICE — BOVIE BLADE COATED - (50/PK)

## (undated) DEVICE — NEPTUNE 4 PORT MANIFOLD - (20/PK)

## (undated) DEVICE — GLOVE BIOGEL PI ORTHO SZ 6 SURGICAL PF LF (40PR/BX)

## (undated) DEVICE — GLOVE BIOGEL PI ORTHO SZ 7.5 PF LF (40PR/BX)

## (undated) DEVICE — MASK ANESTHESIA ADULT  - (100/CA)

## (undated) DEVICE — ARMREST CRADLE FOAM - (2PR/PK 12PR/CA)

## (undated) DEVICE — SEALER SYNCROSEAL ENERGY DAVINCI SINGLE USE (6EA/BX)

## (undated) DEVICE — DRAPE SURGICAL U 77X120 - (10/CA)